# Patient Record
Sex: FEMALE | Race: WHITE | Employment: OTHER | URBAN - METROPOLITAN AREA
[De-identification: names, ages, dates, MRNs, and addresses within clinical notes are randomized per-mention and may not be internally consistent; named-entity substitution may affect disease eponyms.]

---

## 2018-03-28 ENCOUNTER — OFFICE VISIT (OUTPATIENT)
Dept: FAMILY MEDICINE CLINIC | Age: 68
End: 2018-03-28
Payer: MEDICARE

## 2018-03-28 VITALS
BODY MASS INDEX: 44.56 KG/M2 | HEIGHT: 64 IN | TEMPERATURE: 98.2 F | HEART RATE: 73 BPM | WEIGHT: 261 LBS | DIASTOLIC BLOOD PRESSURE: 88 MMHG | OXYGEN SATURATION: 95 % | SYSTOLIC BLOOD PRESSURE: 132 MMHG

## 2018-03-28 DIAGNOSIS — G47.33 OSA ON CPAP: ICD-10-CM

## 2018-03-28 DIAGNOSIS — Z99.89 OSA ON CPAP: ICD-10-CM

## 2018-03-28 DIAGNOSIS — E55.9 VITAMIN D DEFICIENCY: ICD-10-CM

## 2018-03-28 DIAGNOSIS — M25.562 CHRONIC PAIN OF BOTH KNEES: ICD-10-CM

## 2018-03-28 DIAGNOSIS — M25.561 CHRONIC PAIN OF BOTH KNEES: ICD-10-CM

## 2018-03-28 DIAGNOSIS — G89.29 CHRONIC PAIN OF BOTH KNEES: ICD-10-CM

## 2018-03-28 DIAGNOSIS — E03.9 HYPOTHYROIDISM, ADULT: Primary | ICD-10-CM

## 2018-03-28 DIAGNOSIS — Z12.31 SCREENING MAMMOGRAM, ENCOUNTER FOR: ICD-10-CM

## 2018-03-28 PROCEDURE — 99203 OFFICE O/P NEW LOW 30 MIN: CPT | Performed by: FAMILY MEDICINE

## 2018-03-28 RX ORDER — MAG HYDROX/ALUMINUM HYD/SIMETH 400-400-40
SUSPENSION, ORAL (FINAL DOSE FORM) ORAL
COMMUNITY
End: 2021-09-16

## 2018-03-28 RX ORDER — LEVOTHYROXINE SODIUM 0.15 MG/1
150 TABLET ORAL DAILY
COMMUNITY
End: 2018-11-29 | Stop reason: DRUGHIGH

## 2018-03-28 NOTE — PROGRESS NOTES
JESSICA  Uses 6-7 hours and does receive benefit from use of the equipment. Present pressure is 10. Recommended Vit B12 replacement - her vit B12 was 252-low normal on labs drawn by previous PCP    Discussed possibly getting a new sleep study in the future as she is not sure the current pressure for CPAP is as effective. She will call if she wishes referral .     Pt brings most recent labs from her PCP-will have the lab results scanned   Return 11/2018 for fasting labs -will obtain CMP, lipids, TSH and Vit D with next labs   Please note that >30 minutes was spent face-to-face with patient gathering history, performing physical exam, discussing findings, counseling patient, and determining plan forward. All questions addressed and answered. Collins FISHER.

## 2018-03-28 NOTE — LETTER
[address] [place] [address] Drive  [address] [place] [address]  Phone: [phone]  Fax: Arminda Pryor [address] [place] [address], DO        March 28, 2018      To whom it may concern,     Alberto Evangelista has a history of JESSICA with CPAP use. She is using the equipment every night for her JESSICA. She uses the equipment 6-7 hours a night and receives benefit from use.        Sincerely,        Collins Medrano DO

## 2018-04-10 ENCOUNTER — HOSPITAL ENCOUNTER (OUTPATIENT)
Dept: MAMMOGRAPHY | Age: 68
Discharge: HOME | End: 2018-04-12
Payer: MEDICARE

## 2018-04-10 DIAGNOSIS — Z12.31 SCREENING MAMMOGRAM, ENCOUNTER FOR: ICD-10-CM

## 2018-04-10 PROCEDURE — 77067 SCR MAMMO BI INCL CAD: CPT

## 2018-11-19 ENCOUNTER — TELEPHONE (OUTPATIENT)
Dept: FAMILY MEDICINE CLINIC | Age: 68
End: 2018-11-19

## 2018-11-19 DIAGNOSIS — E55.9 VITAMIN D DEFICIENCY: Primary | ICD-10-CM

## 2018-11-19 DIAGNOSIS — Z13.220 SCREENING FOR LIPID DISORDERS: ICD-10-CM

## 2018-11-19 DIAGNOSIS — E03.9 HYPOTHYROIDISM, ADULT: ICD-10-CM

## 2018-11-19 DIAGNOSIS — Z13.1 DIABETES MELLITUS SCREENING: ICD-10-CM

## 2018-11-20 ENCOUNTER — HOSPITAL ENCOUNTER (OUTPATIENT)
Age: 68
Discharge: HOME | End: 2018-11-22
Payer: MEDICARE

## 2018-11-20 ENCOUNTER — NURSE ONLY (OUTPATIENT)
Dept: FAMILY MEDICINE CLINIC | Age: 68
End: 2018-11-20
Payer: MEDICARE

## 2018-11-20 DIAGNOSIS — Z13.220 SCREENING FOR LIPID DISORDERS: ICD-10-CM

## 2018-11-20 DIAGNOSIS — E03.9 HYPOTHYROIDISM, ADULT: ICD-10-CM

## 2018-11-20 DIAGNOSIS — E55.9 VITAMIN D DEFICIENCY: ICD-10-CM

## 2018-11-20 DIAGNOSIS — E55.9 VITAMIN D DEFICIENCY: Primary | ICD-10-CM

## 2018-11-20 LAB
ALBUMIN SERPL-MCNC: 4.5 G/DL (ref 3.5–5.2)
ALP BLD-CCNC: 73 U/L (ref 35–104)
ALT SERPL-CCNC: 22 U/L (ref 0–32)
ANION GAP SERPL CALCULATED.3IONS-SCNC: 14 MMOL/L (ref 7–16)
AST SERPL-CCNC: 21 U/L (ref 0–31)
BILIRUB SERPL-MCNC: 0.5 MG/DL (ref 0–1.2)
BUN BLDV-MCNC: 18 MG/DL (ref 8–23)
CALCIUM SERPL-MCNC: 9.1 MG/DL (ref 8.6–10.2)
CHLORIDE BLD-SCNC: 98 MMOL/L (ref 98–107)
CHOLESTEROL, TOTAL: 224 MG/DL (ref 0–199)
CO2: 24 MMOL/L (ref 22–29)
CREAT SERPL-MCNC: 0.9 MG/DL (ref 0.5–1)
GFR AFRICAN AMERICAN: >60
GFR NON-AFRICAN AMERICAN: >60 ML/MIN/1.73
GLUCOSE BLD-MCNC: 87 MG/DL (ref 74–99)
HDLC SERPL-MCNC: 48 MG/DL
LDL CHOLESTEROL CALCULATED: 138 MG/DL (ref 0–99)
POTASSIUM SERPL-SCNC: 4.6 MMOL/L (ref 3.5–5)
SODIUM BLD-SCNC: 136 MMOL/L (ref 132–146)
TOTAL PROTEIN: 7.8 G/DL (ref 6.4–8.3)
TRIGL SERPL-MCNC: 189 MG/DL (ref 0–149)
TSH SERPL DL<=0.05 MIU/L-ACNC: 9.67 UIU/ML (ref 0.27–4.2)
VITAMIN D 25-HYDROXY: 27 NG/ML (ref 30–100)
VLDLC SERPL CALC-MCNC: 38 MG/DL

## 2018-11-20 PROCEDURE — 36415 COLL VENOUS BLD VENIPUNCTURE: CPT | Performed by: FAMILY MEDICINE

## 2018-11-20 PROCEDURE — 82306 VITAMIN D 25 HYDROXY: CPT

## 2018-11-20 PROCEDURE — 80053 COMPREHEN METABOLIC PANEL: CPT

## 2018-11-20 PROCEDURE — 80061 LIPID PANEL: CPT

## 2018-11-20 PROCEDURE — 84443 ASSAY THYROID STIM HORMONE: CPT

## 2018-11-29 ENCOUNTER — OFFICE VISIT (OUTPATIENT)
Dept: FAMILY MEDICINE CLINIC | Age: 68
End: 2018-11-29
Payer: MEDICARE

## 2018-11-29 VITALS
HEART RATE: 67 BPM | HEIGHT: 64 IN | WEIGHT: 268 LBS | BODY MASS INDEX: 45.75 KG/M2 | DIASTOLIC BLOOD PRESSURE: 78 MMHG | SYSTOLIC BLOOD PRESSURE: 120 MMHG | TEMPERATURE: 98.5 F | OXYGEN SATURATION: 93 %

## 2018-11-29 DIAGNOSIS — Z99.89 OSA ON CPAP: ICD-10-CM

## 2018-11-29 DIAGNOSIS — G47.33 OSA ON CPAP: ICD-10-CM

## 2018-11-29 DIAGNOSIS — E55.9 VITAMIN D DEFICIENCY: ICD-10-CM

## 2018-11-29 DIAGNOSIS — R42 DIZZINESS: ICD-10-CM

## 2018-11-29 DIAGNOSIS — E78.2 MIXED HYPERLIPIDEMIA: ICD-10-CM

## 2018-11-29 DIAGNOSIS — E03.9 HYPOTHYROIDISM, ADULT: Primary | ICD-10-CM

## 2018-11-29 DIAGNOSIS — R06.02 SHORTNESS OF BREATH ON EXERTION: ICD-10-CM

## 2018-11-29 DIAGNOSIS — R03.0 ELEVATED BLOOD-PRESSURE READING WITHOUT DIAGNOSIS OF HYPERTENSION: ICD-10-CM

## 2018-11-29 DIAGNOSIS — Z23 NEED FOR INFLUENZA VACCINATION: ICD-10-CM

## 2018-11-29 PROCEDURE — G0008 ADMIN INFLUENZA VIRUS VAC: HCPCS | Performed by: FAMILY MEDICINE

## 2018-11-29 PROCEDURE — 93000 ELECTROCARDIOGRAM COMPLETE: CPT | Performed by: FAMILY MEDICINE

## 2018-11-29 PROCEDURE — 90662 IIV NO PRSV INCREASED AG IM: CPT | Performed by: FAMILY MEDICINE

## 2018-11-29 PROCEDURE — 99214 OFFICE O/P EST MOD 30 MIN: CPT | Performed by: FAMILY MEDICINE

## 2018-11-29 RX ORDER — LEVOTHYROXINE SODIUM 175 UG/1
175 TABLET ORAL DAILY
Qty: 90 TABLET | Refills: 2 | Status: SHIPPED | OUTPATIENT
Start: 2018-11-29 | End: 2019-01-31 | Stop reason: DRUGHIGH

## 2018-11-29 ASSESSMENT — PATIENT HEALTH QUESTIONNAIRE - PHQ9
SUM OF ALL RESPONSES TO PHQ QUESTIONS 1-9: 0
SUM OF ALL RESPONSES TO PHQ9 QUESTIONS 1 & 2: 0
SUM OF ALL RESPONSES TO PHQ QUESTIONS 1-9: 0
1. LITTLE INTEREST OR PLEASURE IN DOING THINGS: 0
2. FEELING DOWN, DEPRESSED OR HOPELESS: 0

## 2018-12-07 ENCOUNTER — HOSPITAL ENCOUNTER (OUTPATIENT)
Dept: ULTRASOUND IMAGING | Age: 68
Discharge: HOME | End: 2018-12-09
Payer: MEDICARE

## 2018-12-07 DIAGNOSIS — R42 DIZZINESS: ICD-10-CM

## 2018-12-07 PROCEDURE — 93880 EXTRACRANIAL BILAT STUDY: CPT

## 2018-12-09 DIAGNOSIS — E03.9 HYPOTHYROIDISM, ADULT: Primary | ICD-10-CM

## 2018-12-13 ENCOUNTER — HOSPITAL ENCOUNTER (OUTPATIENT)
Dept: CARDIOLOGY | Age: 68
Discharge: HOME | End: 2018-12-13
Payer: MEDICARE

## 2018-12-13 ENCOUNTER — HOSPITAL ENCOUNTER (OUTPATIENT)
Dept: GENERAL RADIOLOGY | Age: 68
Discharge: HOME | End: 2018-12-15
Payer: MEDICARE

## 2018-12-13 ENCOUNTER — HOSPITAL ENCOUNTER (OUTPATIENT)
Age: 68
Discharge: HOME | End: 2018-12-15
Payer: MEDICARE

## 2018-12-13 DIAGNOSIS — R06.02 SHORTNESS OF BREATH ON EXERTION: ICD-10-CM

## 2018-12-13 LAB
LV EF: 65 %
LVEF MODALITY: NORMAL

## 2018-12-13 PROCEDURE — 93306 TTE W/DOPPLER COMPLETE: CPT | Performed by: PSYCHIATRY & NEUROLOGY

## 2018-12-13 PROCEDURE — 71046 X-RAY EXAM CHEST 2 VIEWS: CPT

## 2018-12-14 DIAGNOSIS — R06.09 DYSPNEA ON EXERTION: Primary | ICD-10-CM

## 2018-12-15 ENCOUNTER — HOSPITAL ENCOUNTER (OUTPATIENT)
Age: 68
Discharge: HOME | End: 2018-12-15
Payer: MEDICARE

## 2018-12-15 DIAGNOSIS — R06.02 SHORTNESS OF BREATH ON EXERTION: Primary | ICD-10-CM

## 2018-12-15 DIAGNOSIS — R79.89 ELEVATED BRAIN NATRIURETIC PEPTIDE (BNP) LEVEL: ICD-10-CM

## 2018-12-15 DIAGNOSIS — E78.2 MIXED HYPERLIPIDEMIA: ICD-10-CM

## 2018-12-15 DIAGNOSIS — R06.09 DYSPNEA ON EXERTION: ICD-10-CM

## 2018-12-15 LAB — PRO-BNP: 743 PG/ML (ref 0–125)

## 2018-12-15 PROCEDURE — 83880 ASSAY OF NATRIURETIC PEPTIDE: CPT

## 2018-12-15 PROCEDURE — 36415 COLL VENOUS BLD VENIPUNCTURE: CPT

## 2019-01-02 ENCOUNTER — HOSPITAL ENCOUNTER (OUTPATIENT)
Age: 69
Discharge: HOME | End: 2019-01-04
Payer: MEDICARE

## 2019-01-02 ENCOUNTER — OFFICE VISIT (OUTPATIENT)
Dept: FAMILY MEDICINE CLINIC | Age: 69
End: 2019-01-02
Payer: MEDICARE

## 2019-01-02 VITALS
DIASTOLIC BLOOD PRESSURE: 72 MMHG | OXYGEN SATURATION: 98 % | WEIGHT: 262 LBS | HEART RATE: 81 BPM | BODY MASS INDEX: 44.73 KG/M2 | TEMPERATURE: 98.2 F | HEIGHT: 64 IN | SYSTOLIC BLOOD PRESSURE: 120 MMHG

## 2019-01-02 DIAGNOSIS — E03.9 HYPOTHYROIDISM, ADULT: ICD-10-CM

## 2019-01-02 DIAGNOSIS — Z23 NEED FOR VACCINATION WITH 13-POLYVALENT PNEUMOCOCCAL CONJUGATE VACCINE: ICD-10-CM

## 2019-01-02 DIAGNOSIS — R06.02 SHORTNESS OF BREATH ON EXERTION: Primary | ICD-10-CM

## 2019-01-02 DIAGNOSIS — R06.02 SHORTNESS OF BREATH ON EXERTION: ICD-10-CM

## 2019-01-02 LAB
BASOPHILS ABSOLUTE: 0.04 E9/L (ref 0–0.2)
BASOPHILS RELATIVE PERCENT: 0.6 % (ref 0–2)
EOSINOPHILS ABSOLUTE: 0.25 E9/L (ref 0.05–0.5)
EOSINOPHILS RELATIVE PERCENT: 3.5 % (ref 0–6)
HCT VFR BLD CALC: 45.1 % (ref 34–48)
HEMOGLOBIN: 13.7 G/DL (ref 11.5–15.5)
IMMATURE GRANULOCYTES #: 0.02 E9/L
IMMATURE GRANULOCYTES %: 0.3 % (ref 0–5)
LYMPHOCYTES ABSOLUTE: 1.65 E9/L (ref 1.5–4)
LYMPHOCYTES RELATIVE PERCENT: 23.4 % (ref 20–42)
MCH RBC QN AUTO: 28 PG (ref 26–35)
MCHC RBC AUTO-ENTMCNC: 30.4 % (ref 32–34.5)
MCV RBC AUTO: 92 FL (ref 80–99.9)
MONOCYTES ABSOLUTE: 0.7 E9/L (ref 0.1–0.95)
MONOCYTES RELATIVE PERCENT: 9.9 % (ref 2–12)
NEUTROPHILS ABSOLUTE: 4.39 E9/L (ref 1.8–7.3)
NEUTROPHILS RELATIVE PERCENT: 62.3 % (ref 43–80)
PDW BLD-RTO: 13.5 FL (ref 11.5–15)
PLATELET # BLD: 241 E9/L (ref 130–450)
PMV BLD AUTO: 11.9 FL (ref 7–12)
RBC # BLD: 4.9 E12/L (ref 3.5–5.5)
WBC # BLD: 7.1 E9/L (ref 4.5–11.5)

## 2019-01-02 PROCEDURE — 36415 COLL VENOUS BLD VENIPUNCTURE: CPT | Performed by: FAMILY MEDICINE

## 2019-01-02 PROCEDURE — G0009 ADMIN PNEUMOCOCCAL VACCINE: HCPCS | Performed by: FAMILY MEDICINE

## 2019-01-02 PROCEDURE — 99213 OFFICE O/P EST LOW 20 MIN: CPT | Performed by: FAMILY MEDICINE

## 2019-01-02 PROCEDURE — 90670 PCV13 VACCINE IM: CPT | Performed by: FAMILY MEDICINE

## 2019-01-02 PROCEDURE — 85025 COMPLETE CBC W/AUTO DIFF WBC: CPT

## 2019-01-03 ENCOUNTER — HOSPITAL ENCOUNTER (OUTPATIENT)
Dept: CARDIOLOGY | Age: 69
Discharge: HOME | End: 2019-01-03
Payer: MEDICARE

## 2019-01-03 VITALS
HEART RATE: 98 BPM | OXYGEN SATURATION: 97 % | HEIGHT: 64 IN | BODY MASS INDEX: 44.73 KG/M2 | WEIGHT: 262 LBS | DIASTOLIC BLOOD PRESSURE: 72 MMHG | SYSTOLIC BLOOD PRESSURE: 128 MMHG

## 2019-01-03 DIAGNOSIS — E78.2 MIXED HYPERLIPIDEMIA: ICD-10-CM

## 2019-01-03 DIAGNOSIS — R79.89 ELEVATED BRAIN NATRIURETIC PEPTIDE (BNP) LEVEL: ICD-10-CM

## 2019-01-03 DIAGNOSIS — R06.02 SHORTNESS OF BREATH ON EXERTION: Primary | ICD-10-CM

## 2019-01-03 LAB
LV EF: 75 %
LVEF MODALITY: NORMAL

## 2019-01-03 PROCEDURE — 2580000003 HC RX 258: Performed by: INTERNAL MEDICINE

## 2019-01-03 PROCEDURE — 3430000000 HC RX DIAGNOSTIC RADIOPHARMACEUTICAL: Performed by: INTERNAL MEDICINE

## 2019-01-03 PROCEDURE — 78452 HT MUSCLE IMAGE SPECT MULT: CPT

## 2019-01-03 PROCEDURE — A9500 TC99M SESTAMIBI: HCPCS | Performed by: INTERNAL MEDICINE

## 2019-01-03 PROCEDURE — 93017 CV STRESS TEST TRACING ONLY: CPT

## 2019-01-03 RX ORDER — SODIUM CHLORIDE 0.9 % (FLUSH) 0.9 %
10 SYRINGE (ML) INJECTION PRN
Status: DISCONTINUED | OUTPATIENT
Start: 2019-01-03 | End: 2019-01-04 | Stop reason: HOSPADM

## 2019-01-03 RX ADMIN — Medication 10 ML: at 10:52

## 2019-01-03 RX ADMIN — Medication 10 ML: at 08:56

## 2019-01-03 RX ADMIN — Medication 31.9 MILLICURIE: at 10:52

## 2019-01-03 RX ADMIN — Medication 10.5 MILLICURIE: at 08:56

## 2019-01-04 DIAGNOSIS — R06.09 DYSPNEA ON EXERTION: Primary | ICD-10-CM

## 2019-01-07 ENCOUNTER — HOSPITAL ENCOUNTER (OUTPATIENT)
Dept: PULMONOLOGY | Age: 69
Discharge: HOME | End: 2019-01-07
Payer: MEDICARE

## 2019-01-07 DIAGNOSIS — R06.09 DYSPNEA ON EXERTION: ICD-10-CM

## 2019-01-07 PROCEDURE — 94726 PLETHYSMOGRAPHY LUNG VOLUMES: CPT

## 2019-01-07 PROCEDURE — 94060 EVALUATION OF WHEEZING: CPT

## 2019-01-07 PROCEDURE — 94729 DIFFUSING CAPACITY: CPT

## 2019-01-08 ENCOUNTER — TELEPHONE (OUTPATIENT)
Dept: ADMINISTRATIVE | Age: 69
End: 2019-01-08

## 2019-01-09 ENCOUNTER — TELEPHONE (OUTPATIENT)
Dept: ADMINISTRATIVE | Age: 69
End: 2019-01-09

## 2019-01-26 PROBLEM — R06.02 SOB (SHORTNESS OF BREATH): Status: ACTIVE | Noted: 2019-01-26

## 2019-01-29 ENCOUNTER — OFFICE VISIT (OUTPATIENT)
Dept: CARDIOLOGY CLINIC | Age: 69
End: 2019-01-29
Payer: MEDICARE

## 2019-01-29 VITALS
HEART RATE: 128 BPM | RESPIRATION RATE: 16 BRPM | WEIGHT: 257 LBS | DIASTOLIC BLOOD PRESSURE: 90 MMHG | HEIGHT: 64 IN | SYSTOLIC BLOOD PRESSURE: 138 MMHG | BODY MASS INDEX: 43.87 KG/M2

## 2019-01-29 DIAGNOSIS — R06.02 SOB (SHORTNESS OF BREATH): ICD-10-CM

## 2019-01-29 DIAGNOSIS — I48.19 PERSISTENT ATRIAL FIBRILLATION (HCC): ICD-10-CM

## 2019-01-29 PROCEDURE — 93000 ELECTROCARDIOGRAM COMPLETE: CPT | Performed by: INTERNAL MEDICINE

## 2019-01-29 PROCEDURE — 99205 OFFICE O/P NEW HI 60 MIN: CPT | Performed by: INTERNAL MEDICINE

## 2019-01-29 RX ORDER — METOPROLOL SUCCINATE 50 MG/1
50 TABLET, EXTENDED RELEASE ORAL DAILY
Qty: 90 TABLET | Refills: 3 | Status: SHIPPED | OUTPATIENT
Start: 2019-01-29 | End: 2019-01-30 | Stop reason: SDUPTHER

## 2019-01-30 ENCOUNTER — HOSPITAL ENCOUNTER (OUTPATIENT)
Age: 69
Discharge: HOME | End: 2019-02-01
Payer: MEDICARE

## 2019-01-30 ENCOUNTER — NURSE ONLY (OUTPATIENT)
Dept: FAMILY MEDICINE CLINIC | Age: 69
End: 2019-01-30
Payer: MEDICARE

## 2019-01-30 DIAGNOSIS — E03.9 HYPOTHYROIDISM, ADULT: Primary | ICD-10-CM

## 2019-01-30 DIAGNOSIS — E03.9 HYPOTHYROIDISM, ADULT: ICD-10-CM

## 2019-01-30 LAB
T4 FREE: 2.21 NG/DL (ref 0.93–1.7)
TSH SERPL DL<=0.05 MIU/L-ACNC: <0.005 UIU/ML (ref 0.27–4.2)

## 2019-01-30 PROCEDURE — 84443 ASSAY THYROID STIM HORMONE: CPT

## 2019-01-30 PROCEDURE — 36415 COLL VENOUS BLD VENIPUNCTURE: CPT | Performed by: FAMILY MEDICINE

## 2019-01-30 PROCEDURE — 84439 ASSAY OF FREE THYROXINE: CPT

## 2019-01-30 RX ORDER — METOPROLOL SUCCINATE 50 MG/1
50 TABLET, EXTENDED RELEASE ORAL DAILY
Qty: 30 TABLET | Refills: 0 | Status: SHIPPED | OUTPATIENT
Start: 2019-01-30 | End: 2019-12-18 | Stop reason: SDUPTHER

## 2019-01-31 ENCOUNTER — TELEPHONE (OUTPATIENT)
Dept: FAMILY MEDICINE CLINIC | Age: 69
End: 2019-01-31

## 2019-01-31 DIAGNOSIS — E03.9 HYPOTHYROIDISM, ADULT: Primary | ICD-10-CM

## 2019-01-31 RX ORDER — LEVOTHYROXINE SODIUM 0.15 MG/1
150 TABLET ORAL DAILY
Qty: 90 TABLET | Refills: 0 | Status: SHIPPED | OUTPATIENT
Start: 2019-01-31 | End: 2019-03-26

## 2019-02-13 ENCOUNTER — OFFICE VISIT (OUTPATIENT)
Dept: FAMILY MEDICINE CLINIC | Age: 69
End: 2019-02-13
Payer: MEDICARE

## 2019-02-13 ENCOUNTER — OFFICE VISIT (OUTPATIENT)
Dept: CARDIOLOGY CLINIC | Age: 69
End: 2019-02-13
Payer: MEDICARE

## 2019-02-13 VITALS
RESPIRATION RATE: 16 BRPM | DIASTOLIC BLOOD PRESSURE: 70 MMHG | HEART RATE: 63 BPM | BODY MASS INDEX: 44.71 KG/M2 | WEIGHT: 261.9 LBS | SYSTOLIC BLOOD PRESSURE: 128 MMHG | HEIGHT: 64 IN

## 2019-02-13 VITALS
OXYGEN SATURATION: 98 % | DIASTOLIC BLOOD PRESSURE: 72 MMHG | WEIGHT: 261 LBS | HEART RATE: 65 BPM | RESPIRATION RATE: 18 BRPM | HEIGHT: 64 IN | SYSTOLIC BLOOD PRESSURE: 116 MMHG | BODY MASS INDEX: 44.56 KG/M2

## 2019-02-13 DIAGNOSIS — E78.2 MIXED HYPERLIPIDEMIA: ICD-10-CM

## 2019-02-13 DIAGNOSIS — Z76.89 ENCOUNTER TO ESTABLISH CARE WITH NEW DOCTOR: ICD-10-CM

## 2019-02-13 DIAGNOSIS — I48.19 PERSISTENT ATRIAL FIBRILLATION (HCC): Primary | ICD-10-CM

## 2019-02-13 DIAGNOSIS — I48.0 PAROXYSMAL ATRIAL FIBRILLATION (HCC): ICD-10-CM

## 2019-02-13 DIAGNOSIS — E03.9 HYPOTHYROIDISM, ADULT: Primary | ICD-10-CM

## 2019-02-13 PROCEDURE — 3288F FALL RISK ASSESSMENT DOCD: CPT | Performed by: FAMILY MEDICINE

## 2019-02-13 PROCEDURE — G8510 SCR DEP NEG, NO PLAN REQD: HCPCS | Performed by: FAMILY MEDICINE

## 2019-02-13 PROCEDURE — 93000 ELECTROCARDIOGRAM COMPLETE: CPT | Performed by: INTERNAL MEDICINE

## 2019-02-13 PROCEDURE — 99213 OFFICE O/P EST LOW 20 MIN: CPT | Performed by: INTERNAL MEDICINE

## 2019-02-13 PROCEDURE — 99213 OFFICE O/P EST LOW 20 MIN: CPT | Performed by: FAMILY MEDICINE

## 2019-02-13 ASSESSMENT — ENCOUNTER SYMPTOMS
VOMITING: 0
NAUSEA: 0
BLOOD IN STOOL: 0
COUGH: 0
BACK PAIN: 0
ABDOMINAL PAIN: 0
TROUBLE SWALLOWING: 0
SORE THROAT: 0
RHINORRHEA: 0
PHOTOPHOBIA: 0
COLOR CHANGE: 0
SINUS PRESSURE: 0
WHEEZING: 0
EYE PAIN: 0
CONSTIPATION: 0
DIARRHEA: 0
SHORTNESS OF BREATH: 0
SINUS PAIN: 0

## 2019-02-13 ASSESSMENT — PATIENT HEALTH QUESTIONNAIRE - PHQ9
1. LITTLE INTEREST OR PLEASURE IN DOING THINGS: 0
SUM OF ALL RESPONSES TO PHQ9 QUESTIONS 1 & 2: 0
2. FEELING DOWN, DEPRESSED OR HOPELESS: 0
SUM OF ALL RESPONSES TO PHQ QUESTIONS 1-9: 0
SUM OF ALL RESPONSES TO PHQ QUESTIONS 1-9: 0

## 2019-03-22 ENCOUNTER — HOSPITAL ENCOUNTER (OUTPATIENT)
Age: 69
Discharge: HOME | End: 2019-03-22
Payer: MEDICARE

## 2019-03-22 DIAGNOSIS — E03.9 HYPOTHYROIDISM, ADULT: ICD-10-CM

## 2019-03-22 LAB — TSH SERPL DL<=0.05 MIU/L-ACNC: 0.01 UIU/ML (ref 0.27–4.2)

## 2019-03-22 PROCEDURE — 84443 ASSAY THYROID STIM HORMONE: CPT

## 2019-03-22 PROCEDURE — 36415 COLL VENOUS BLD VENIPUNCTURE: CPT

## 2019-03-26 ENCOUNTER — OFFICE VISIT (OUTPATIENT)
Dept: FAMILY MEDICINE CLINIC | Age: 69
End: 2019-03-26
Payer: MEDICARE

## 2019-03-26 VITALS
HEART RATE: 67 BPM | SYSTOLIC BLOOD PRESSURE: 133 MMHG | RESPIRATION RATE: 18 BRPM | WEIGHT: 264 LBS | OXYGEN SATURATION: 96 % | BODY MASS INDEX: 45.07 KG/M2 | HEIGHT: 64 IN | DIASTOLIC BLOOD PRESSURE: 70 MMHG | TEMPERATURE: 98.9 F

## 2019-03-26 DIAGNOSIS — G47.33 OSA (OBSTRUCTIVE SLEEP APNEA): ICD-10-CM

## 2019-03-26 DIAGNOSIS — E03.9 ACQUIRED HYPOTHYROIDISM: Primary | ICD-10-CM

## 2019-03-26 DIAGNOSIS — Z23 NEED FOR TETANUS BOOSTER: ICD-10-CM

## 2019-03-26 PROCEDURE — 90471 IMMUNIZATION ADMIN: CPT | Performed by: FAMILY MEDICINE

## 2019-03-26 PROCEDURE — 99213 OFFICE O/P EST LOW 20 MIN: CPT | Performed by: FAMILY MEDICINE

## 2019-03-26 PROCEDURE — 90715 TDAP VACCINE 7 YRS/> IM: CPT | Performed by: FAMILY MEDICINE

## 2019-03-26 RX ORDER — LEVOTHYROXINE SODIUM 0.1 MG/1
100 TABLET ORAL DAILY
Qty: 30 TABLET | Refills: 3 | Status: SHIPPED | OUTPATIENT
Start: 2019-03-26 | End: 2019-07-31 | Stop reason: SDUPTHER

## 2019-03-26 ASSESSMENT — ENCOUNTER SYMPTOMS
VOMITING: 0
WHEEZING: 0
NAUSEA: 0
CONSTIPATION: 0
SHORTNESS OF BREATH: 0
DIARRHEA: 0
BLOOD IN STOOL: 0
COUGH: 0
ABDOMINAL PAIN: 0

## 2019-04-30 ENCOUNTER — HOSPITAL ENCOUNTER (OUTPATIENT)
Dept: SLEEP CENTER | Age: 69
Discharge: HOME | End: 2019-04-30
Payer: MEDICARE

## 2019-04-30 DIAGNOSIS — G47.33 OSA (OBSTRUCTIVE SLEEP APNEA): ICD-10-CM

## 2019-04-30 PROCEDURE — 95811 POLYSOM 6/>YRS CPAP 4/> PARM: CPT

## 2019-05-01 VITALS
OXYGEN SATURATION: 94 % | DIASTOLIC BLOOD PRESSURE: 86 MMHG | WEIGHT: 255 LBS | HEART RATE: 74 BPM | BODY MASS INDEX: 43.54 KG/M2 | HEIGHT: 64 IN | SYSTOLIC BLOOD PRESSURE: 132 MMHG

## 2019-05-03 ENCOUNTER — TELEPHONE (OUTPATIENT)
Dept: FAMILY MEDICINE CLINIC | Age: 69
End: 2019-05-03

## 2019-05-03 NOTE — TELEPHONE ENCOUNTER
Please call Ignacio Díaz regarding the results of her sleep study. The study showed that patient does not have sleep apnea, as her apnea/hypopnea index was less than 5 (normal). However, the sleep study did reveal nocturnal hypoxia, which may require further evaluation. We can discuss this more in depth at patient's next appointment with me on 5/21/19.

## 2019-05-03 NOTE — PROGRESS NOTES
[address] [address]                               SLEEP STUDY REPORT    PATIENT NAME: Trina Munoz                          :        1950  MED REC NO:   [id number]                            ROOM:  ACCOUNT NO:   [de-identified]                           ADMIT DATE: 2019  PROVIDER:     [place] Jeff MD    DATE OF STUDY:  2019    REFERRING PROVIDER:  Phyllis Kumar DO    STUDY PERFORMED:  Polysomnography. INDICATION FOR POLYSOMNOGRAPHY:  Excessive napping, driving drowsy, and  waking with heart pounding. CURRENT MEDICATIONS:  Eliquis, metoprolol, and Synthroid. INTERPRETATION:  SLEEP ARCHITECTURE:  This patient had a total time in bed of 473  minutes. Total sleep time was 375 minutes. Sleep efficiency was 79%. Sleep latency was 13 minutes. REM latency was 149 minutes. SLEEP STAGING:  The patient was awake 21% of the time in bed. Stage N1  was 8% and N2 was 80% of the total sleep time. Slow wave sleep was 1%  of the sleep time and stage REM sleep was 11% of the sleep time. RESPIRATION SUMMARY:  APNEA:  There were four apneic events including two central and two  obstructive. All occurred during non-REM stage sleep. Maximum duration  was 33 seconds. HYPOPNEA:  There were 24 hypopneic events, three occurred during REM  stage sleep. Maximum duration was 56 seconds. APNEA/HYPOPNEA INDEX:  The apnea/hypopnea index is 4.5. Five of the 28  events occurred in the supine position. AROUSAL ANALYSIS:  There were 74 arousals/awakenings, the sleep  disruption index was 12 (normal less than 10). LIMB MOVEMENT SUMMARY:  There were 108 limb movements, the limb movement  index was 17 (normal less than 15). OXYGEN SATURATION:  Average oxygen saturation while awake was 93%. Lowest saturation was 77%. The patient spent 79% of the time with  saturation at or greater than 90%.   Twenty one percent of the time,  saturation ranged from 80% to 89% and less than 1% of the time,  saturation was less than 80%. HEART RATE SUMMARY:  Average heart rate while awake was 65 beats per  minute. Maximum heart rate during the sleep was 75 beats per minute and  minimum heart rate during the sleep was 49 beats per minute. There were  rare PACs and PVCs, not associated with respiratory events. MISCELLANEOUS:  Artesian Sleepiness Scale score is 13/24. There was no  snoring or bruxism noted. IMPRESSION:  1. No evidence of sleep apnea. 2.  Nocturnal hypoxia. 3.  No snoring. 4.  Rare PVCs/PACs. DISCUSSION:  This patient has an apnea/hypopnea index of 4.5. Normal is  less than five, leaving this patient in the normal category. Along with  this, there was no snoring and only rare PACs/PVCs. Therefore, based on  the results of this study, treatment for sleep apnea is not indicated. However,   One does note nocturnal hypoxia. The etiology of this is not clear from the  study and this may require further investigation. SUGGESTIONS:  1.  Dr. Tucker Cap to discuss the results of study with the  patient. 2.  No reason to treat for sleep apnea. 3.  Consider further evaluation of nocturnal hypoxia.         Jessie Ortiz MD  Diplomat of Sleep Medicine    D: 05/02/2019 15:28:39       T: 05/02/2019 15:36:36     [id number]  Job#: [id number]     Doc#: [id number]    CC:

## 2019-05-17 ENCOUNTER — HOSPITAL ENCOUNTER (OUTPATIENT)
Age: 69
Discharge: HOME | End: 2019-05-17
Payer: MEDICARE

## 2019-05-17 DIAGNOSIS — E03.9 ACQUIRED HYPOTHYROIDISM: ICD-10-CM

## 2019-05-17 LAB
T4 FREE: 1.22 NG/DL (ref 0.93–1.7)
TSH SERPL DL<=0.05 MIU/L-ACNC: 1.29 UIU/ML (ref 0.27–4.2)

## 2019-05-17 PROCEDURE — 84439 ASSAY OF FREE THYROXINE: CPT

## 2019-05-17 PROCEDURE — 84443 ASSAY THYROID STIM HORMONE: CPT

## 2019-05-17 PROCEDURE — 36415 COLL VENOUS BLD VENIPUNCTURE: CPT

## 2019-05-21 ENCOUNTER — OFFICE VISIT (OUTPATIENT)
Dept: FAMILY MEDICINE CLINIC | Age: 69
End: 2019-05-21
Payer: MEDICARE

## 2019-05-21 VITALS
BODY MASS INDEX: 45.89 KG/M2 | TEMPERATURE: 98.1 F | DIASTOLIC BLOOD PRESSURE: 65 MMHG | SYSTOLIC BLOOD PRESSURE: 96 MMHG | WEIGHT: 268.8 LBS | HEIGHT: 64 IN | OXYGEN SATURATION: 98 % | RESPIRATION RATE: 16 BRPM | HEART RATE: 64 BPM

## 2019-05-21 DIAGNOSIS — Z12.31 BREAST CANCER SCREENING BY MAMMOGRAM: ICD-10-CM

## 2019-05-21 DIAGNOSIS — G47.33 OSA (OBSTRUCTIVE SLEEP APNEA): ICD-10-CM

## 2019-05-21 DIAGNOSIS — E55.9 VITAMIN D DEFICIENCY: ICD-10-CM

## 2019-05-21 DIAGNOSIS — E78.5 HYPERLIPIDEMIA, UNSPECIFIED HYPERLIPIDEMIA TYPE: ICD-10-CM

## 2019-05-21 DIAGNOSIS — Z78.0 ASYMPTOMATIC POSTMENOPAUSAL STATE: ICD-10-CM

## 2019-05-21 DIAGNOSIS — Z23 NEED FOR SHINGLES VACCINE: ICD-10-CM

## 2019-05-21 DIAGNOSIS — E03.9 HYPOTHYROIDISM, UNSPECIFIED TYPE: Primary | ICD-10-CM

## 2019-05-21 PROCEDURE — 99213 OFFICE O/P EST LOW 20 MIN: CPT | Performed by: FAMILY MEDICINE

## 2019-05-21 ASSESSMENT — ENCOUNTER SYMPTOMS
CONSTIPATION: 0
NAUSEA: 0
WHEEZING: 0
VOMITING: 0
SHORTNESS OF BREATH: 1
SINUS PAIN: 0
RHINORRHEA: 0
COUGH: 0
ABDOMINAL PAIN: 0
EYE PAIN: 0
BLOOD IN STOOL: 0
DIARRHEA: 0
SINUS PRESSURE: 0

## 2019-05-21 NOTE — PROGRESS NOTES
[facility] [address]  Precepting Note    Subjective:  Hypothyroidism  Labs WNL  tolerating meds  Feels OK- denies SSx  Still sees cardio for Afib     ROS otherwise negative     Past medical, surgical, family and social history were reviewed, non-contributory, and unchanged unless otherwise stated. Objective:    BP 96/65 (Site: Left Upper Arm, Position: Sitting, Cuff Size: Large Adult)   Pulse 64   Temp 98.1 °F (36.7 °C) (Oral)   Resp 16   Ht 5' 4\" (1.626 m)   Wt 268 lb 12.8 oz (121.9 kg)   LMP  (LMP Unknown)   SpO2 98%   Breastfeeding? No   BMI 46.14 kg/m²     Exam is as noted by resident with the following changes, additions or corrections:    General:  NAD; alert & oriented x 3   Neck No thyromegaly   Heart:  RRR  Lungs:  CTA bilaterally, no wheeze, rales or rhonchi    Assessment/Plan:  Hypothyroid   Stable  afib   Per cardio      Attending Physician Statement  I have reviewed the chart, including any radiology or labs. I have discussed the case, including pertinent history and exam findings with the resident. I agree with the assessment, plan and orders as documented by the resident. Please refer to the resident note for additional information.       Electronically signed by Monster Ram MD on 5/21/2019 at 3:34 PM

## 2019-05-21 NOTE — PROGRESS NOTES
[facility]  Family Medicine Residency Program  Phone: [phone]  Fax: [phone]    Patient:  Gertrudis Montes De Oca 79 y.o. female                                 Date of Service: 19                            Chief Complaint:   Chief Complaint   Patient presents with    Results    Hypothyroidism     2 month f/u       History of Present Illness: The patient is a 79 y.o. female who presents to the clinic today for follow-up for hypothyroidism. 1. Hypothyroidism   - Recent labs from 19 showed TSH and free T4 WNL  - On levothyroxine 100 mcg daily. Not missing doses. No side effects.  - Denies weight gain, dry skin, dry hair, and constipation. Review of Systems:   Review of Systems   Constitutional: Negative for activity change, chills, fever and unexpected weight change. HENT: Positive for sneezing. Negative for ear pain, hearing loss, nosebleeds, rhinorrhea, sinus pressure, sinus pain and tinnitus. Eyes: Negative for pain. Denies double vision. Denies blurry vision. Respiratory: Positive for shortness of breath (hx of atrial fibrillation). Negative for cough and wheezing. Cardiovascular: Positive for palpitations (hx of atrial fibrillation). Negative for chest pain and leg swelling. Gastrointestinal: Negative for abdominal pain, blood in stool, constipation, diarrhea, nausea and vomiting. Genitourinary: Positive for urgency. Negative for dysuria, frequency, hematuria and vaginal bleeding. Neurological: Negative for dizziness, tremors, syncope, light-headedness and headaches. Psychiatric/Behavioral: Negative for dysphoric mood. The patient is not nervous/anxious.         Past Medical History:      Diagnosis Date    Arthritis     Atrial fibrillation ([address] 2019    Dr. [facility]    Hypothyroidism     Sleep apnea     On CPAP, wears nightly        Past Surgical History:        Procedure Laterality Date     SECTION      x2    COLONOSCOPY      HYSTERECTOMY, TOTAL ABDOMINAL      WISDOM TOOTH EXTRACTION         Allergies:    Patient has no known allergies.     Social History:   Social History     Socioeconomic History    Marital status:      Spouse name: Not on file    Number of children: Not on file    Years of education: Not on file    Highest education level: Not on file   Occupational History    Not on file   Social Needs    Financial resource strain: Not on file    Food insecurity:     Worry: Not on file     Inability: Not on file    Transportation needs:     Medical: Not on file     Non-medical: Not on file   Tobacco Use    Smoking status: Never Smoker    Smokeless tobacco: Never Used   Substance and Sexual Activity    Alcohol use: No    Drug use: No    Sexual activity: Never   Lifestyle    Physical activity:     Days per week: Not on file     Minutes per session: Not on file    Stress: Not on file   Relationships    Social connections:     Talks on phone: Not on file     Gets together: Not on file     Attends [employer] service: Not on file     Active member of club or organization: Not on file     Attends meetings of clubs or organizations: Not on file     Relationship status: Not on file    Intimate partner violence:     Fear of current or ex partner: Not on file     Emotionally abused: Not on file     Physically abused: Not on file     Forced sexual activity: Not on file   Other Topics Concern    Not on file   Social History Narrative    Not on file        Family History:       Problem Relation Age of Onset    Stroke Mother 35   Clint Ishihara Rheum Arthritis Mother     COPD Father     Colon Polyps Father     Mult Sclerosis Father 48    Hypothyroidism Brother     Rheum Arthritis Brother     Psoriasis Brother     Arthritis Brother     COPD Brother     Other Brother         Venous stasis    High Blood Pressure Brother     Arthritis Brother     Glaucoma Brother     Hypothyroidism Maternal Grandmother     Deep Vein Thrombosis Maternal Grandmother     Heart Disease Maternal Grandfather     Atrial Fibrillation Paternal Grandmother     Hypothyroidism Paternal Grandmother     Cancer Paternal Grandfather        BP Readings from Last 3 Encounters:   05/21/19 96/65   05/01/19 132/86   03/26/19 133/70       Physical Exam:    Vitals: BP 96/65 (Site: Left Upper Arm, Position: Sitting, Cuff Size: Large Adult)   Pulse 64   Temp 98.1 °F (36.7 °C) (Oral)   Resp 16   Ht 5' 4\" (1.626 m)   Wt 268 lb 12.8 oz (121.9 kg)   LMP  (LMP Unknown)   SpO2 98%   Breastfeeding? No   BMI 46.14 kg/m²     General Appearance: Well-developed. Awake and alert. In no acute distress. Head: Normocephalic and atraumatic. Ears: EACs clear. TMs without erythema or bulging. Nose: Nasal mucosa moist and pink. No polyps visualized. Throat: Oral mucosa moist and pink. Uvula midline. Pharynx without erythema or exudate. Neck: Supple and symmetrical. Thyroid without enlargement or palpable nodules. Lungs: Clear to auscultation bilaterally. Respirations unlabored. No rhonchi/wheezing/rales. Heart: RRR. Normal S1 and S2. + murmur. Abdomen: Bowel sounds normoactive. Soft. Nontender. No masses. Psychiatric: Normal mood and affect. Behavior is normal. Thought content appropriate. Speech is normal.       Assessment and Plan:         1. Hypothyroidism, unspecified type  Stable. Continue levothyroxine 100 mcg daily.   - TSH; Future    2. JESSICA (obstructive sleep apnea)  Recent sleep study, read by Dr. Ildefonso Vega, showed no evidence of JESSICA and showed nocturnal hypoxia. Patient has hx of JESSICA and wears CPAP. Will refer to Pulmonology for further evaluation.   - [facility] ([facility]) - Cropp, Real Moulds, DO, Pulmonary, [place]    3. Breast cancer screening by mammogram  - OPAL DIGITAL SCREEN W CAD BILATERAL; Future    4. Asymptomatic postmenopausal state  - DEXA Bone Density Axial Skeleton; Future    5.  Need for shingles vaccine  Prescription for shingles vaccine given at this visit. Return to Office: Return in about 6 months (around 11/21/2019) for follow-up for hypothyroidism . Medication List:    Current Outpatient Medications   Medication Sig Dispense Refill    levothyroxine (SYNTHROID) 100 MCG tablet Take 1 tablet by mouth Daily 30 tablet 3    metoprolol succinate (TOPROL XL) 50 MG extended release tablet Take 1 tablet by mouth daily 30 tablet 0    polyethyl glycol-propyl glycol 0.4-0.3 % (SYSTANE) 0.4-0.3 % ophthalmic solution 2 drops as needed for Dry Eyes      apixaban (ELIQUIS) 5 MG TABS tablet Take 1 tablet by mouth 2 times daily 180 tablet 3    Multiple Vitamins-Minerals (CENTRUM SILVER ULTRA WOMENS PO) Take by mouth      Calcium-Magnesium-Vitamin D (CALCIUM 1200+D3 PO) Take by mouth      Cholecalciferol (VITAMIN D3) 5000 units TABS Take by mouth daily       Omega-3 Fatty Acids (FISH OIL TRIPLE STRENGTH) 1400 MG CAPS Take by mouth       No current facility-administered medications for this visit.          aNnette Cedeno DO   5/21/19  3:40 PM

## 2019-06-26 ENCOUNTER — HOSPITAL ENCOUNTER (OUTPATIENT)
Dept: MAMMOGRAPHY | Age: 69
Discharge: HOME | End: 2019-06-28
Payer: MEDICARE

## 2019-06-26 DIAGNOSIS — Z78.0 ASYMPTOMATIC POSTMENOPAUSAL STATE: ICD-10-CM

## 2019-06-26 DIAGNOSIS — Z12.31 BREAST CANCER SCREENING BY MAMMOGRAM: ICD-10-CM

## 2019-06-26 PROCEDURE — 77067 SCR MAMMO BI INCL CAD: CPT

## 2019-06-26 PROCEDURE — 77080 DXA BONE DENSITY AXIAL: CPT

## 2019-07-31 ENCOUNTER — HOSPITAL ENCOUNTER (OUTPATIENT)
Age: 69
Discharge: HOME | End: 2019-07-31
Payer: MEDICARE

## 2019-07-31 DIAGNOSIS — E03.9 HYPOTHYROIDISM, UNSPECIFIED TYPE: ICD-10-CM

## 2019-07-31 DIAGNOSIS — E78.2 MIXED HYPERLIPIDEMIA: Primary | ICD-10-CM

## 2019-07-31 DIAGNOSIS — E55.9 VITAMIN D DEFICIENCY: ICD-10-CM

## 2019-07-31 DIAGNOSIS — E78.5 HYPERLIPIDEMIA, UNSPECIFIED HYPERLIPIDEMIA TYPE: ICD-10-CM

## 2019-07-31 DIAGNOSIS — E03.9 ACQUIRED HYPOTHYROIDISM: ICD-10-CM

## 2019-07-31 LAB
ALBUMIN SERPL-MCNC: 4.3 G/DL (ref 3.5–5.2)
ALP BLD-CCNC: 73 U/L (ref 35–104)
ALT SERPL-CCNC: 21 U/L (ref 0–32)
ANION GAP SERPL CALCULATED.3IONS-SCNC: 13 MMOL/L (ref 7–16)
AST SERPL-CCNC: 23 U/L (ref 0–31)
BASOPHILS ABSOLUTE: 0.03 E9/L (ref 0–0.2)
BASOPHILS RELATIVE PERCENT: 0.4 % (ref 0–2)
BILIRUB SERPL-MCNC: 0.4 MG/DL (ref 0–1.2)
BUN BLDV-MCNC: 18 MG/DL (ref 8–23)
CALCIUM SERPL-MCNC: 9.2 MG/DL (ref 8.6–10.2)
CHLORIDE BLD-SCNC: 103 MMOL/L (ref 98–107)
CHOLESTEROL, TOTAL: 214 MG/DL (ref 0–199)
CO2: 25 MMOL/L (ref 22–29)
CREAT SERPL-MCNC: 0.9 MG/DL (ref 0.5–1)
EOSINOPHILS ABSOLUTE: 0.3 E9/L (ref 0.05–0.5)
EOSINOPHILS RELATIVE PERCENT: 3.7 % (ref 0–6)
GFR AFRICAN AMERICAN: >60
GFR NON-AFRICAN AMERICAN: >60 ML/MIN/1.73
GLUCOSE BLD-MCNC: 86 MG/DL (ref 74–99)
HCT VFR BLD CALC: 44.8 % (ref 34–48)
HDLC SERPL-MCNC: 49 MG/DL
HEMOGLOBIN: 14 G/DL (ref 11.5–15.5)
IMMATURE GRANULOCYTES #: 0.03 E9/L
IMMATURE GRANULOCYTES %: 0.4 % (ref 0–5)
LDL CHOLESTEROL CALCULATED: 121 MG/DL (ref 0–99)
LYMPHOCYTES ABSOLUTE: 1.87 E9/L (ref 1.5–4)
LYMPHOCYTES RELATIVE PERCENT: 23.1 % (ref 20–42)
MCH RBC QN AUTO: 27.9 PG (ref 26–35)
MCHC RBC AUTO-ENTMCNC: 31.3 % (ref 32–34.5)
MCV RBC AUTO: 89.4 FL (ref 80–99.9)
MONOCYTES ABSOLUTE: 0.64 E9/L (ref 0.1–0.95)
MONOCYTES RELATIVE PERCENT: 7.9 % (ref 2–12)
NEUTROPHILS ABSOLUTE: 5.21 E9/L (ref 1.8–7.3)
NEUTROPHILS RELATIVE PERCENT: 64.5 % (ref 43–80)
PDW BLD-RTO: 14 FL (ref 11.5–15)
PLATELET # BLD: 227 E9/L (ref 130–450)
PMV BLD AUTO: 11.1 FL (ref 7–12)
POTASSIUM SERPL-SCNC: 4.8 MMOL/L (ref 3.5–5)
RBC # BLD: 5.01 E12/L (ref 3.5–5.5)
SODIUM BLD-SCNC: 141 MMOL/L (ref 132–146)
TOTAL PROTEIN: 7.9 G/DL (ref 6.4–8.3)
TRIGL SERPL-MCNC: 220 MG/DL (ref 0–149)
TSH SERPL DL<=0.05 MIU/L-ACNC: 3.02 UIU/ML (ref 0.27–4.2)
VITAMIN D 25-HYDROXY: 41 NG/ML (ref 30–100)
VLDLC SERPL CALC-MCNC: 44 MG/DL
WBC # BLD: 8.1 E9/L (ref 4.5–11.5)

## 2019-07-31 PROCEDURE — 80053 COMPREHEN METABOLIC PANEL: CPT

## 2019-07-31 PROCEDURE — 36415 COLL VENOUS BLD VENIPUNCTURE: CPT

## 2019-07-31 PROCEDURE — 85025 COMPLETE CBC W/AUTO DIFF WBC: CPT

## 2019-07-31 PROCEDURE — 84443 ASSAY THYROID STIM HORMONE: CPT

## 2019-07-31 PROCEDURE — 82306 VITAMIN D 25 HYDROXY: CPT

## 2019-07-31 PROCEDURE — 80061 LIPID PANEL: CPT

## 2019-07-31 RX ORDER — ATORVASTATIN CALCIUM 40 MG/1
40 TABLET, FILM COATED ORAL NIGHTLY
Qty: 90 TABLET | Refills: 1 | Status: SHIPPED | OUTPATIENT
Start: 2019-07-31 | End: 2020-08-26 | Stop reason: SDUPTHER

## 2019-07-31 RX ORDER — LEVOTHYROXINE SODIUM 0.1 MG/1
100 TABLET ORAL DAILY
Qty: 90 TABLET | Refills: 1 | Status: SHIPPED | OUTPATIENT
Start: 2019-07-31 | End: 2019-12-23 | Stop reason: SDUPTHER

## 2019-08-02 ENCOUNTER — OFFICE VISIT (OUTPATIENT)
Dept: CARDIOLOGY CLINIC | Age: 69
End: 2019-08-02
Payer: MEDICARE

## 2019-08-02 VITALS
BODY MASS INDEX: 45.24 KG/M2 | DIASTOLIC BLOOD PRESSURE: 82 MMHG | HEART RATE: 57 BPM | WEIGHT: 265 LBS | RESPIRATION RATE: 16 BRPM | SYSTOLIC BLOOD PRESSURE: 136 MMHG | HEIGHT: 64 IN

## 2019-08-02 DIAGNOSIS — I48.19 PERSISTENT ATRIAL FIBRILLATION (HCC): Primary | ICD-10-CM

## 2019-08-02 PROCEDURE — 99213 OFFICE O/P EST LOW 20 MIN: CPT | Performed by: INTERNAL MEDICINE

## 2019-08-02 PROCEDURE — 93000 ELECTROCARDIOGRAM COMPLETE: CPT | Performed by: INTERNAL MEDICINE

## 2019-09-17 ENCOUNTER — HOSPITAL ENCOUNTER (OUTPATIENT)
Dept: GENERAL RADIOLOGY | Age: 69
Discharge: HOME | End: 2019-09-19
Payer: MEDICARE

## 2019-09-17 ENCOUNTER — HOSPITAL ENCOUNTER (OUTPATIENT)
Age: 69
Discharge: HOME | End: 2019-09-19
Payer: MEDICARE

## 2019-09-17 ENCOUNTER — OFFICE VISIT (OUTPATIENT)
Dept: FAMILY MEDICINE CLINIC | Age: 69
End: 2019-09-17
Payer: MEDICARE

## 2019-09-17 VITALS
RESPIRATION RATE: 18 BRPM | SYSTOLIC BLOOD PRESSURE: 159 MMHG | HEART RATE: 78 BPM | DIASTOLIC BLOOD PRESSURE: 74 MMHG | TEMPERATURE: 98.2 F | HEIGHT: 64 IN | OXYGEN SATURATION: 98 % | BODY MASS INDEX: 45.75 KG/M2 | WEIGHT: 268 LBS

## 2019-09-17 DIAGNOSIS — M25.562 ACUTE PAIN OF LEFT KNEE: Primary | ICD-10-CM

## 2019-09-17 DIAGNOSIS — M25.562 ACUTE PAIN OF LEFT KNEE: ICD-10-CM

## 2019-09-17 PROCEDURE — 99213 OFFICE O/P EST LOW 20 MIN: CPT | Performed by: FAMILY MEDICINE

## 2019-09-17 PROCEDURE — 73564 X-RAY EXAM KNEE 4 OR MORE: CPT

## 2019-09-17 NOTE — PROGRESS NOTES
the resident note for additional information.       Electronically signed by Karen Peralta MD on 9/17/2019 at 8:48 AM

## 2019-09-26 ENCOUNTER — HOSPITAL ENCOUNTER (OUTPATIENT)
Dept: PHYSICAL THERAPY | Age: 69
Setting detail: THERAPIES SERIES
Discharge: HOME | End: 2019-09-26
Payer: MEDICARE

## 2019-09-26 PROCEDURE — 97161 PT EVAL LOW COMPLEX 20 MIN: CPT

## 2019-09-26 PROCEDURE — G0283 ELEC STIM OTHER THAN WOUND: HCPCS

## 2019-09-30 ENCOUNTER — HOSPITAL ENCOUNTER (OUTPATIENT)
Dept: PHYSICAL THERAPY | Age: 69
Setting detail: THERAPIES SERIES
Discharge: HOME | End: 2019-09-30
Payer: MEDICARE

## 2019-09-30 PROCEDURE — G0283 ELEC STIM OTHER THAN WOUND: HCPCS

## 2019-10-03 ENCOUNTER — HOSPITAL ENCOUNTER (OUTPATIENT)
Dept: PHYSICAL THERAPY | Age: 69
Setting detail: THERAPIES SERIES
Discharge: HOME | End: 2019-10-03
Payer: MEDICARE

## 2019-10-03 PROCEDURE — G0283 ELEC STIM OTHER THAN WOUND: HCPCS

## 2019-10-07 ENCOUNTER — HOSPITAL ENCOUNTER (OUTPATIENT)
Dept: PHYSICAL THERAPY | Age: 69
Setting detail: THERAPIES SERIES
Discharge: HOME | End: 2019-10-07
Payer: MEDICARE

## 2019-10-07 PROCEDURE — 97035 APP MDLTY 1+ULTRASOUND EA 15: CPT

## 2019-10-07 PROCEDURE — G0283 ELEC STIM OTHER THAN WOUND: HCPCS

## 2019-10-08 ENCOUNTER — APPOINTMENT (OUTPATIENT)
Dept: PHYSICAL THERAPY | Age: 69
End: 2019-10-08
Payer: MEDICARE

## 2019-10-11 ENCOUNTER — HOSPITAL ENCOUNTER (OUTPATIENT)
Dept: PHYSICAL THERAPY | Age: 69
Setting detail: THERAPIES SERIES
Discharge: HOME | End: 2019-10-11
Payer: MEDICARE

## 2019-10-11 PROCEDURE — G0283 ELEC STIM OTHER THAN WOUND: HCPCS

## 2019-10-11 PROCEDURE — 97035 APP MDLTY 1+ULTRASOUND EA 15: CPT

## 2019-10-14 ENCOUNTER — HOSPITAL ENCOUNTER (OUTPATIENT)
Dept: PHYSICAL THERAPY | Age: 69
Setting detail: THERAPIES SERIES
Discharge: HOME | End: 2019-10-14
Payer: MEDICARE

## 2019-10-14 PROCEDURE — 97035 APP MDLTY 1+ULTRASOUND EA 15: CPT

## 2019-10-14 PROCEDURE — G0283 ELEC STIM OTHER THAN WOUND: HCPCS

## 2019-10-18 ENCOUNTER — HOSPITAL ENCOUNTER (OUTPATIENT)
Dept: PHYSICAL THERAPY | Age: 69
Setting detail: THERAPIES SERIES
Discharge: HOME | End: 2019-10-18
Payer: MEDICARE

## 2019-10-18 PROCEDURE — G0283 ELEC STIM OTHER THAN WOUND: HCPCS

## 2019-10-18 PROCEDURE — 97035 APP MDLTY 1+ULTRASOUND EA 15: CPT

## 2019-10-21 ENCOUNTER — HOSPITAL ENCOUNTER (OUTPATIENT)
Dept: PHYSICAL THERAPY | Age: 69
Setting detail: THERAPIES SERIES
Discharge: HOME | End: 2019-10-21
Payer: MEDICARE

## 2019-10-21 PROCEDURE — G0283 ELEC STIM OTHER THAN WOUND: HCPCS

## 2019-10-21 PROCEDURE — 97035 APP MDLTY 1+ULTRASOUND EA 15: CPT

## 2019-10-23 ENCOUNTER — HOSPITAL ENCOUNTER (OUTPATIENT)
Dept: PHYSICAL THERAPY | Age: 69
Setting detail: THERAPIES SERIES
Discharge: HOME | End: 2019-10-23
Payer: MEDICARE

## 2019-10-23 PROCEDURE — 97035 APP MDLTY 1+ULTRASOUND EA 15: CPT

## 2019-10-23 PROCEDURE — G0283 ELEC STIM OTHER THAN WOUND: HCPCS

## 2019-12-18 RX ORDER — METOPROLOL SUCCINATE 50 MG/1
50 TABLET, EXTENDED RELEASE ORAL DAILY
Qty: 90 TABLET | Refills: 3 | Status: ON HOLD | OUTPATIENT
Start: 2019-12-18 | End: 2020-12-10 | Stop reason: SDUPTHER

## 2019-12-23 DIAGNOSIS — E03.9 ACQUIRED HYPOTHYROIDISM: ICD-10-CM

## 2019-12-23 RX ORDER — LEVOTHYROXINE SODIUM 0.1 MG/1
100 TABLET ORAL DAILY
Qty: 90 TABLET | Refills: 1 | Status: SHIPPED | OUTPATIENT
Start: 2019-12-23 | End: 2020-08-03

## 2020-07-02 NOTE — PROCEDURES
Patient was given dexa brochure after exam was completed. Patients left forearm was scanned due to plus signs in spine. Detail Level: Detailed Quality 154 Part B: Falls: Risk Screening (Should Be Reported With Measure 155.): Patient screened for future fall risk; documentation of no falls in the past year or only one fall without injury in the past year Quality 47: Advance Care Plan: Advance care planning not documented, reason not otherwise specified. Quality 402: Tobacco Use And Help With Quitting Among Adolescents: Patient screened for tobacco and is a smoker AND received Cessation Counseling Quality 111:Pneumonia Vaccination Status For Older Adults: Pneumococcal Vaccination not Administered or Previously Received, Reason not Otherwise Specified Quality 431: Preventive Care And Screening: Unhealthy Alcohol Use - Screening: Patient screened for unhealthy alcohol use using a single question and scores less than 2 times per year Quality 154 Part A: Falls: Risk Assessment (Should Be Reported With Measure 155.): Falls risk assessment completed and documented in the past 12 months. Quality 155 (Denominator): Falls Plan Of Care: Plan of Care not Documented, Reason not Otherwise Specified Quality 110: Preventive Care And Screening: Influenza Immunization: Influenza Immunization previously received during influenza season

## 2020-07-30 NOTE — TELEPHONE ENCOUNTER
Last Appointment   9/17/2019  Next Appointment  Visit date not found    Left message for patient to return call to schedule an appointment with .

## 2020-08-03 RX ORDER — LEVOTHYROXINE SODIUM 0.1 MG/1
TABLET ORAL
Qty: 90 TABLET | Refills: 3 | Status: SHIPPED
Start: 2020-08-03 | End: 2020-08-26

## 2020-08-03 NOTE — TELEPHONE ENCOUNTER
Medication approved. Patient needs an appointment with me for follow-up of her hypothyroidism. Thanks!

## 2020-08-04 NOTE — TELEPHONE ENCOUNTER
Will order labs today--TSH, lipid panel, CBC, CMP, and vitamin D. Patient can have these drawn in the office or she can go to [facility] to have them drawn. I will see her in the office on 08/26/2020. Thanks!

## 2020-08-19 ENCOUNTER — HOSPITAL ENCOUNTER (OUTPATIENT)
Age: 70
Discharge: HOME | End: 2020-08-21
Payer: MEDICARE

## 2020-08-19 LAB
ALBUMIN SERPL-MCNC: 4.4 G/DL (ref 3.5–5.2)
ALP BLD-CCNC: 94 U/L (ref 35–104)
ALT SERPL-CCNC: 34 U/L (ref 0–32)
ANION GAP SERPL CALCULATED.3IONS-SCNC: 16 MMOL/L (ref 7–16)
AST SERPL-CCNC: 32 U/L (ref 0–31)
BASOPHILS ABSOLUTE: 0.02 E9/L (ref 0–0.2)
BASOPHILS RELATIVE PERCENT: 0.3 % (ref 0–2)
BILIRUB SERPL-MCNC: 0.6 MG/DL (ref 0–1.2)
BUN BLDV-MCNC: 17 MG/DL (ref 8–23)
CALCIUM SERPL-MCNC: 9.3 MG/DL (ref 8.6–10.2)
CHLORIDE BLD-SCNC: 101 MMOL/L (ref 98–107)
CHOLESTEROL, TOTAL: 154 MG/DL (ref 0–199)
CO2: 22 MMOL/L (ref 22–29)
CREAT SERPL-MCNC: 0.9 MG/DL (ref 0.5–1)
EOSINOPHILS ABSOLUTE: 0.43 E9/L (ref 0.05–0.5)
EOSINOPHILS RELATIVE PERCENT: 6.1 % (ref 0–6)
GFR AFRICAN AMERICAN: >60
GFR NON-AFRICAN AMERICAN: >60 ML/MIN/1.73
GLUCOSE BLD-MCNC: 98 MG/DL (ref 74–99)
HCT VFR BLD CALC: 43 % (ref 34–48)
HDLC SERPL-MCNC: 46 MG/DL
HEMOGLOBIN: 13.6 G/DL (ref 11.5–15.5)
IMMATURE GRANULOCYTES #: 0.02 E9/L
IMMATURE GRANULOCYTES %: 0.3 % (ref 0–5)
LDL CHOLESTEROL CALCULATED: 63 MG/DL (ref 0–99)
LYMPHOCYTES ABSOLUTE: 1.57 E9/L (ref 1.5–4)
LYMPHOCYTES RELATIVE PERCENT: 22.4 % (ref 20–42)
MCH RBC QN AUTO: 28.6 PG (ref 26–35)
MCHC RBC AUTO-ENTMCNC: 31.6 % (ref 32–34.5)
MCV RBC AUTO: 90.5 FL (ref 80–99.9)
MONOCYTES ABSOLUTE: 0.61 E9/L (ref 0.1–0.95)
MONOCYTES RELATIVE PERCENT: 8.7 % (ref 2–12)
NEUTROPHILS ABSOLUTE: 4.36 E9/L (ref 1.8–7.3)
NEUTROPHILS RELATIVE PERCENT: 62.2 % (ref 43–80)
PDW BLD-RTO: 14.2 FL (ref 11.5–15)
PLATELET # BLD: 217 E9/L (ref 130–450)
PMV BLD AUTO: 11.7 FL (ref 7–12)
POTASSIUM SERPL-SCNC: 4.3 MMOL/L (ref 3.5–5)
RBC # BLD: 4.75 E12/L (ref 3.5–5.5)
SODIUM BLD-SCNC: 139 MMOL/L (ref 132–146)
TOTAL PROTEIN: 7.6 G/DL (ref 6.4–8.3)
TRIGL SERPL-MCNC: 227 MG/DL (ref 0–149)
TSH SERPL DL<=0.05 MIU/L-ACNC: 10.3 UIU/ML (ref 0.27–4.2)
VITAMIN D 25-HYDROXY: 35 NG/ML (ref 30–100)
VLDLC SERPL CALC-MCNC: 45 MG/DL
WBC # BLD: 7 E9/L (ref 4.5–11.5)

## 2020-08-19 PROCEDURE — 80061 LIPID PANEL: CPT

## 2020-08-19 PROCEDURE — 84443 ASSAY THYROID STIM HORMONE: CPT

## 2020-08-19 PROCEDURE — 82306 VITAMIN D 25 HYDROXY: CPT

## 2020-08-19 PROCEDURE — 80053 COMPREHEN METABOLIC PANEL: CPT

## 2020-08-19 PROCEDURE — 36415 COLL VENOUS BLD VENIPUNCTURE: CPT

## 2020-08-19 PROCEDURE — 85025 COMPLETE CBC W/AUTO DIFF WBC: CPT

## 2020-08-26 ENCOUNTER — OFFICE VISIT (OUTPATIENT)
Dept: FAMILY MEDICINE CLINIC | Age: 70
End: 2020-08-26
Payer: MEDICARE

## 2020-08-26 VITALS
TEMPERATURE: 97 F | HEART RATE: 78 BPM | HEIGHT: 64 IN | DIASTOLIC BLOOD PRESSURE: 87 MMHG | RESPIRATION RATE: 16 BRPM | BODY MASS INDEX: 45.75 KG/M2 | OXYGEN SATURATION: 98 % | SYSTOLIC BLOOD PRESSURE: 138 MMHG | WEIGHT: 268 LBS

## 2020-08-26 PROCEDURE — 99213 OFFICE O/P EST LOW 20 MIN: CPT | Performed by: FAMILY MEDICINE

## 2020-08-26 PROCEDURE — 90732 PPSV23 VACC 2 YRS+ SUBQ/IM: CPT | Performed by: FAMILY MEDICINE

## 2020-08-26 PROCEDURE — G0009 ADMIN PNEUMOCOCCAL VACCINE: HCPCS | Performed by: FAMILY MEDICINE

## 2020-08-26 RX ORDER — LEVOTHYROXINE SODIUM 112 UG/1
112 TABLET ORAL DAILY
Qty: 30 TABLET | Refills: 2 | Status: SHIPPED
Start: 2020-08-26 | End: 2020-11-25 | Stop reason: SDUPTHER

## 2020-08-26 RX ORDER — ATORVASTATIN CALCIUM 40 MG/1
40 TABLET, FILM COATED ORAL NIGHTLY
Qty: 90 TABLET | Refills: 1 | Status: SHIPPED
Start: 2020-08-26 | End: 2021-01-29 | Stop reason: SDUPTHER

## 2020-08-26 ASSESSMENT — ENCOUNTER SYMPTOMS
WHEEZING: 0
RHINORRHEA: 0
SHORTNESS OF BREATH: 0
COUGH: 0
SORE THROAT: 0
ABDOMINAL PAIN: 0
COLOR CHANGE: 0
DIARRHEA: 0
NAUSEA: 0
EYE PAIN: 0
BLOOD IN STOOL: 0
VOMITING: 0
CONSTIPATION: 0

## 2020-08-26 NOTE — PROGRESS NOTES
Follow up of hypothyroidism  Recent TSH 10.3  Complains of fatigue  Examination  Blood pressure 138/87, pulse 78, temperature 97 °F (36.1 °C), temperature source Temporal, resp. rate 16, height 5' 4\" (1.626 m), weight 268 lb (121.6 kg), SpO2 98 %, not currently breastfeeding. stable exam  A/P  Increase synthroid and continue to monitor  Attending Physician Statement  I have discussed the case, including pertinent history and exam findings with the resident. I agree with the documented assessment and plan.

## 2020-08-26 NOTE — PROGRESS NOTES
2020    Brooklynn Esquivel is a 81 y.o. female here for the following:    Chief Complaint   Patient presents with    Hypothyroidism     pt states that she has been feeling more tired         HPI:  Hypothyroidism   - Last TSH from 2020 was 10.300.  - On Synthroid 100 mcg QD. No side effects.   - Reports fatigue and dry skin. - Denies constipation and hair loss. Current Outpatient Medications   Medication Sig Dispense Refill    atorvastatin (LIPITOR) 40 MG tablet Take 1 tablet by mouth nightly 90 tablet 1    levothyroxine (SYNTHROID) 112 MCG tablet Take 1 tablet by mouth daily 30 tablet 2    apixaban (ELIQUIS) 5 MG TABS tablet Take 1 tablet by mouth 2 times daily 180 tablet 3    metoprolol succinate (TOPROL XL) 50 MG extended release tablet Take 1 tablet by mouth daily 90 tablet 3    polyethyl glycol-propyl glycol 0.4-0.3 % (SYSTANE) 0.4-0.3 % ophthalmic solution 2 drops as needed for Dry Eyes      Multiple Vitamins-Minerals (CENTRUM SILVER ULTRA WOMENS PO) Take by mouth      Calcium-Magnesium-Vitamin D (CALCIUM 1200+D3 PO) Take by mouth      Cholecalciferol (VITAMIN D3) 5000 units TABS Take by mouth daily       Omega-3 Fatty Acids (FISH OIL TRIPLE STRENGTH) 1400 MG CAPS Take by mouth       No current facility-administered medications for this visit.        No Known Allergies    Past Medical & Surgical History:      Diagnosis Date    Arthritis     Atrial fibrillation ([address] 2019    Dr. [facility]el[facility]    Hypothyroidism     Sleep apnea     On CPAP, wears nightly      Past Surgical History:   Procedure Laterality Date     SECTION      x2    COLONOSCOPY      HYSTERECTOMY, TOTAL ABDOMINAL      WISDOM TOOTH EXTRACTION         Family History:      Problem Relation Age of Onset    Stroke Mother 35   [employer] Rheum Arthritis Mother     COPD Father     Colon Polyps Father     Mult Sclerosis Father 48    Hypothyroidism Brother     Rheum Arthritis Brother     Psoriasis Brother     Arthritis Brother     COPD Brother     Other Brother         Venous stasis    High Blood Pressure Brother     Arthritis Brother     Glaucoma Brother     Hypothyroidism Maternal Grandmother     Deep Vein Thrombosis Maternal Grandmother     Heart Disease Maternal Grandfather     Atrial Fibrillation Paternal Grandmother     Hypothyroidism Paternal Grandmother     Cancer Paternal Grandfather        Social History:  Social History     Tobacco Use    Smoking status: Never Smoker    Smokeless tobacco: Never Used   Substance Use Topics    Alcohol use: No       Immunization History   Administered Date(s) Administered    Influenza, High Dose (Fluzone 65 yrs and older) 11/29/2018    Pneumococcal Conjugate 13-valent ([id number]) 01/02/2019    Pneumococcal Polysaccharide ([id number]) 08/26/2020    Tdap (Boostrix, Adacel) 03/26/2019       Review of Systems   Constitutional: Negative for chills, fever and unexpected weight change. HENT: Negative for congestion, ear pain, postnasal drip, rhinorrhea and sore throat. Eyes: Negative for pain and visual disturbance. Admits to dry eyes. Respiratory: Negative for cough, shortness of breath and wheezing. Cardiovascular: Negative for chest pain, palpitations and leg swelling. Gastrointestinal: Negative for abdominal pain, blood in stool, constipation, diarrhea, nausea and vomiting. Skin: Negative for color change and rash. Neurological: Negative for dizziness, syncope and light-headedness. Psychiatric/Behavioral: Negative for dysphoric mood. The patient is not nervous/anxious. BP Readings from Last 3 Encounters:   08/26/20 138/87   01/22/20 138/74   09/17/19 (!) 159/74       VS:  /87   Pulse 78   Temp 97 °F (36.1 °C) (Temporal)   Resp 16   Ht 5' 4\" (1.626 m)   Wt 268 lb (121.6 kg)   LMP  (LMP Unknown)   SpO2 98%   BMI 46.00 kg/m²     Physical Exam  Vitals signs reviewed. Constitutional:       General: She is awake.  She is Future    6. Encounter for screening mammogram for malignant neoplasm of breast   - OPAL DIGITAL SCREEN BILATERAL PER PROTOCOL; Future      Return in about 3 months (around 11/26/2020) for Medicare AWV.     Triny Diane DO, PGY-3

## 2020-08-31 ENCOUNTER — OFFICE VISIT (OUTPATIENT)
Dept: CARDIOLOGY CLINIC | Age: 70
End: 2020-08-31
Payer: MEDICARE

## 2020-08-31 VITALS
SYSTOLIC BLOOD PRESSURE: 132 MMHG | BODY MASS INDEX: 46.26 KG/M2 | WEIGHT: 271 LBS | DIASTOLIC BLOOD PRESSURE: 86 MMHG | HEART RATE: 62 BPM | RESPIRATION RATE: 16 BRPM | HEIGHT: 64 IN

## 2020-08-31 PROCEDURE — 93000 ELECTROCARDIOGRAM COMPLETE: CPT | Performed by: INTERNAL MEDICINE

## 2020-08-31 PROCEDURE — 99213 OFFICE O/P EST LOW 20 MIN: CPT | Performed by: INTERNAL MEDICINE

## 2020-08-31 NOTE — PROGRESS NOTES
Chief Complaint   Patient presents with    Atrial Fibrillation       Patient Active Problem List    Diagnosis Date Noted    Sleep apnea      Overview Note:     On CPAP, wears nightly       Paroxysmal atrial fibrillation ([address] 02/13/2019    SOB (shortness of breath) 01/26/2019     Overview Note:     A. Echo 12/14/2018 ([facility]): normal  B. ETT-N :  1/3/2019 [facility]): normal nuke, deconditioned response to exercise      Mixed hyperlipidemia 11/29/2018    Vitamin D deficiency 03/28/2018       Current Outpatient Medications   Medication Sig Dispense Refill    atorvastatin (LIPITOR) 40 MG tablet Take 1 tablet by mouth nightly 90 tablet 1    levothyroxine (SYNTHROID) 112 MCG tablet Take 1 tablet by mouth daily 30 tablet 2    apixaban (ELIQUIS) 5 MG TABS tablet Take 1 tablet by mouth 2 times daily 180 tablet 3    metoprolol succinate (TOPROL XL) 50 MG extended release tablet Take 1 tablet by mouth daily 90 tablet 3    polyethyl glycol-propyl glycol 0.4-0.3 % (SYSTANE) 0.4-0.3 % ophthalmic solution 2 drops as needed for Dry Eyes      Multiple Vitamins-Minerals (CENTRUM SILVER ULTRA WOMENS PO) Take by mouth      Calcium-Magnesium-Vitamin D (CALCIUM 1200+D3 PO) Take by mouth      Cholecalciferol (VITAMIN D3) 5000 units TABS Take by mouth daily       Omega-3 Fatty Acids (FISH OIL TRIPLE STRENGTH) 1400 MG CAPS Take by mouth       No current facility-administered medications for this visit.          No Known Allergies    Vitals:    08/31/20 0706   BP: 132/86   Pulse: 62   Resp: 16   Weight: 271 lb (122.9 kg)   Height: 5' 4\" (1.626 m)       Social History     Socioeconomic History    Marital status:      Spouse name: Not on file    Number of children: Not on file    Years of education: Not on file    Highest education level: Not on file   Occupational History    Not on file   Social Needs    Financial resource strain: Not on file    Food insecurity     Worry: Not on file     Inability: Not on file   Alex Jim Transportation needs     Medical: Not on file     Non-medical: Not on file   Tobacco Use    Smoking status: Never Smoker    Smokeless tobacco: Never Used   Substance and Sexual Activity    Alcohol use: No    Drug use: No    Sexual activity: Not Currently   Lifestyle    Physical activity     Days per week: Not on file     Minutes per session: Not on file    Stress: Not on file   Relationships    Social connections     Talks on phone: Not on file     Gets together: Not on file     Attends Bahai service: Not on file     Active member of club or organization: Not on file     Attends meetings of clubs or organizations: Not on file     Relationship status: Not on file    Intimate partner violence     Fear of current or ex partner: Not on file     Emotionally abused: Not on file     Physically abused: Not on file     Forced sexual activity: Not on file   Other Topics Concern    Not on file   Social History Narrative    Not on file       Family History   Problem Relation Age of Onset    Stroke Mother 35   [facility] Rheum Arthritis Mother     COPD Father     Colon Polyps Father     Mult Sclerosis Father 48    Hypothyroidism Brother     Rheum Arthritis Brother     Psoriasis Brother     Arthritis Brother     COPD Brother     Other Brother         Venous stasis    High Blood Pressure Brother     Arthritis Brother     Glaucoma Brother     Hypothyroidism Maternal Grandmother     Deep Vein Thrombosis Maternal Grandmother     Heart Disease Maternal Grandfather     Atrial Fibrillation Paternal Grandmother     Hypothyroidism Paternal Grandmother     Cancer Paternal Grandfather          SUBJECTIVE: Kurt Booth presents to the office today for followup of afib. She complains of NO  dyspnea and fatigue and denies   chest pain and paroxysmal nocturnal dyspnea. No hx cva, dm,htn,bleeds, afib. Not exercising. Is using her CPAP. No alcohol.  No neuro symptoms        Review of Systems:   Heart: as above   Lungs: as above   Eyes: denies changes in vision or discharge. Ears: denies changes in hearing or pain. Nose: denies epistaxis or masses   Throat: denies sore throat or trouble swallowing. Neuro: denies numbness, tingling, tremors. Skin: denies rashes or itching. : denies hematuria, dysuria   GI: denies vomiting, diarrhea   Psych: denies mood changed, anxiety, depression. all others negative. Physical Exam   /86   Pulse 62   Resp 16   Ht 5' 4\" (1.626 m)   Wt 271 lb (122.9 kg)   LMP  (LMP Unknown)   BMI 46.52 kg/m²   Constitutional: Oriented to person, place, and time. Obese No distress. Head: Normocephalic and atraumatic. Eyes: EOM are normal. Pupils are equal, round, and reactive to light. Neck: Normal range of motion. Neck supple. No hepatojugular reflux and no JVD present. Carotid bruit is not present. No tracheal deviation present. No thyromegaly present. Cardiovascular: normal rate, regular rhythm, normal heart sounds and intact distal pulses. Exam reveals no gallop and no friction rub. No murmur heard. Pulmonary/Chest: Effort normal and breath sounds normal. No respiratory distress. No wheezes. No rales. No tenderness. Abdominal: Soft. Bowel sounds are normal. No distension and no mass. No tenderness. No rebound and no guarding. Musculoskeletal: Normal range of motion. No edema and no tenderness. Neurological: Alert and oriented to person, place, and time. Skin: Skin is warm and dry. No rash noted. Not diaphoretic. No erythema. Psychiatric: Normal mood and affect.  Behavior is normal.     EKG:  Nsr with PACs , normal    ASSESSMENT AND PLAN:  Patient Active Problem List   Diagnosis    Vitamin D deficiency    Mixed hyperlipidemia    SOB (shortness of breath)    Paroxysmal atrial fibrillation (HCC)    Sleep apnea     Paroxysmal afib, non valvular, chads - vasc = 2.  in sinus today on BB and NOAC  On CPAP, no substance abuse or alcohol  Discussed exercise regimen at length once again  Discussed need for significant weight reduction   Ok for colonoscopy - hold eliquis 48 hours pre-procedure and restart ASAP     The patient was educated as to the symptoms that would require a prompt return to our office.   Return visit in 1 year    Bing Mensah M.D  [id number] [facility]

## 2020-11-16 DIAGNOSIS — E78.2 MIXED HYPERLIPIDEMIA: ICD-10-CM

## 2020-11-16 DIAGNOSIS — E03.9 ACQUIRED HYPOTHYROIDISM: ICD-10-CM

## 2020-11-16 DIAGNOSIS — Z11.59 NEED FOR HEPATITIS C SCREENING TEST: ICD-10-CM

## 2020-11-16 LAB
ALBUMIN SERPL-MCNC: 4.3 G/DL (ref 3.5–5.2)
ALP BLD-CCNC: 93 U/L (ref 35–104)
ALT SERPL-CCNC: 32 U/L (ref 0–32)
ANION GAP SERPL CALCULATED.3IONS-SCNC: 9 MMOL/L (ref 7–16)
AST SERPL-CCNC: 29 U/L (ref 0–31)
BILIRUB SERPL-MCNC: 0.6 MG/DL (ref 0–1.2)
BUN BLDV-MCNC: 20 MG/DL (ref 8–23)
CALCIUM SERPL-MCNC: 9.6 MG/DL (ref 8.6–10.2)
CHLORIDE BLD-SCNC: 102 MMOL/L (ref 98–107)
CO2: 29 MMOL/L (ref 22–29)
CREAT SERPL-MCNC: 1 MG/DL (ref 0.5–1)
GFR AFRICAN AMERICAN: >60
GFR NON-AFRICAN AMERICAN: 55 ML/MIN/1.73
GLUCOSE BLD-MCNC: 90 MG/DL (ref 74–99)
POTASSIUM SERPL-SCNC: 4.8 MMOL/L (ref 3.5–5)
SODIUM BLD-SCNC: 140 MMOL/L (ref 132–146)
TOTAL PROTEIN: 7.7 G/DL (ref 6.4–8.3)
TSH SERPL DL<=0.05 MIU/L-ACNC: 1.16 UIU/ML (ref 0.27–4.2)

## 2020-11-17 LAB — HEPATITIS C ANTIBODY INTERPRETATION: NORMAL

## 2020-11-25 RX ORDER — LEVOTHYROXINE SODIUM 112 UG/1
112 TABLET ORAL DAILY
Qty: 90 TABLET | Refills: 2 | Status: SHIPPED
Start: 2020-11-25 | End: 2021-08-16 | Stop reason: SDUPTHER

## 2020-11-25 RX ORDER — LEVOTHYROXINE SODIUM 112 UG/1
112 TABLET ORAL DAILY
Qty: 30 TABLET | Refills: 2 | Status: SHIPPED
Start: 2020-11-25 | End: 2020-11-25 | Stop reason: SDUPTHER

## 2020-12-07 ENCOUNTER — TELEPHONE (OUTPATIENT)
Dept: FAMILY MEDICINE CLINIC | Age: 70
End: 2020-12-07

## 2020-12-07 ENCOUNTER — APPOINTMENT (OUTPATIENT)
Dept: GENERAL RADIOLOGY | Age: 70
DRG: 872 | End: 2020-12-07
Payer: MEDICARE

## 2020-12-07 ENCOUNTER — VIRTUAL VISIT (OUTPATIENT)
Dept: FAMILY MEDICINE CLINIC | Age: 70
End: 2020-12-07
Payer: MEDICARE

## 2020-12-07 ENCOUNTER — HOSPITAL ENCOUNTER (INPATIENT)
Age: 70
LOS: 1 days | Discharge: HOME | DRG: 872 | End: 2020-12-10
Attending: EMERGENCY MEDICINE | Admitting: FAMILY MEDICINE
Payer: MEDICARE

## 2020-12-07 PROBLEM — L03.90 CELLULITIS: Status: ACTIVE | Noted: 2020-12-07

## 2020-12-07 LAB
ALBUMIN SERPL-MCNC: 4.1 G/DL (ref 3.5–5.2)
ALP BLD-CCNC: 106 U/L (ref 35–104)
ALT SERPL-CCNC: 23 U/L (ref 0–32)
ANION GAP SERPL CALCULATED.3IONS-SCNC: 11 MMOL/L (ref 7–16)
AST SERPL-CCNC: 19 U/L (ref 0–31)
BACTERIA: ABNORMAL /HPF
BASOPHILS ABSOLUTE: 0.05 E9/L (ref 0–0.2)
BASOPHILS RELATIVE PERCENT: 0.3 % (ref 0–2)
BILIRUB SERPL-MCNC: 1.1 MG/DL (ref 0–1.2)
BILIRUBIN URINE: NEGATIVE
BLOOD, URINE: ABNORMAL
BUN BLDV-MCNC: 13 MG/DL (ref 8–23)
CALCIUM SERPL-MCNC: 9.2 MG/DL (ref 8.6–10.2)
CHLORIDE BLD-SCNC: 99 MMOL/L (ref 98–107)
CLARITY: CLEAR
CO2: 23 MMOL/L (ref 22–29)
COLOR: YELLOW
CREAT SERPL-MCNC: 0.8 MG/DL (ref 0.5–1)
EOSINOPHILS ABSOLUTE: 0 E9/L (ref 0.05–0.5)
EOSINOPHILS RELATIVE PERCENT: 0 % (ref 0–6)
EPITHELIAL CELLS, UA: ABNORMAL /HPF
GFR AFRICAN AMERICAN: >60
GFR NON-AFRICAN AMERICAN: >60 ML/MIN/1.73
GLUCOSE BLD-MCNC: 132 MG/DL (ref 74–99)
GLUCOSE URINE: NEGATIVE MG/DL
HCT VFR BLD CALC: 45.8 % (ref 34–48)
HEMOGLOBIN: 14.6 G/DL (ref 11.5–15.5)
IMMATURE GRANULOCYTES #: 0.08 E9/L
IMMATURE GRANULOCYTES %: 0.4 % (ref 0–5)
KETONES, URINE: 40 MG/DL
LACTIC ACID: 1.4 MMOL/L (ref 0.5–2.2)
LEUKOCYTE ESTERASE, URINE: NEGATIVE
LYMPHOCYTES ABSOLUTE: 0.97 E9/L (ref 1.5–4)
LYMPHOCYTES RELATIVE PERCENT: 4.9 % (ref 20–42)
MCH RBC QN AUTO: 28.5 PG (ref 26–35)
MCHC RBC AUTO-ENTMCNC: 31.9 % (ref 32–34.5)
MCV RBC AUTO: 89.5 FL (ref 80–99.9)
MONOCYTES ABSOLUTE: 1.34 E9/L (ref 0.1–0.95)
MONOCYTES RELATIVE PERCENT: 6.8 % (ref 2–12)
NEUTROPHILS ABSOLUTE: 17.2 E9/L (ref 1.8–7.3)
NEUTROPHILS RELATIVE PERCENT: 87.6 % (ref 43–80)
NITRITE, URINE: NEGATIVE
PDW BLD-RTO: 13.3 FL (ref 11.5–15)
PH UA: 7 (ref 5–9)
PLATELET # BLD: 234 E9/L (ref 130–450)
PMV BLD AUTO: 11.5 FL (ref 7–12)
POTASSIUM SERPL-SCNC: 4.1 MMOL/L (ref 3.5–5)
PROTEIN UA: 30 MG/DL
RBC # BLD: 5.12 E12/L (ref 3.5–5.5)
RBC UA: ABNORMAL /HPF (ref 0–2)
SODIUM BLD-SCNC: 133 MMOL/L (ref 132–146)
SPECIFIC GRAVITY UA: 1.02 (ref 1–1.03)
TOTAL PROTEIN: 8.1 G/DL (ref 6.4–8.3)
UROBILINOGEN, URINE: 1 E.U./DL
WBC # BLD: 19.6 E9/L (ref 4.5–11.5)
WBC UA: ABNORMAL /HPF (ref 0–5)

## 2020-12-07 PROCEDURE — 87040 BLOOD CULTURE FOR BACTERIA: CPT

## 2020-12-07 PROCEDURE — 2580000003 HC RX 258: Performed by: FAMILY MEDICINE

## 2020-12-07 PROCEDURE — G0378 HOSPITAL OBSERVATION PER HR: HCPCS

## 2020-12-07 PROCEDURE — 99213 OFFICE O/P EST LOW 20 MIN: CPT | Performed by: FAMILY MEDICINE

## 2020-12-07 PROCEDURE — 93005 ELECTROCARDIOGRAM TRACING: CPT | Performed by: FAMILY MEDICINE

## 2020-12-07 PROCEDURE — 6370000000 HC RX 637 (ALT 250 FOR IP): Performed by: FAMILY MEDICINE

## 2020-12-07 PROCEDURE — 96361 HYDRATE IV INFUSION ADD-ON: CPT

## 2020-12-07 PROCEDURE — 2580000003 HC RX 258: Performed by: PHYSICIAN ASSISTANT

## 2020-12-07 PROCEDURE — 87088 URINE BACTERIA CULTURE: CPT

## 2020-12-07 PROCEDURE — 6360000002 HC RX W HCPCS: Performed by: STUDENT IN AN ORGANIZED HEALTH CARE EDUCATION/TRAINING PROGRAM

## 2020-12-07 PROCEDURE — 6370000000 HC RX 637 (ALT 250 FOR IP): Performed by: STUDENT IN AN ORGANIZED HEALTH CARE EDUCATION/TRAINING PROGRAM

## 2020-12-07 PROCEDURE — 87186 SC STD MICRODIL/AGAR DIL: CPT

## 2020-12-07 PROCEDURE — 85025 COMPLETE CBC W/AUTO DIFF WBC: CPT

## 2020-12-07 PROCEDURE — 96374 THER/PROPH/DIAG INJ IV PUSH: CPT

## 2020-12-07 PROCEDURE — 99284 EMERGENCY DEPT VISIT MOD MDM: CPT

## 2020-12-07 PROCEDURE — 71045 X-RAY EXAM CHEST 1 VIEW: CPT

## 2020-12-07 PROCEDURE — 83605 ASSAY OF LACTIC ACID: CPT

## 2020-12-07 PROCEDURE — 81001 URINALYSIS AUTO W/SCOPE: CPT

## 2020-12-07 PROCEDURE — 80053 COMPREHEN METABOLIC PANEL: CPT

## 2020-12-07 RX ORDER — POLYETHYLENE GLYCOL 3350 17 G/17G
17 POWDER, FOR SOLUTION ORAL DAILY PRN
Status: DISCONTINUED | OUTPATIENT
Start: 2020-12-07 | End: 2020-12-10 | Stop reason: HOSPADM

## 2020-12-07 RX ORDER — METOPROLOL SUCCINATE 50 MG/1
50 TABLET, EXTENDED RELEASE ORAL DAILY
Status: DISCONTINUED | OUTPATIENT
Start: 2020-12-08 | End: 2020-12-10 | Stop reason: HOSPADM

## 2020-12-07 RX ORDER — SODIUM CHLORIDE 0.9 % (FLUSH) 0.9 %
10 SYRINGE (ML) INJECTION EVERY 12 HOURS SCHEDULED
Status: DISCONTINUED | OUTPATIENT
Start: 2020-12-07 | End: 2020-12-10 | Stop reason: HOSPADM

## 2020-12-07 RX ORDER — SODIUM CHLORIDE 0.9 % (FLUSH) 0.9 %
10 SYRINGE (ML) INJECTION PRN
Status: DISCONTINUED | OUTPATIENT
Start: 2020-12-07 | End: 2020-12-10 | Stop reason: HOSPADM

## 2020-12-07 RX ORDER — ACETAMINOPHEN 650 MG/1
650 SUPPOSITORY RECTAL EVERY 6 HOURS PRN
Status: DISCONTINUED | OUTPATIENT
Start: 2020-12-07 | End: 2020-12-10 | Stop reason: HOSPADM

## 2020-12-07 RX ORDER — ACETAMINOPHEN 500 MG
1000 TABLET ORAL ONCE
Status: COMPLETED | OUTPATIENT
Start: 2020-12-07 | End: 2020-12-07

## 2020-12-07 RX ORDER — DOXYCYCLINE HYCLATE 100 MG/1
100 CAPSULE ORAL EVERY 12 HOURS SCHEDULED
Status: DISCONTINUED | OUTPATIENT
Start: 2020-12-07 | End: 2020-12-10 | Stop reason: HOSPADM

## 2020-12-07 RX ORDER — ACETAMINOPHEN 325 MG/1
650 TABLET ORAL EVERY 6 HOURS PRN
Status: DISCONTINUED | OUTPATIENT
Start: 2020-12-07 | End: 2020-12-10 | Stop reason: HOSPADM

## 2020-12-07 RX ORDER — SODIUM CHLORIDE 9 MG/ML
INJECTION, SOLUTION INTRAVENOUS CONTINUOUS
Status: DISCONTINUED | OUTPATIENT
Start: 2020-12-07 | End: 2020-12-08

## 2020-12-07 RX ORDER — PROMETHAZINE HYDROCHLORIDE 25 MG/1
12.5 TABLET ORAL EVERY 6 HOURS PRN
Status: DISCONTINUED | OUTPATIENT
Start: 2020-12-07 | End: 2020-12-10 | Stop reason: HOSPADM

## 2020-12-07 RX ORDER — LEVOTHYROXINE SODIUM 112 UG/1
112 TABLET ORAL DAILY
Status: DISCONTINUED | OUTPATIENT
Start: 2020-12-08 | End: 2020-12-10 | Stop reason: HOSPADM

## 2020-12-07 RX ORDER — ONDANSETRON 2 MG/ML
4 INJECTION INTRAMUSCULAR; INTRAVENOUS EVERY 6 HOURS PRN
Status: DISCONTINUED | OUTPATIENT
Start: 2020-12-07 | End: 2020-12-10 | Stop reason: HOSPADM

## 2020-12-07 RX ORDER — 0.9 % SODIUM CHLORIDE 0.9 %
1000 INTRAVENOUS SOLUTION INTRAVENOUS ONCE
Status: COMPLETED | OUTPATIENT
Start: 2020-12-07 | End: 2020-12-07

## 2020-12-07 RX ORDER — ATORVASTATIN CALCIUM 40 MG/1
40 TABLET, FILM COATED ORAL NIGHTLY
Status: DISCONTINUED | OUTPATIENT
Start: 2020-12-07 | End: 2020-12-10 | Stop reason: HOSPADM

## 2020-12-07 RX ADMIN — SODIUM CHLORIDE 1000 ML: 9 INJECTION, SOLUTION INTRAVENOUS at 17:06

## 2020-12-07 RX ADMIN — ACETAMINOPHEN 1000 MG: 500 TABLET ORAL at 17:31

## 2020-12-07 RX ADMIN — DOXYCYCLINE HYCLATE 100 MG: 100 CAPSULE ORAL at 21:44

## 2020-12-07 RX ADMIN — SODIUM CHLORIDE, PRESERVATIVE FREE 10 ML: 5 INJECTION INTRAVENOUS at 21:50

## 2020-12-07 RX ADMIN — Medication 2 G: at 17:33

## 2020-12-07 RX ADMIN — ATORVASTATIN CALCIUM 40 MG: 40 TABLET, FILM COATED ORAL at 21:44

## 2020-12-07 RX ADMIN — ACETAMINOPHEN 650 MG: 325 TABLET ORAL at 22:49

## 2020-12-07 RX ADMIN — SODIUM CHLORIDE: 9 INJECTION, SOLUTION INTRAVENOUS at 21:50

## 2020-12-07 RX ADMIN — APIXABAN 5 MG: 5 TABLET, FILM COATED ORAL at 21:44

## 2020-12-07 ASSESSMENT — ENCOUNTER SYMPTOMS
SHORTNESS OF BREATH: 0
CHEST TIGHTNESS: 0
NAUSEA: 1
BACK PAIN: 0
TROUBLE SWALLOWING: 0
ABDOMINAL PAIN: 0
SHORTNESS OF BREATH: 0
DIARRHEA: 0
VOMITING: 0
VOMITING: 0
CONSTIPATION: 0
NAUSEA: 1
COUGH: 0
BLOOD IN STOOL: 0
SORE THROAT: 0
PHOTOPHOBIA: 0

## 2020-12-07 ASSESSMENT — PATIENT HEALTH QUESTIONNAIRE - PHQ9
2. FEELING DOWN, DEPRESSED OR HOPELESS: 0
SUM OF ALL RESPONSES TO PHQ QUESTIONS 1-9: 0
SUM OF ALL RESPONSES TO PHQ9 QUESTIONS 1 & 2: 0
1. LITTLE INTEREST OR PLEASURE IN DOING THINGS: 0

## 2020-12-07 ASSESSMENT — PAIN SCALES - GENERAL
PAINLEVEL_OUTOF10: 4
PAINLEVEL_OUTOF10: 5
PAINLEVEL_OUTOF10: 5
PAINLEVEL_OUTOF10: 0
PAINLEVEL_OUTOF10: 5

## 2020-12-07 ASSESSMENT — PAIN DESCRIPTION - PROGRESSION
CLINICAL_PROGRESSION: GRADUALLY WORSENING
CLINICAL_PROGRESSION: GRADUALLY WORSENING

## 2020-12-07 ASSESSMENT — PAIN DESCRIPTION - ONSET
ONSET: ON-GOING
ONSET: ON-GOING

## 2020-12-07 ASSESSMENT — PAIN DESCRIPTION - PAIN TYPE
TYPE: ACUTE PAIN

## 2020-12-07 ASSESSMENT — PAIN DESCRIPTION - ORIENTATION
ORIENTATION: LEFT
ORIENTATION: LEFT

## 2020-12-07 ASSESSMENT — PAIN DESCRIPTION - LOCATION
LOCATION: ARM
LOCATION: ARM

## 2020-12-07 ASSESSMENT — PAIN - FUNCTIONAL ASSESSMENT
PAIN_FUNCTIONAL_ASSESSMENT: ACTIVITIES ARE NOT PREVENTED
PAIN_FUNCTIONAL_ASSESSMENT: ACTIVITIES ARE NOT PREVENTED

## 2020-12-07 ASSESSMENT — PAIN DESCRIPTION - DESCRIPTORS
DESCRIPTORS: ACHING;DISCOMFORT;DULL
DESCRIPTORS: ACHING;DISCOMFORT;DULL

## 2020-12-07 ASSESSMENT — PAIN DESCRIPTION - FREQUENCY
FREQUENCY: CONTINUOUS
FREQUENCY: CONTINUOUS

## 2020-12-07 NOTE — PROGRESS NOTES
2020    TELEHEALTH EVALUATION -- Audio/Visual (During QZMBO-42 public health emergency)    HPI:    Britany Tamayo (:  1950) has requested an audio/video evaluation for the following concern(s):    Cyst/Lesion on underarm: Noticed 3 days ago, not draining. Red around the lesion. Having fevers and chills that started after noticing the lesion. High of 103. 101 currently with Tylenol this AM around 630. Does not shave her underarms regularly, last time was this summer. Feels sore to move arm. She states the cyst feels approximately 1 cm in size. Review of Systems   Constitutional: Positive for chills and fever. Respiratory: Negative for shortness of breath. Cardiovascular: Negative for chest pain. Gastrointestinal: Positive for nausea. Negative for vomiting. Skin: Positive for rash. Negative for wound. Neurological: Negative for light-headedness. Psychiatric/Behavioral: Negative for agitation and confusion. Prior to Visit Medications    Medication Sig Taking?  Authorizing Provider   levothyroxine (SYNTHROID) 112 MCG tablet Take 1 tablet by mouth daily Yes Nya Tomlin DO   atorvastatin (LIPITOR) 40 MG tablet Take 1 tablet by mouth nightly Yes Nya Tomlin DO   apixaban (ELIQUIS) 5 MG TABS tablet Take 1 tablet by mouth 2 times daily Yes Adriana Downey MD   metoprolol succinate (TOPROL XL) 50 MG extended release tablet Take 1 tablet by mouth daily Yes Adriana Downey MD   polyethyl glycol-propyl glycol 0.4-0.3 % (SYSTANE) 0.4-0.3 % ophthalmic solution 2 drops as needed for Dry Eyes Yes Historical Provider, MD   Multiple Vitamins-Minerals (CENTRUM SILVER ULTRA WOMENS PO) Take by mouth Yes Historical Provider, MD   Calcium-Magnesium-Vitamin D (CALCIUM 1200+D3 PO) Take by mouth Yes Historical Provider, MD   Cholecalciferol (VITAMIN D3) 5000 units TABS Take by mouth daily  Yes Historical Provider, MD   Omega-3 Fatty Acids (FISH OIL TRIPLE STRENGTH) 1400 MG CAPS Take by mouth Yes Historical Provider, MD       Social History     Tobacco Use    Smoking status: Never Smoker    Smokeless tobacco: Never Used   Substance Use Topics    Alcohol use: No    Drug use: No          PHYSICAL EXAMINATION:  [ INSTRUCTIONS:  \"[x]\" Indicates a positive item  \"[]\" Indicates a negative item  -- DELETE ALL ITEMS NOT EXAMINED]  Vital Signs: (As obtained by patient/caregiver or practitioner observation)      Constitutional: [x] Appears well-developed and well-nourished [] No apparent distress      [] Abnormal-   Mental status  [x] Alert and awake  [x] Oriented to person/place/time [x]Able to follow commands      Eyes:  EOM    [x]  Normal  [] Abnormal-  Sclera  [x]  Normal  [] Abnormal -         Discharge [x]  None visible  [] Abnormal -    HENT:   [x] Normocephalic, atraumatic. [] Abnormal     External Ears [x] Normal  [] Abnormal-     Neck: [x] No visualized mass     Pulmonary/Chest: [x] Respiratory effort normal.  [] No visualized signs of difficulty breathing or respiratory distress        [] Abnormal-      Musculoskeletal:   [x] Normal gait with no signs of ataxia         [x] Normal range of motion of neck        [] Abnormal-       Neurological:        [x] No Facial Asymmetry (Cranial nerve 7 motor function) (limited exam to video visit)          [x] No gaze palsy        [] Abnormal-         Skin:        [x] No significant exanthematous lesions or discoloration noted on facial skin         [] Abnormal-  Lesion: Under left arm. Dark spot in the center with some mild redness surrounding it spread to approximately the entire axilla. Color and true spread limited by video eval. Unable to identify fluctuance. Does not appear to be actively draining. Psychiatric:       [x] Normal Affect [x] No Hallucinations        [] Abnormal-     Other pertinent observable physical exam findings-     ASSESSMENT/PLAN:  1.  Abscess  Possible abscess with systemic spread of infection   Recommended to go to the ER for potential drainage  Pt is on Eliquis so office drainage would be inappropriate  May need IV Abx if patient shows signs of sepsis      Return for Go to ER. Cricket Smith is a 78 y.o. female being evaluated by a Virtual Visit (video visit) encounter to address concerns as mentioned above. A caregiver was present when appropriate. Due to this being a TeleHealth encounter (During CRB-13 public health emergency), evaluation of the following organ systems was limited: Vitals/Constitutional/EENT/Resp/CV/GI//MS/Neuro/Skin/Heme-Lymph-Imm. Pursuant to the emergency declaration under the [address] [place] [address], [address] [place] [address] authority and the [email or web address] and [employer] Act, this Virtual Visit was conducted with patient's (and/or legal guardian's) consent, to reduce the patient's risk of exposure to COVID-19 and provide necessary medical care. The patient (and/or legal guardian) has also been advised to contact this office for worsening conditions or problems, and seek emergency medical treatment and/or call 911 if deemed necessary. Patient identification was verified at the start of the visit: Yes    Total time spent on this encounter: Not billed by time    Services were provided through a video synchronous discussion virtually to substitute for in-person clinic visit. Patient and provider were located at their individual homes. --Mario Garcia MD on 12/7/2020 at 4:14 PM    An electronic signature was used to authenticate this note.

## 2020-12-07 NOTE — TELEPHONE ENCOUNTER
I'm thinking the fever is due to an infection from the boil/ingrown hair. But there is no way to be sure. I'm not in the office today. Go ahead and schedule her with a resident physician for a virtual visit, and we will go from there. Thanks!
If she is not able to come to the office as she has a fever, I recommend Urgent Care or [address], as this needs evaluated in person. Thanks!
Noted. I would advise her to tell the flu clinic about the armpit as this could be a source of her fever. Thanks!
Noted. Thank you!
Patient called has painful boil or ingrown hair under her armpit. However, she has been running a fever. At Highest, 103. It does come down with Tylenol. Can I schedule a virtual visit with someone for this problem? Please advise as soon as possible. Thank you.
Scheduled for VV with Dr Floyd Click today at 1:00 pm.
She needs an in-office visit as it may need to be drained. Thanks!
normal...

## 2020-12-07 NOTE — H&P
[place] [id number]  Resident History and Physical      CHIEF COMPLAINT:    Chief Complaint   Patient presents with    Abscess     left arm pit, sent over by Dr Nader Henson Fever        History of Present Illness:   Bhanu Vegas  is a 78 y.o. female patient of Psychiatric, DO  with a pertinent PMHx of A. fib, hyperlipidemia, hypothyroidism, JESSICA who presented to the ER from home with chief complaint of left armpit swelling and fever for 3 days. She was seen at her PCPs office via video visit with myself and was recommended to come to the emergency room due to the systemic symptoms. She noticed the armpit lesion when she had discomfort putting on her bra approximately 3 days ago. The redness and pain has increased over the last 3 days and now is firm and warm with worsening discomfort. It is in the axilla but does radiate around to the back. Over the past 24 hours she has had chills as well as fever up to 103. She has been taking Tylenol and this has reduced the fever only a small amount still had a fever of 101 this afternoon. She also has some associated nausea but with no vomiting. She also noticed her heartbeat has been irregular over the past day or so. She is in A. fib occasionally but is not in permanent A. fib. She did not take her Eliquis this morning nor did she take any of her other medications. She has had decreased appetite as well as drink less water over the past day or so. She denies chest pain or shortness of breath, no leg swelling. In the ER she was found to have a white count of 19. She did also have a fever in the emergency room and was in A. fib with some tachycardia. Other labs unremarkable with normal kidney function, chest x-ray negative    ROS:   Review of Systems   Constitutional: Positive for chills, fatigue and fever. HENT: Negative for sore throat and trouble swallowing. Eyes: Negative for photophobia and visual disturbance. Respiratory: Negative for cough, chest tightness and shortness of breath. Cardiovascular: Negative for chest pain and leg swelling. Gastrointestinal: Positive for nausea. Negative for abdominal pain, blood in stool, constipation, diarrhea and vomiting. Genitourinary: Negative for dysuria and hematuria. Musculoskeletal: Negative for back pain and gait problem. Skin: Positive for rash. Negative for wound. Neurological: Negative for dizziness, syncope, light-headedness, numbness and headaches. Psychiatric/Behavioral: Negative for agitation and confusion. Past Medical History:   Diagnosis Date    Hypothyroidism     Sleep apnea     On CPAP, wears nightly          Past Surgical History:   Procedure Laterality Date     SECTION      x2    COLONOSCOPY      HYSTERECTOMY, TOTAL ABDOMINAL      WISDOM TOOTH EXTRACTION         Medications Prior to Admission:    Prior to Admission medications    Medication Sig Start Date End Date Taking?  Authorizing Provider   apixaban (ELIQUIS) 5 MG TABS tablet Take 1 tablet by mouth 2 times daily 19  Yes Johnnie Molina MD   levothyroxine (SYNTHROID) 112 MCG tablet Take 1 tablet by mouth daily 20   [facility] DO N[place]   atorvastatin (LIPITOR) 40 MG tablet Take 1 tablet by mouth nightly 20   [facility] DO Nabor   metoprolol succinate (TOPROL XL) 50 MG extended release tablet Take 1 tablet by mouth daily 19   Johnnie Molina MD   polyethyl glycol-propyl glycol 0.4-0.3 % (SYSTANE) 0.4-0.3 % ophthalmic solution 2 drops as needed for Dry Eyes    Historical Provider, MD   Multiple Vitamins-Minerals (CENTRUM SILVER ULTRA WOMENS PO) Take by mouth    Historical Provider, MD   Calcium-Magnesium-Vitamin D (CALCIUM 1200+D3 PO) Take by mouth    Historical Provider, MD   Cholecalciferol (VITAMIN D3) 5000 units TABS Take by mouth daily     Historical Provider, MD   Omega-3 Fatty Acids (FISH OIL TRIPLE STRENGTH) 1400 MG CAPS Take by mouth Historical Provider, MD        Allergies:   Patient has no known allergies. Social History:    reports that she has never smoked. She has never used smokeless tobacco. She reports that she does not drink alcohol or use drugs. Family History:   family history includes Arthritis in her brother and brother; Atrial Fibrillation in her paternal grandmother; COPD in her brother and father; Cancer in her paternal grandfather; Colon Polyps in her father; Deep Vein Thrombosis in her maternal grandmother; Glaucoma in her brother; Heart Disease in her maternal grandfather; High Blood Pressure in her brother; Hypothyroidism in her brother, maternal grandmother, and paternal grandmother; Mult Sclerosis (age of onset: 48) in her father; Other in her brother; Psoriasis in her brother; Rheum Arthritis in her brother and mother; Stroke (age of onset: 35) in her mother. PHYSICAL EXAM:    Vitals:  /79   Pulse 113   Temp 101.7 °F (38.7 °C) (Oral)   Resp 16   Ht 5' 4\" (1.626 m)   Wt 250 lb (113.4 kg)   LMP  (LMP Unknown)   SpO2 96%   BMI 42.91 kg/m²     Physical Exam  Vitals signs reviewed. Constitutional:       General: She is not in acute distress. Appearance: Normal appearance. She is obese. She is not ill-appearing or toxic-appearing. HENT:      Head: Normocephalic and atraumatic. Mouth/Throat:      Mouth: Mucous membranes are dry. Pharynx: Oropharynx is clear. No oropharyngeal exudate or posterior oropharyngeal erythema. Eyes:      Conjunctiva/sclera: Conjunctivae normal.      Pupils: Pupils are equal, round, and reactive to light. Neck:      Musculoskeletal: Normal range of motion. No neck rigidity. Cardiovascular:      Rate and Rhythm: Tachycardia present. Rhythm irregular. Heart sounds: Normal heart sounds. No murmur. Pulmonary:      Effort: Pulmonary effort is normal. No respiratory distress. Breath sounds: Normal breath sounds. No wheezing.    Abdominal:      General: Bowel sounds are normal. There is no distension. Palpations: Abdomen is soft. Tenderness: There is no abdominal tenderness. Musculoskeletal: Normal range of motion. General: No swelling or tenderness. Right lower leg: No edema. Left lower leg: No edema. Skin:     General: Skin is warm and dry. Findings: Erythema, lesion and rash present. Comments: Large approximately 8 x 4 cm firm and warm erythematous cellulitic rash in left axilla, tender. No identified purulence or active drainage. No area of fluctuance   Neurological:      General: No focal deficit present. Mental Status: She is alert and oriented to person, place, and time. Motor: No weakness.          LABS:  Recent Results (from the past 24 hour(s))   CBC Auto Differential    Collection Time: 12/07/20  4:48 PM   Result Value Ref Range    WBC 19.6 (H) 4.5 - 11.5 E9/L    RBC 5.12 3.50 - 5.50 E12/L    Hemoglobin 14.6 11.5 - 15.5 g/dL    Hematocrit 45.8 34.0 - 48.0 %    MCV 89.5 80.0 - 99.9 fL    MCH 28.5 26.0 - 35.0 pg    MCHC 31.9 (L) 32.0 - 34.5 %    RDW 13.3 11.5 - 15.0 fL    Platelets 882 907 - 456 E9/L    MPV 11.5 7.0 - 12.0 fL    Neutrophils % 87.6 (H) 43.0 - 80.0 %    Immature Granulocytes % 0.4 0.0 - 5.0 %    Lymphocytes % 4.9 (L) 20.0 - 42.0 %    Monocytes % 6.8 2.0 - 12.0 %    Eosinophils % 0.0 0.0 - 6.0 %    Basophils % 0.3 0.0 - 2.0 %    Neutrophils Absolute 17.20 (H) 1.80 - 7.30 E9/L    Immature Granulocytes # 0.08 E9/L    Lymphocytes Absolute 0.97 (L) 1.50 - 4.00 E9/L    Monocytes Absolute 1.34 (H) 0.10 - 0.95 E9/L    Eosinophils Absolute 0.00 (L) 0.05 - 0.50 E9/L    Basophils Absolute 0.05 0.00 - 0.20 E9/L   Comprehensive Metabolic Panel    Collection Time: 12/07/20  4:48 PM   Result Value Ref Range    Sodium 133 132 - 146 mmol/L    Potassium 4.1 3.5 - 5.0 mmol/L    Chloride 99 98 - 107 mmol/L    CO2 23 22 - 29 mmol/L    Anion Gap 11 7 - 16 mmol/L    Glucose 132 (H) 74 - 99 mg/dL    BUN 13 8 - 23 mg/dL    CREATININE 0.8 0.5 - 1.0 mg/dL    GFR Non-African American >60 >=60 mL/min/1.73    GFR African American >60     Calcium 9.2 8.6 - 10.2 mg/dL    Total Protein 8.1 6.4 - 8.3 g/dL    Alb 4.1 3.5 - 5.2 g/dL    Total Bilirubin 1.1 0.0 - 1.2 mg/dL    Alkaline Phosphatase 106 (H) 35 - 104 U/L    ALT 23 0 - 32 U/L    AST 19 0 - 31 U/L   Lactic Acid, Plasma    Collection Time: 12/07/20  4:48 PM   Result Value Ref Range    Lactic Acid 1.4 0.5 - 2.2 mmol/L   Urinalysis    Collection Time: 12/07/20  4:48 PM   Result Value Ref Range    Color, UA Yellow Straw/Yellow    Clarity, UA Clear Clear    Glucose, Ur Negative Negative mg/dL    Bilirubin Urine Negative Negative    Ketones, Urine 40 (A) Negative mg/dL    Specific Gravity, UA 1.020 1.005 - 1.030    Blood, Urine TRACE-LYSED Negative    pH, UA 7.0 5.0 - 9.0    Protein, UA 30 (A) Negative mg/dL    Urobilinogen, Urine 1.0 <2.0 E.U./dL    Nitrite, Urine Negative Negative    Leukocyte Esterase, Urine Negative Negative   Microscopic Urinalysis    Collection Time: 12/07/20  4:48 PM   Result Value Ref Range    WBC, UA 1-3 0 - 5 /HPF    RBC, UA 1-3 0 - 2 /HPF    Epithelial Cells, UA FEW /HPF    Bacteria, UA MODERATE (A) None Seen /HPF       XR CHEST PORTABLE   Final Result   No acute cardiopulmonary abnormality. ASSESSMENT/PLAN:      Active Hospital Problems    Diagnosis Date Noted    Cellulitis [L03.90] 12/07/2020    Paroxysmal atrial fibrillation (HCC) [I48.0] 02/13/2019     Left axilla cellulitis  Likely would benefit from observation and IV antibiotics due to the large size and systemic symptoms. Unknown source of colonization as patient does not shave regularly. No drainable area of fluctuance identified.   Leukocytosis likely secondary to this cellulitic infection  -Blood cultures drawn in ER  -Urine culture drawn and sent from ER urinalysis relatively unremarkable  -Received 2 g Ancef in the ER, will continue 2 g Ancef every 8 hours  -Add doxycycline 100 p.o. every 12h  -Monitor closely for signs of sepsis  -We will continue IV normal saline until the morning due to decreased oral intake  -Telemetry monitoring    Paroxysmal A. Fib  Patient is currently in A. fib likely secondary to the infection  -Admit to observation with telemetry  -Obtain EKG  -Continue Eliquis 5 mg twice daily  -Continue Toprol 50 mg daily, may need to increase for short time if poor rate control secondary to infection    Home Meds:  Continue Lipitor  Continue Synthroid      DVT ppx: Eliquis  Code Status: Full        Case discussed with attending on call Dr. Leonor Hernandez.     Alston Schilder MD  Family Medicine Resident Physician   12/7/2020

## 2020-12-07 NOTE — PROGRESS NOTES
TeleMedicine Patient Consent    This visit was performed as a virtual video visit using a synchronous, two-way, audio-video telehealth technology platform. Patient identification was verified at the start of the visit, including the patient's telephone number and physical location. I discussed with the patient the nature of our telehealth visits, that:     1. Due to the nature of an audio- video modality, the only components of a physical exam that could be done are the elements supported by direct observation. 2. The provider will evaluate the patient and recommend diagnostics and treatments based on their assessment. 3. If it was felt that the patient should be evaluated in clinic or an emergency room setting, then they would be directed there. 4. Our sessions are not being recorded and that personal health information is protected. 5. Our team would provide follow up care in person if/when the patient needs it. Patient does agree to proceed with telemedicine consultation. [facility]    Physician location: regular office location Other people involved in call: Preceptor  This visit was completed virtually using Doxy. me    This visit was performed during the 0736 public health crisis and COVID-19 pandemic.   *Add GT modifier to all Video Visits*

## 2020-12-07 NOTE — PROGRESS NOTES
[facility] Precepting Note    Subjective: This Telehealth visit was performed as two-way, audio-video technology platform. Verbal consent was taken from patient as noted in resident's chart. Pt has a boil under her left arm pit that she noticed about 3 days ago. She started to have fevers yesterday with chills and nausea. The area is about 1 cm. She does not shave under the arm. Objective:  As noted in resident's chart. Assessment/Plan:  1. abscess with fever - to the ER. Attending Physician Statement  I have reviewed the chart, including any radiology or labs. I have discussed the case, including pertinent history with the resident. I agree with the assessment, plan and orders as documented by the resident. Please refer to the resident note for additional information.     Electronically signed by Danay Velez MD on 12/7/20 at 1:22 PM EST

## 2020-12-08 ENCOUNTER — APPOINTMENT (OUTPATIENT)
Dept: ULTRASOUND IMAGING | Age: 70
DRG: 872 | End: 2020-12-08
Payer: MEDICARE

## 2020-12-08 LAB
ALBUMIN SERPL-MCNC: 3.4 G/DL (ref 3.5–5.2)
ALP BLD-CCNC: 96 U/L (ref 35–104)
ALT SERPL-CCNC: 17 U/L (ref 0–32)
ANION GAP SERPL CALCULATED.3IONS-SCNC: 12 MMOL/L (ref 7–16)
AST SERPL-CCNC: 17 U/L (ref 0–31)
BASOPHILS ABSOLUTE: 0.05 E9/L (ref 0–0.2)
BASOPHILS RELATIVE PERCENT: 0.3 % (ref 0–2)
BILIRUB SERPL-MCNC: 0.9 MG/DL (ref 0–1.2)
BUN BLDV-MCNC: 13 MG/DL (ref 8–23)
CALCIUM SERPL-MCNC: 8.6 MG/DL (ref 8.6–10.2)
CHLORIDE BLD-SCNC: 101 MMOL/L (ref 98–107)
CO2: 23 MMOL/L (ref 22–29)
CREAT SERPL-MCNC: 0.9 MG/DL (ref 0.5–1)
EKG ATRIAL RATE: 90 BPM
EKG P AXIS: 56 DEGREES
EKG P-R INTERVAL: 164 MS
EKG Q-T INTERVAL: 346 MS
EKG QRS DURATION: 86 MS
EKG QTC CALCULATION (BAZETT): 423 MS
EKG R AXIS: 21 DEGREES
EKG T AXIS: 11 DEGREES
EKG VENTRICULAR RATE: 90 BPM
EOSINOPHILS ABSOLUTE: 0.01 E9/L (ref 0.05–0.5)
EOSINOPHILS RELATIVE PERCENT: 0.1 % (ref 0–6)
GFR AFRICAN AMERICAN: >60
GFR NON-AFRICAN AMERICAN: >60 ML/MIN/1.73
GLUCOSE BLD-MCNC: 130 MG/DL (ref 74–99)
HCT VFR BLD CALC: 40.8 % (ref 34–48)
HEMOGLOBIN: 12.8 G/DL (ref 11.5–15.5)
IMMATURE GRANULOCYTES #: 0.15 E9/L
IMMATURE GRANULOCYTES %: 0.8 % (ref 0–5)
LYMPHOCYTES ABSOLUTE: 0.95 E9/L (ref 1.5–4)
LYMPHOCYTES RELATIVE PERCENT: 4.9 % (ref 20–42)
MCH RBC QN AUTO: 28.8 PG (ref 26–35)
MCHC RBC AUTO-ENTMCNC: 31.4 % (ref 32–34.5)
MCV RBC AUTO: 91.7 FL (ref 80–99.9)
MONOCYTES ABSOLUTE: 1.79 E9/L (ref 0.1–0.95)
MONOCYTES RELATIVE PERCENT: 9.3 % (ref 2–12)
NEUTROPHILS ABSOLUTE: 16.36 E9/L (ref 1.8–7.3)
NEUTROPHILS RELATIVE PERCENT: 84.6 % (ref 43–80)
PDW BLD-RTO: 13.6 FL (ref 11.5–15)
PLATELET # BLD: 203 E9/L (ref 130–450)
PMV BLD AUTO: 11.2 FL (ref 7–12)
POTASSIUM REFLEX MAGNESIUM: 4.4 MMOL/L (ref 3.5–5)
RBC # BLD: 4.45 E12/L (ref 3.5–5.5)
RBC # BLD: NORMAL 10*6/UL
SODIUM BLD-SCNC: 136 MMOL/L (ref 132–146)
TOTAL PROTEIN: 7.1 G/DL (ref 6.4–8.3)
WBC # BLD: 19.3 E9/L (ref 4.5–11.5)

## 2020-12-08 PROCEDURE — 85025 COMPLETE CBC W/AUTO DIFF WBC: CPT

## 2020-12-08 PROCEDURE — 6360000002 HC RX W HCPCS: Performed by: FAMILY MEDICINE

## 2020-12-08 PROCEDURE — 80053 COMPREHEN METABOLIC PANEL: CPT

## 2020-12-08 PROCEDURE — 6370000000 HC RX 637 (ALT 250 FOR IP): Performed by: FAMILY MEDICINE

## 2020-12-08 PROCEDURE — 96361 HYDRATE IV INFUSION ADD-ON: CPT

## 2020-12-08 PROCEDURE — 93010 ELECTROCARDIOGRAM REPORT: CPT | Performed by: INTERNAL MEDICINE

## 2020-12-08 PROCEDURE — 96375 TX/PRO/DX INJ NEW DRUG ADDON: CPT

## 2020-12-08 PROCEDURE — 36415 COLL VENOUS BLD VENIPUNCTURE: CPT

## 2020-12-08 PROCEDURE — 2580000003 HC RX 258: Performed by: FAMILY MEDICINE

## 2020-12-08 PROCEDURE — 76999 ECHO EXAMINATION PROCEDURE: CPT

## 2020-12-08 PROCEDURE — G0378 HOSPITAL OBSERVATION PER HR: HCPCS

## 2020-12-08 PROCEDURE — 96376 TX/PRO/DX INJ SAME DRUG ADON: CPT

## 2020-12-08 PROCEDURE — 99219 PR INITIAL OBSERVATION CARE/DAY 50 MINUTES: CPT | Performed by: FAMILY MEDICINE

## 2020-12-08 PROCEDURE — 2580000003 HC RX 258: Performed by: STUDENT IN AN ORGANIZED HEALTH CARE EDUCATION/TRAINING PROGRAM

## 2020-12-08 RX ORDER — 0.9 % SODIUM CHLORIDE 0.9 %
1000 INTRAVENOUS SOLUTION INTRAVENOUS ONCE
Status: COMPLETED | OUTPATIENT
Start: 2020-12-08 | End: 2020-12-08

## 2020-12-08 RX ADMIN — DOXYCYCLINE HYCLATE 100 MG: 100 CAPSULE ORAL at 09:00

## 2020-12-08 RX ADMIN — METOPROLOL SUCCINATE 50 MG: 50 TABLET, EXTENDED RELEASE ORAL at 08:29

## 2020-12-08 RX ADMIN — Medication 2 G: at 02:18

## 2020-12-08 RX ADMIN — DOXYCYCLINE HYCLATE 100 MG: 100 CAPSULE ORAL at 21:48

## 2020-12-08 RX ADMIN — SODIUM CHLORIDE, PRESERVATIVE FREE 10 ML: 5 INJECTION INTRAVENOUS at 21:48

## 2020-12-08 RX ADMIN — SODIUM CHLORIDE 1000 ML: 9 INJECTION, SOLUTION INTRAVENOUS at 10:24

## 2020-12-08 RX ADMIN — Medication 2 G: at 09:00

## 2020-12-08 RX ADMIN — APIXABAN 5 MG: 5 TABLET, FILM COATED ORAL at 08:29

## 2020-12-08 RX ADMIN — ONDANSETRON 4 MG: 2 INJECTION INTRAMUSCULAR; INTRAVENOUS at 03:02

## 2020-12-08 RX ADMIN — Medication 2 G: at 17:31

## 2020-12-08 RX ADMIN — ACETAMINOPHEN 650 MG: 325 TABLET ORAL at 08:58

## 2020-12-08 RX ADMIN — SODIUM CHLORIDE, PRESERVATIVE FREE 10 ML: 5 INJECTION INTRAVENOUS at 09:00

## 2020-12-08 RX ADMIN — APIXABAN 5 MG: 5 TABLET, FILM COATED ORAL at 21:48

## 2020-12-08 RX ADMIN — ATORVASTATIN CALCIUM 40 MG: 40 TABLET, FILM COATED ORAL at 21:48

## 2020-12-08 RX ADMIN — LEVOTHYROXINE SODIUM 112 MCG: 112 TABLET ORAL at 08:58

## 2020-12-08 ASSESSMENT — ENCOUNTER SYMPTOMS
NAUSEA: 0
VOMITING: 0
COUGH: 0
ABDOMINAL PAIN: 0
SHORTNESS OF BREATH: 0
COLOR CHANGE: 1

## 2020-12-08 ASSESSMENT — PAIN SCALES - GENERAL
PAINLEVEL_OUTOF10: 4
PAINLEVEL_OUTOF10: 6

## 2020-12-08 NOTE — CONSULTS
GENERAL SURGERY  CONSULT NOTE  2020    Physician Consulted: Dr. Suhas Smallwood  Reason for Consult: r/o abscess vs nec soft tissue infection  Referring Physician: Dr. Ramos Ella Booth is a 78 y.o. female with PMH of hypothyroidism and sleep apnea presents to the hospital with complaints of left underarm erythema and swelling as well as high fevers. She states she first started to notice some discomfort in her arm on Friday evening when she was removing her bra. The pain then got worse over the weekend and the area became erythematous and warm to the touch. She also began to have very high fevers with the T-max being 103 °F.  When the fevers are not resolving on Monday morning, she decided to go to her family doctor who advised her to come to the emergency room. In the ED, lab work revealed WBC count of 19.6 and she was febrile to 101 °F.  She was then admitted and has been receiving Ancef and doxycycline for treatment of cellulitis. She denies ever having cellulitis in the past.  General surgery is consulted to rule out abscess or necrotizing skin infection. An ultrasound was done today that did not show any defined fluid collection suggestive of an abscess. She states that she already feels as if the pain and swelling is decreasing since being on antibiotics for 24 hours. Past Medical History:   Diagnosis Date    Hypothyroidism     Sleep apnea     On CPAP, wears nightly        Past Surgical History:   Procedure Laterality Date     SECTION      x2    COLONOSCOPY      HYSTERECTOMY, TOTAL ABDOMINAL      WISDOM TOOTH EXTRACTION         Medications Prior to Admission:    Prior to Admission medications    Medication Sig Start Date End Date Taking?  Authorizing Provider   levothyroxine (SYNTHROID) 112 MCG tablet Take 1 tablet by mouth daily 20  Yes Nya Tomlin,    atorvastatin (LIPITOR) 40 MG tablet Take 1 tablet by mouth nightly 20  Yes Kaden, DO   apixaban (ELIQUIS) 5 MG TABS tablet Take 1 tablet by mouth 2 times daily 12/18/19  Yes Johnnie Molina MD   metoprolol succinate (TOPROL XL) 50 MG extended release tablet Take 1 tablet by mouth daily 12/18/19  Yes Johnnie Molina MD   polyethyl glycol-propyl glycol 0.4-0.3 % (SYSTANE) 0.4-0.3 % ophthalmic solution 2 drops as needed for Dry Eyes    Historical Provider, MD   Multiple Vitamins-Minerals (CENTRUM SILVER ULTRA WOMENS PO) Take by mouth    Historical Provider, MD   Calcium-Magnesium-Vitamin D (CALCIUM 1200+D3 PO) Take by mouth    Historical Provider, MD   Cholecalciferol (VITAMIN D3) 5000 units TABS Take by mouth daily     Historical Provider, MD   Omega-3 Fatty Acids ([address]) 1400 MG CAPS Take by mouth    Historical Provider, MD       No Known Allergies    Family History   Problem Relation Age of Onset    Stroke Mother 35   [facility] Rheum Arthritis Mother     COPD Father     Colon Polyps Father     Mult Sclerosis Father 48    Hypothyroidism Brother     Rheum Arthritis Brother     Psoriasis Brother     Arthritis Brother     COPD Brother     Other Brother         Venous stasis    High Blood Pressure Brother     Arthritis Brother     Glaucoma Brother     Hypothyroidism Maternal Grandmother     Deep Vein Thrombosis Maternal Grandmother     Heart Disease Maternal Grandfather     Atrial Fibrillation Paternal Grandmother     Hypothyroidism Paternal Grandmother     Cancer Paternal Grandfather        Social History     Tobacco Use    Smoking status: Never Smoker    Smokeless tobacco: Never Used   Substance Use Topics    Alcohol use: No    Drug use: No         Review of Systems   General ROS: negative  Hematological and Lymphatic ROS: negative  Respiratory ROS: negative  Cardiovascular ROS: negative  Gastrointestinal ROS: negative  Genito-Urinary ROS: negative  Musculoskeletal ROS: negative      PHYSICAL EXAM:    Vitals:    12/08/20 1350   BP: 106/65   Pulse: 77   Resp: 16   Temp: 97.7 °F interest which is in the left axilla region where nodules and induration of the soft tissues are being clinical referred there are no demonstration of a solid, complex or cystic lesion or a cluster of lesions present. There is evidence for edema tubes appearance of the subcutaneous soft tissues. Edematous appearance of the subcutaneous soft tissues in the left axilla region without focal abnormality identified. Findings require correlation with clinical data. The differential diagnosis can include areas of localized cellulitis or lymphedema of undetermined etiology. ASSESSMENT:  78 y.o. female with cellulitis of her left upper extremity    PLAN:  -Likely cellulitis with no true area of abscess, nothing that will be amenable to incision and drainage at this time  -No signs of necrotic tissue at this time  -Continue antibiotics  -We will continue to monitor for signs of necrotizing skin infection    Discussed with Dr. Augusta Llamas.     Electronically signed by Clarissa Sofia MD on 12/8/20 at 3:32 PM EST

## 2020-12-08 NOTE — PATIENT CARE CONFERENCE
[facility] Quality Flow/Interdisciplinary Rounds Progress Note        Quality Flow Rounds held on December 8, 2020    Disciplines Attending:  Bedside Nurse, ,  and Nursing Unit Leadership    Viet Meyer was admitted on 12/7/2020  4:03 PM    Anticipated Discharge Date:  Expected Discharge Date: (N/A)    Disposition:    Richard Score:  Richard Scale Score: 20    Readmission Risk              Risk of Unplanned Readmission:        0           Discussed patient goal for the day, patient clinical progression, and barriers to discharge. The following Goal(s) of the Day/Commitment(s) have been identified:  Patient to continue with cardiac rhythm within defined limits.       Deborah Barksdale  December 8, 2020

## 2020-12-08 NOTE — PROGRESS NOTES
Sent perfect serv to Dr. Jass Wheatley for consult for cellulitis of left axilla and left chest wall.   Gearl Buerger  12/8/2020  1:54 PM

## 2020-12-08 NOTE — PROGRESS NOTES
[facility]  Progress [facility]    Chief complaint :  Chief Complaint   Patient presents with    Abscess     left arm pit, sent over by Dr Lidia Martins Fever       Subjective:    Overnight patient had difficulty sleeping and states that she had cycles of being hot and cold. Patient describes feeling slightly better today. She states that her arm has been to improve a little bit. She says that it is mildly tender. She is unsure of any wound or abrasion she had in her underarm that could have caused this. Tolerating diet. Past medical, surgical, family and social history were reviewed, non-contributory, and unchanged unless otherwise stated. Review of Systems   Constitutional: Positive for chills and fever. Respiratory: Negative for cough and shortness of breath. Cardiovascular: Negative for chest pain. Gastrointestinal: Negative for abdominal pain, nausea and vomiting. Skin: Positive for color change (redness in the left axilla). Objective:  /64   Pulse 89   Temp 98.5 °F (36.9 °C) (Oral)   Resp 18   Ht 5' 4\" (1.626 m)   Wt 250 lb (113.4 kg)   LMP  (LMP Unknown)   SpO2 96%   BMI 42.91 kg/m²     Physical Exam  Constitutional:       General: She is not in acute distress. Appearance: Normal appearance. She is not toxic-appearing. HENT:      Head: Normocephalic and atraumatic. Eyes:      Extraocular Movements: Extraocular movements intact. Conjunctiva/sclera: Conjunctivae normal.   Cardiovascular:      Rate and Rhythm: Normal rate and regular rhythm. Heart sounds: Normal heart sounds. No murmur. Pulmonary:      Effort: Pulmonary effort is normal.      Breath sounds: Normal breath sounds. Abdominal:      General: Bowel sounds are normal.      Palpations: Abdomen is soft. Tenderness: There is no abdominal tenderness. Musculoskeletal:         General: No swelling. Skin:     General: Skin is warm. Findings: Erythema present. Comments: Large 8 x 4 cm indurated, warm, and erythematous rash in left axilla and back. Tender to palpation. No purulence, drainage, or fluctuance appreciated. Neurological:      Mental Status: She is alert.          Labs:  Recent Results (from the past 24 hour(s))   CBC Auto Differential    Collection Time: 12/07/20  4:48 PM   Result Value Ref Range    WBC 19.6 (H) 4.5 - 11.5 E9/L    RBC 5.12 3.50 - 5.50 E12/L    Hemoglobin 14.6 11.5 - 15.5 g/dL    Hematocrit 45.8 34.0 - 48.0 %    MCV 89.5 80.0 - 99.9 fL    MCH 28.5 26.0 - 35.0 pg    MCHC 31.9 (L) 32.0 - 34.5 %    RDW 13.3 11.5 - 15.0 fL    Platelets 319 030 - 874 E9/L    MPV 11.5 7.0 - 12.0 fL    Neutrophils % 87.6 (H) 43.0 - 80.0 %    Immature Granulocytes % 0.4 0.0 - 5.0 %    Lymphocytes % 4.9 (L) 20.0 - 42.0 %    Monocytes % 6.8 2.0 - 12.0 %    Eosinophils % 0.0 0.0 - 6.0 %    Basophils % 0.3 0.0 - 2.0 %    Neutrophils Absolute 17.20 (H) 1.80 - 7.30 E9/L    Immature Granulocytes # 0.08 E9/L    Lymphocytes Absolute 0.97 (L) 1.50 - 4.00 E9/L    Monocytes Absolute 1.34 (H) 0.10 - 0.95 E9/L    Eosinophils Absolute 0.00 (L) 0.05 - 0.50 E9/L    Basophils Absolute 0.05 0.00 - 0.20 E9/L   Comprehensive Metabolic Panel    Collection Time: 12/07/20  4:48 PM   Result Value Ref Range    Sodium 133 132 - 146 mmol/L    Potassium 4.1 3.5 - 5.0 mmol/L    Chloride 99 98 - 107 mmol/L    CO2 23 22 - 29 mmol/L    Anion Gap 11 7 - 16 mmol/L    Glucose 132 (H) 74 - 99 mg/dL    BUN 13 8 - 23 mg/dL    CREATININE 0.8 0.5 - 1.0 mg/dL    GFR Non-African American >60 >=60 mL/min/1.73    GFR African American >60     Calcium 9.2 8.6 - 10.2 mg/dL    Total Protein 8.1 6.4 - 8.3 g/dL    Alb 4.1 3.5 - 5.2 g/dL    Total Bilirubin 1.1 0.0 - 1.2 mg/dL    Alkaline Phosphatase 106 (H) 35 - 104 U/L    ALT 23 0 - 32 U/L    AST 19 0 - 31 U/L   Lactic Acid, Plasma    Collection Time: 12/07/20  4:48 PM   Result Value Ref Range    Lactic Acid 1.4 0.5 - 2.2 mmol/L   Urinalysis    Collection Time: 12/07/20  4:48 PM   Result Value Ref Range    Color, UA Yellow Straw/Yellow    Clarity, UA Clear Clear    Glucose, Ur Negative Negative mg/dL    Bilirubin Urine Negative Negative    Ketones, Urine 40 (A) Negative mg/dL    Specific Gravity, UA 1.020 1.005 - 1.030    Blood, Urine TRACE-LYSED Negative    pH, UA 7.0 5.0 - 9.0    Protein, UA 30 (A) Negative mg/dL    Urobilinogen, Urine 1.0 <2.0 E.U./dL    Nitrite, Urine Negative Negative    Leukocyte Esterase, Urine Negative Negative   Microscopic Urinalysis    Collection Time: 12/07/20  4:48 PM   Result Value Ref Range    WBC, UA 1-3 0 - 5 /HPF    RBC, UA 1-3 0 - 2 /HPF    Epithelial Cells, UA FEW /HPF    Bacteria, UA MODERATE (A) None Seen /HPF   EKG 12 Lead    Collection Time: 12/07/20 11:19 PM   Result Value Ref Range    Ventricular Rate 90 BPM    Atrial Rate 90 BPM    P-R Interval 164 ms    QRS Duration 86 ms    Q-T Interval 346 ms    QTc Calculation (Bazett) 423 ms    P Axis 56 degrees    R Axis 21 degrees    T Axis 11 degrees   Comprehensive Metabolic Panel w/ Reflex to MG    Collection Time: 12/08/20  4:05 AM   Result Value Ref Range    Sodium 136 132 - 146 mmol/L    Potassium reflex Magnesium 4.4 3.5 - 5.0 mmol/L    Chloride 101 98 - 107 mmol/L    CO2 23 22 - 29 mmol/L    Anion Gap 12 7 - 16 mmol/L    Glucose 130 (H) 74 - 99 mg/dL    BUN 13 8 - 23 mg/dL    CREATININE 0.9 0.5 - 1.0 mg/dL    GFR Non-African American >60 >=60 mL/min/1.73    GFR African American >60     Calcium 8.6 8.6 - 10.2 mg/dL    Total Protein 7.1 6.4 - 8.3 g/dL    Alb 3.4 (L) 3.5 - 5.2 g/dL    Total Bilirubin 0.9 0.0 - 1.2 mg/dL    Alkaline Phosphatase 96 35 - 104 U/L    ALT 17 0 - 32 U/L    AST 17 0 - 31 U/L   CBC auto differential    Collection Time: 12/08/20  4:05 AM   Result Value Ref Range    WBC 19.3 (H) 4.5 - 11.5 E9/L    RBC 4.45 3.50 - 5.50 E12/L    Hemoglobin 12.8 11.5 - 15.5 g/dL    Hematocrit 40.8 34.0 - 48.0 %    MCV 91.7 80.0 - 99.9 fL    MCH 28.8 26.0 - 35.0 pg    MCHC 31.4 (L) 32.0 - 34.5 %    RDW 13.6 11.5 - 15.0 fL    Platelets 708 570 - 736 E9/L    MPV 11.2 7.0 - 12.0 fL    Neutrophils % 84.6 (H) 43.0 - 80.0 %    Immature Granulocytes % 0.8 0.0 - 5.0 %    Lymphocytes % 4.9 (L) 20.0 - 42.0 %    Monocytes % 9.3 2.0 - 12.0 %    Eosinophils % 0.1 0.0 - 6.0 %    Basophils % 0.3 0.0 - 2.0 %    Neutrophils Absolute 16.36 (H) 1.80 - 7.30 E9/L    Immature Granulocytes # 0.15 E9/L    Lymphocytes Absolute 0.95 (L) 1.50 - 4.00 E9/L    Monocytes Absolute 1.79 (H) 0.10 - 0.95 E9/L    Eosinophils Absolute 0.01 (L) 0.05 - 0.50 E9/L    Basophils Absolute 0.05 0.00 - 0.20 E9/L    RBC Morphology Normal        Radiology and other tests reviewed:  XR CHEST PORTABLE   Final Result   No acute cardiopulmonary abnormality. Assessment:  Active Hospital Problems    Diagnosis Date Noted    Cellulitis [L03.90] 12/07/2020    [facility] [I48.0] 02/13/2019       Plan:  Left axilla cellulitis   -Leukocytosis  -Unknown source of colonization  -Continue 2 g Ancef every 8 hours, day 2  -Continue doxycycline 100mg every 12h  -Blood cultures pending  -Monitor closely for signs of sepsis  -On IV maintenance fluids     Paroxysmal A.  Fib  -Currently in NSR  -EKG: Normal sinus rhythm  -Continue Eliquis 5 mg BID  -Continue Toprol 50 mg daily  -Telemetry monitoring     Home Meds:  Continue Lipitor  Continue Synthroid        DVT ppx: Eliquis  Code Status: Full    Nicolette Qiu DO  Family Medicine Resident PGY-1  12/08/20   7:31 AM

## 2020-12-08 NOTE — CARE COORDINATION
Spoke with pt by phone re; d/c planning. Pt lives independently at home with spouse. No prior HHC hx.pt currently on IV/po atb's. await final cultures. Plan is to d/c home. Will monitor status of atb's required at d/c . Denies any other needs. Delma Gaming.

## 2020-12-08 NOTE — PLAN OF CARE
Problem: Pain:  Goal: Pain level will decrease  Description: Pain level will decrease  12/8/2020 0208 by Bryan Mason  Outcome: Met This Shift  12/8/2020 0206 by Bryan Mason  Outcome: Met This Shift  Goal: Control of acute pain  Description: Control of acute pain  12/8/2020 0208 by Bryan Mason  Outcome: Met This Shift  12/8/2020 0206 by Bryan Mason  Outcome: Met This Shift     Problem: Falls - Risk of:  Goal: Will remain free from falls  Description: Will remain free from falls  Outcome: Met This Shift  Goal: Absence of physical injury  Description: Absence of physical injury  Outcome: Met This Shift

## 2020-12-08 NOTE — ED PROVIDER NOTES
HPI:     Britany Tamayo is a 78 y.o. female presenting to the ED for concern for abscess, beginning 24 hours ago. The complaint has been persistent, moderate in severity, and worsened by nothing. Patient states that she noticed yesterday that a portion of her left axilla felt \"off\" when her bra rubs against it. States that when she got home she noticed that it was red, tender to palpation, swollen. States that she then broke out into a fever. States that she went to see Dr. Brittani Bravo earlier today and he referred her to the ER for further evaluation. She states that it is very tender to palpation in the left axillary area. Except for the fever, she denies any other symptoms including headache, dizziness, chest pain, cough, nausea, vomiting, abdominal pain, diarrhea, constipation, urinary symptoms. Review of Systems:   Please see HPI above. All bolded are positive. All un-bolded are negative. Constitutional Symptoms: fever, chills, fatigue, generalized weakness, diaphoresis, increase in thirst, loss of appetite  Eyes: vision change   Ears, Nose, Mouth, Throat: hearing loss, nasal congestion, sores in the mouth  Cardiovascular: chest pain, chest heaviness, palpitations  Respiratory: shortness of breath, wheezing, coughing  Gastrointestinal: abdominal pain, nausea, vomiting, diarrhea, constipation, melena, hematochezia, hematemesis  Genitourinary: dysuria, hematuria, increased frequency  Musculoskeletal: lower extremity edema, myalgias, arthralgias, back pain  Integumentary: rashes, abscess, itching   Neurological: headache, lightheadedness, dizziness, confusion, syncope, numbness, tingling, focal weakness  Psychiatric: depression, suicidal ideation, anxiety        --------------------------------------------- PAST HISTORY ---------------------------------------------  Past Medical History:  has a past medical history of Hypothyroidism and Sleep apnea.     Past Surgical History:  has a past surgical history that includes Hysterectomy, total abdominal;  section; [place] tooth extraction; and Colonoscopy. Social History:  reports that she has never smoked. She has never used smokeless tobacco. She reports that she does not drink alcohol or use drugs. Family History: family history includes Arthritis in her brother and brother; Atrial Fibrillation in her paternal grandmother; COPD in her brother and father; Cancer in her paternal grandfather; Colon Polyps in her father; Deep Vein Thrombosis in her maternal grandmother; Glaucoma in her brother; Heart Disease in her maternal grandfather; High Blood Pressure in her brother; Hypothyroidism in her brother, maternal grandmother, and paternal grandmother; Mult Sclerosis (age of onset: 48) in her father; Other in her brother; Psoriasis in her brother; Rheum Arthritis in her brother and mother; Stroke (age of onset: 35) in her mother. The patients home medications have been reviewed. Allergies: Patient has no known allergies.     -------------------------------------------------- RESULTS -------------------------------------------------  All laboratory and radiology results have been personally reviewed by myself   LABS:  Results for orders placed or performed during the hospital encounter of 20   CBC Auto Differential   Result Value Ref Range    WBC 19.6 (H) 4.5 - 11.5 E9/L    RBC 5.12 3.50 - 5.50 E12/L    Hemoglobin 14.6 11.5 - 15.5 g/dL    Hematocrit 45.8 34.0 - 48.0 %    MCV 89.5 80.0 - 99.9 fL    MCH 28.5 26.0 - 35.0 pg    MCHC 31.9 (L) 32.0 - 34.5 %    RDW 13.3 11.5 - 15.0 fL    Platelets 933 094 - 084 E9/L    MPV 11.5 7.0 - 12.0 fL    Neutrophils % 87.6 (H) 43.0 - 80.0 %    Immature Granulocytes % 0.4 0.0 - 5.0 %    Lymphocytes % 4.9 (L) 20.0 - 42.0 %    Monocytes % 6.8 2.0 - 12.0 %    Eosinophils % 0.0 0.0 - 6.0 %    Basophils % 0.3 0.0 - 2.0 %    Neutrophils Absolute 17.20 (H) 1.80 - 7.30 E9/L    Immature Granulocytes # 0.08 E9/L    Lymphocytes Absolute 0.97 (L) 1.50 - 4.00 E9/L    Monocytes Absolute 1.34 (H) 0.10 - 0.95 E9/L    Eosinophils Absolute 0.00 (L) 0.05 - 0.50 E9/L    Basophils Absolute 0.05 0.00 - 0.20 E9/L   Comprehensive Metabolic Panel   Result Value Ref Range    Sodium 133 132 - 146 mmol/L    Potassium 4.1 3.5 - 5.0 mmol/L    Chloride 99 98 - 107 mmol/L    CO2 23 22 - 29 mmol/L    Anion Gap 11 7 - 16 mmol/L    Glucose 132 (H) 74 - 99 mg/dL    BUN 13 8 - 23 mg/dL    CREATININE 0.8 0.5 - 1.0 mg/dL    GFR Non-African American >60 >=60 mL/min/1.73    GFR African American >60     Calcium 9.2 8.6 - 10.2 mg/dL    Total Protein 8.1 6.4 - 8.3 g/dL    Alb 4.1 3.5 - 5.2 g/dL    Total Bilirubin 1.1 0.0 - 1.2 mg/dL    Alkaline Phosphatase 106 (H) 35 - 104 U/L    ALT 23 0 - 32 U/L    AST 19 0 - 31 U/L   Lactic Acid, Plasma   Result Value Ref Range    Lactic Acid 1.4 0.5 - 2.2 mmol/L   Urinalysis   Result Value Ref Range    Color, UA Yellow Straw/Yellow    Clarity, UA Clear Clear    Glucose, Ur Negative Negative mg/dL    Bilirubin Urine Negative Negative    Ketones, Urine 40 (A) Negative mg/dL    Specific Gravity, UA 1.020 1.005 - 1.030    Blood, Urine TRACE-LYSED Negative    pH, UA 7.0 5.0 - 9.0    Protein, UA 30 (A) Negative mg/dL    Urobilinogen, Urine 1.0 <2.0 E.U./dL    Nitrite, Urine Negative Negative    Leukocyte Esterase, Urine Negative Negative   Microscopic Urinalysis   Result Value Ref Range    WBC, UA 1-3 0 - 5 /HPF    RBC, UA 1-3 0 - 2 /HPF    Epithelial Cells, UA FEW /HPF    Bacteria, UA MODERATE (A) None Seen /HPF       RADIOLOGY:  Interpreted by Radiologist.  XR CHEST PORTABLE   Final Result   No acute cardiopulmonary abnormality.          ------------------------- NURSING NOTES AND VITALS REVIEWED ---------------------------   The nursing notes within the ED encounter and vital signs as below have been reviewed.    /68   Pulse 112   Temp 98.5 °F (36.9 °C) (Oral)   Resp 16   Ht 5' 4\" (1.626 m)   Wt 250 lb (113.4 kg)   LMP  (LMP Unknown)   SpO2 96%   BMI 42.91 kg/m²   Oxygen Saturation Interpretation: Normal      ---------------------------------------------------PHYSICAL EXAM--------------------------------------      Constitutional/General: Alert and oriented x3, well appearing, non toxic in NAD  Head: Normocephalic and atraumatic  Eyes: PERRL, EOMI  Mouth: Oropharynx clear, handling secretions, no trismus  Neck: Supple, full ROM,   Pulmonary: Lungs clear to auscultation bilaterally, no wheezes, rales, or rhonchi. Not in respiratory distress  Cardiovascular: Tachycardic with regular rhythm, no murmurs, gallops, or rubs. 2+ distal pulses  Abdomen: Soft, non tender, non distended,   Extremities: Moves all extremities x 4. Warm and well perfused  Skin: warm and dry, there is a large area of erythema, induration, warmth, and tenderness to palpation noted to left axilla. Please see attached photo. No fluctuance noted. Neurologic: GCS 15,  Psych: Normal Affect          Procedures:    MUSCULOSKELETAL BEDSIDE ULTRASOUND     Risks, benefits and alternatives (for applicable procedures below) described. Performed By: Nehal Bush DO. Indication: Erythema, induration, concern for abscess  Informed consent: The patient provided verbal consent for this procedure. .  Procedural Quadrants:     REGION: Ultrasound of left axilla was obtained which demonstrates no loculation of fluid collection that is amenable to drainage. There is however significant cobblestoning noted consistent with cellulitis.   Please see attached photo            ------------------------------ ED COURSE/MEDICAL DECISION MAKING----------------------  Medications   0.9 % sodium chloride bolus (0 mLs Intravenous Stopped 12/7/20 1950)   acetaminophen (TYLENOL) tablet 1,000 mg (1,000 mg Oral Given 12/7/20 1731)   ceFAZolin (ANCEF) 2 g in sterile water 20 mL IV syringe (2 g Intravenous Given 12/7/20 8957)         ED COURSE:       Medical Decision Making:    Patient presented to the ER today with chief complaint of left axillary cellulitis. Patient does indeed appear to be cellulitic in that area. No abscess noted when ultrasound was placed on the left axilla. Patient does have an elevated white count and is tachycardic. It appears that she has sepsis. Patient given 2 g Ancef here in the ER. I did speak to Dr. Twila Azevedo who will admit patient. Patient is agreeable to this plan as well. All questions been answered. Counseling: The emergency provider has spoken with the patient and discussed todays results, in addition to providing specific details for the plan of care and counseling regarding the diagnosis and prognosis. Questions are answered at this time and they are agreeable with the plan.      --------------------------------- IMPRESSION AND DISPOSITION ---------------------------------    IMPRESSION  1. Sepsis due to cellulitis ([facility] [address]        DISPOSITION  Disposition: Admit to telemetry  Patient condition is stable      NOTE: This report was transcribed using voice recognition software.  Every effort was made to ensure accuracy; however, inadvertent computerized transcription errors may be present       DO Christ Corrales  12/07/20 6828

## 2020-12-09 ENCOUNTER — APPOINTMENT (OUTPATIENT)
Dept: CT IMAGING | Age: 70
DRG: 872 | End: 2020-12-09
Payer: MEDICARE

## 2020-12-09 LAB
ALBUMIN SERPL-MCNC: 3.2 G/DL (ref 3.5–5.2)
ALP BLD-CCNC: 102 U/L (ref 35–104)
ALT SERPL-CCNC: 10 U/L (ref 0–32)
ANION GAP SERPL CALCULATED.3IONS-SCNC: 10 MMOL/L (ref 7–16)
AST SERPL-CCNC: 22 U/L (ref 0–31)
BASOPHILS ABSOLUTE: 0.04 E9/L (ref 0–0.2)
BASOPHILS RELATIVE PERCENT: 0.2 % (ref 0–2)
BILIRUB SERPL-MCNC: 0.5 MG/DL (ref 0–1.2)
BUN BLDV-MCNC: 20 MG/DL (ref 8–23)
CALCIUM SERPL-MCNC: 8.6 MG/DL (ref 8.6–10.2)
CHLORIDE BLD-SCNC: 105 MMOL/L (ref 98–107)
CO2: 21 MMOL/L (ref 22–29)
CREAT SERPL-MCNC: 0.9 MG/DL (ref 0.5–1)
EOSINOPHILS ABSOLUTE: 0.49 E9/L (ref 0.05–0.5)
EOSINOPHILS RELATIVE PERCENT: 2.6 % (ref 0–6)
GFR AFRICAN AMERICAN: >60
GFR NON-AFRICAN AMERICAN: >60 ML/MIN/1.73
GLUCOSE BLD-MCNC: 111 MG/DL (ref 74–99)
HCT VFR BLD CALC: 37.2 % (ref 34–48)
HEMOGLOBIN: 12 G/DL (ref 11.5–15.5)
IMMATURE GRANULOCYTES #: 0.1 E9/L
IMMATURE GRANULOCYTES %: 0.5 % (ref 0–5)
LYMPHOCYTES ABSOLUTE: 2.06 E9/L (ref 1.5–4)
LYMPHOCYTES RELATIVE PERCENT: 10.8 % (ref 20–42)
MCH RBC QN AUTO: 29.1 PG (ref 26–35)
MCHC RBC AUTO-ENTMCNC: 32.3 % (ref 32–34.5)
MCV RBC AUTO: 90.3 FL (ref 80–99.9)
MONOCYTES ABSOLUTE: 1.31 E9/L (ref 0.1–0.95)
MONOCYTES RELATIVE PERCENT: 6.9 % (ref 2–12)
NEUTROPHILS ABSOLUTE: 15.04 E9/L (ref 1.8–7.3)
NEUTROPHILS RELATIVE PERCENT: 79 % (ref 43–80)
ORGANISM: ABNORMAL
PDW BLD-RTO: 13.7 FL (ref 11.5–15)
PLATELET # BLD: 210 E9/L (ref 130–450)
PMV BLD AUTO: 11.2 FL (ref 7–12)
POTASSIUM REFLEX MAGNESIUM: 4 MMOL/L (ref 3.5–5)
RBC # BLD: 4.12 E12/L (ref 3.5–5.5)
SODIUM BLD-SCNC: 136 MMOL/L (ref 132–146)
TOTAL PROTEIN: 6.8 G/DL (ref 6.4–8.3)
URINE CULTURE, ROUTINE: ABNORMAL
WBC # BLD: 19 E9/L (ref 4.5–11.5)

## 2020-12-09 PROCEDURE — 2580000003 HC RX 258: Performed by: FAMILY MEDICINE

## 2020-12-09 PROCEDURE — 6360000002 HC RX W HCPCS: Performed by: FAMILY MEDICINE

## 2020-12-09 PROCEDURE — 99222 1ST HOSP IP/OBS MODERATE 55: CPT | Performed by: SURGERY

## 2020-12-09 PROCEDURE — 85025 COMPLETE CBC W/AUTO DIFF WBC: CPT

## 2020-12-09 PROCEDURE — 6370000000 HC RX 637 (ALT 250 FOR IP): Performed by: FAMILY MEDICINE

## 2020-12-09 PROCEDURE — 36415 COLL VENOUS BLD VENIPUNCTURE: CPT

## 2020-12-09 PROCEDURE — 71260 CT THORAX DX C+: CPT

## 2020-12-09 PROCEDURE — 80053 COMPREHEN METABOLIC PANEL: CPT

## 2020-12-09 PROCEDURE — 6360000004 HC RX CONTRAST MEDICATION: Performed by: RADIOLOGY

## 2020-12-09 PROCEDURE — 96376 TX/PRO/DX INJ SAME DRUG ADON: CPT

## 2020-12-09 PROCEDURE — 2060000000 HC ICU INTERMEDIATE R&B

## 2020-12-09 PROCEDURE — 99232 SBSQ HOSP IP/OBS MODERATE 35: CPT | Performed by: FAMILY MEDICINE

## 2020-12-09 RX ORDER — METOPROLOL SUCCINATE 25 MG/1
25 TABLET, EXTENDED RELEASE ORAL NIGHTLY
Status: DISCONTINUED | OUTPATIENT
Start: 2020-12-09 | End: 2020-12-10 | Stop reason: HOSPADM

## 2020-12-09 RX ADMIN — SODIUM CHLORIDE, PRESERVATIVE FREE 10 ML: 5 INJECTION INTRAVENOUS at 21:20

## 2020-12-09 RX ADMIN — Medication 2 G: at 08:27

## 2020-12-09 RX ADMIN — APIXABAN 5 MG: 5 TABLET, FILM COATED ORAL at 21:16

## 2020-12-09 RX ADMIN — LEVOTHYROXINE SODIUM 112 MCG: 112 TABLET ORAL at 05:58

## 2020-12-09 RX ADMIN — APIXABAN 5 MG: 5 TABLET, FILM COATED ORAL at 08:27

## 2020-12-09 RX ADMIN — METOPROLOL SUCCINATE 50 MG: 50 TABLET, EXTENDED RELEASE ORAL at 08:27

## 2020-12-09 RX ADMIN — METOPROLOL SUCCINATE 25 MG: 25 TABLET, EXTENDED RELEASE ORAL at 21:16

## 2020-12-09 RX ADMIN — Medication 2 G: at 01:27

## 2020-12-09 RX ADMIN — SODIUM CHLORIDE, PRESERVATIVE FREE 10 ML: 5 INJECTION INTRAVENOUS at 08:33

## 2020-12-09 RX ADMIN — IOPAMIDOL 75 ML: 755 INJECTION, SOLUTION INTRAVENOUS at 15:00

## 2020-12-09 RX ADMIN — DOXYCYCLINE HYCLATE 100 MG: 100 CAPSULE ORAL at 08:27

## 2020-12-09 RX ADMIN — Medication 2 G: at 18:45

## 2020-12-09 RX ADMIN — ATORVASTATIN CALCIUM 40 MG: 40 TABLET, FILM COATED ORAL at 21:16

## 2020-12-09 RX ADMIN — DOXYCYCLINE HYCLATE 100 MG: 100 CAPSULE ORAL at 21:15

## 2020-12-09 ASSESSMENT — ENCOUNTER SYMPTOMS
COLOR CHANGE: 1
SHORTNESS OF BREATH: 0
COUGH: 0
VOMITING: 0
NAUSEA: 0
ABDOMINAL PAIN: 0

## 2020-12-09 ASSESSMENT — PAIN SCALES - GENERAL
PAINLEVEL_OUTOF10: 0
PAINLEVEL_OUTOF10: 0

## 2020-12-09 NOTE — PROGRESS NOTES
GENERAL SURGERY  DAILY PROGRESS NOTE  12/9/2020    Subjective:  Pt feeling okay this morning. Still with discomfort and a feeling of swelling under her left arm. However she does say the pain has been resolving. She was afebrile overnight. Objective:  /63   Pulse 72   Temp 98 °F (36.7 °C) (Oral)   Resp 16   Ht 5' 4\" (1.626 m)   Wt 250 lb (113.4 kg)   LMP  (LMP Unknown)   SpO2 98%   BMI 42.91 kg/m²     GENERAL:  Laying in bed, awake, alert, cooperative, no apparent distress  HEAD: Normocephalic, atraumatic  EYES: No sclera icterus, pupils equal  LUNGS:  No increased work of breathing  CARDIOVASCULAR:  RR  ABDOMEN:  Soft, non-tender, non-distended  EXTREMITIES: left upper extremity underarm with area of erythema and induration. No fluctuance or area of discrete abscess formation  SKIN: Warm and dry    Assessment/Plan:  78 y.o. female will cellulitis of left underarm    - will obtain CT chest for further evaluation    Electronically signed by María Guerrero MD on 12/9/2020 at 8:03 AM  CT reviewed, no report available yet. No clear drainable fluid collection identified. Recommend continue with IV antibiotics. Continue to follow closely.   Ultimately may require an I&D

## 2020-12-09 NOTE — CARE COORDINATION
Pt currently on iv ancef/po vibramycin. Also has home CPAP([employer]). Surgery consulted; obtaining CT  to eval left axillary cellulitis;pt  states it is less painful. Await final cultures . Afebrile. monitoring for possible home iv atb's.noted to have had afib RVR this am; pt was on Eliquis PTA. John Guzman.

## 2020-12-09 NOTE — PROGRESS NOTES
[address]  Progress Note    Chief complaint :  Chief Complaint   Patient presents with    Abscess     left arm pit, sent over by Dr Nette Jordan Fever       Subjective:    No overnight problems. Patient describes feeling slightly better this morning. She says she has no fever or chills overnight and that she slept much better with her home CPAP machine. She says that the swelling under her arm has improved and it has gone from [place] softball to a baseball\" and it has gone from [employer] to a baseball\". She states she is unable to see the arm so it is difficult to tell if the swelling went down. Tolerating diet. During rounds yesterday she had ice placed on her underarm so we are unable to victor m it. Today we outlined the erythema with a surgical marker. As per nurse, patient had A. fib overnight. She was asymptomatic. Past medical, surgical, family and social history were reviewed, non-contributory, and unchanged unless otherwise stated. Review of Systems   Constitutional: Negative for chills and fever. Respiratory: Negative for cough and shortness of breath. Cardiovascular: Negative for chest pain. Gastrointestinal: Negative for abdominal pain, nausea and vomiting. Skin: Positive for color change (redness in the left axilla). Objective:  /63   Pulse 74   Temp 98.8 °F (37.1 °C) (Oral)   Resp 18   Ht 5' 4\" (1.626 m)   Wt 250 lb (113.4 kg)   LMP  (LMP Unknown)   SpO2 96%   BMI 42.91 kg/m²     Physical Exam  Constitutional:       General: She is not in acute distress. Appearance: Normal appearance. She is not toxic-appearing. HENT:      Head: Normocephalic and atraumatic. Eyes:      Extraocular Movements: Extraocular movements intact. Conjunctiva/sclera: Conjunctivae normal.   Cardiovascular:      Rate and Rhythm: Normal rate and regular rhythm. Heart sounds: Normal heart sounds. No murmur.    Pulmonary:      Effort: 450 E9/L    MPV 11.2 7.0 - 12.0 fL    Neutrophils % 84.6 (H) 43.0 - 80.0 %    Immature Granulocytes % 0.8 0.0 - 5.0 %    Lymphocytes % 4.9 (L) 20.0 - 42.0 %    Monocytes % 9.3 2.0 - 12.0 %    Eosinophils % 0.1 0.0 - 6.0 %    Basophils % 0.3 0.0 - 2.0 %    Neutrophils Absolute 16.36 (H) 1.80 - 7.30 E9/L    Immature Granulocytes # 0.15 E9/L    Lymphocytes Absolute 0.95 (L) 1.50 - 4.00 E9/L    Monocytes Absolute 1.79 (H) 0.10 - 0.95 E9/L    Eosinophils Absolute 0.01 (L) 0.05 - 0.50 E9/L    Basophils Absolute 0.05 0.00 - 0.20 E9/L    RBC Morphology Normal        Radiology and other tests reviewed:  US SOFT TISSUE LIMITED AREA   Final Result   Edematous appearance of the subcutaneous soft tissues in the left axilla   region without focal abnormality identified. Findings require correlation   with clinical data. The differential diagnosis can include areas of   localized cellulitis or lymphedema of undetermined etiology. XR CHEST PORTABLE   Final Result   No acute cardiopulmonary abnormality. Assessment:  Active Hospital Problems    Diagnosis Date Noted    Sepsis due to cellulitis ([facility] [address] [L03.90, A41.9]     Cellulitis [L03.90] 12/07/2020    Paroxysmal atrial fibrillation ([facility] [address] [I48.0] 02/13/2019       Plan:  Left axilla cellulitis   -Leukocytosis  -Unknown source of colonization  -Continue 2 g Ancef every 8 hours, day 2  -Continue doxycycline 100mg every 12h  -Blood cultures pending, no growth 24 hours  -Monitor closely for signs of sepsis  -US: Edematous appearance of subcutaneous soft tissues, no focal abnormality  -Gen Surg consulted: No signs of necrotic tissue      Paroxysmal A.  Fib  -Stable vitals  -Currently in NSR  -EKG: Normal sinus rhythm  -Continue Eliquis 5 mg BID  -Continue Toprol 50 mg daily  -Telemetry monitoring     Home Meds:  Continue Lipitor  Continue Synthroid        DVT ppx: Eliquis  Code Status: Full    [facility] Resident PGY-1  12/08/20   7:34 PM

## 2020-12-10 VITALS
TEMPERATURE: 98 F | OXYGEN SATURATION: 92 % | BODY MASS INDEX: 42.68 KG/M2 | WEIGHT: 250 LBS | SYSTOLIC BLOOD PRESSURE: 118 MMHG | DIASTOLIC BLOOD PRESSURE: 64 MMHG | HEART RATE: 79 BPM | RESPIRATION RATE: 16 BRPM | HEIGHT: 64 IN

## 2020-12-10 LAB
ALBUMIN SERPL-MCNC: 3.1 G/DL (ref 3.5–5.2)
ALP BLD-CCNC: 110 U/L (ref 35–104)
ALT SERPL-CCNC: 20 U/L (ref 0–32)
ANION GAP SERPL CALCULATED.3IONS-SCNC: 11 MMOL/L (ref 7–16)
AST SERPL-CCNC: 39 U/L (ref 0–31)
BASOPHILS ABSOLUTE: 0.04 E9/L (ref 0–0.2)
BASOPHILS RELATIVE PERCENT: 0.3 % (ref 0–2)
BILIRUB SERPL-MCNC: 0.3 MG/DL (ref 0–1.2)
BUN BLDV-MCNC: 20 MG/DL (ref 8–23)
CALCIUM SERPL-MCNC: 8.5 MG/DL (ref 8.6–10.2)
CHLORIDE BLD-SCNC: 103 MMOL/L (ref 98–107)
CO2: 23 MMOL/L (ref 22–29)
CREAT SERPL-MCNC: 0.8 MG/DL (ref 0.5–1)
EOSINOPHILS ABSOLUTE: 0.77 E9/L (ref 0.05–0.5)
EOSINOPHILS RELATIVE PERCENT: 6.2 % (ref 0–6)
GFR AFRICAN AMERICAN: >60
GFR NON-AFRICAN AMERICAN: >60 ML/MIN/1.73
GLUCOSE BLD-MCNC: 104 MG/DL (ref 74–99)
HCT VFR BLD CALC: 36.4 % (ref 34–48)
HEMOGLOBIN: 11.5 G/DL (ref 11.5–15.5)
IMMATURE GRANULOCYTES #: 0.06 E9/L
IMMATURE GRANULOCYTES %: 0.5 % (ref 0–5)
LYMPHOCYTES ABSOLUTE: 1.85 E9/L (ref 1.5–4)
LYMPHOCYTES RELATIVE PERCENT: 14.8 % (ref 20–42)
MCH RBC QN AUTO: 28.3 PG (ref 26–35)
MCHC RBC AUTO-ENTMCNC: 31.6 % (ref 32–34.5)
MCV RBC AUTO: 89.7 FL (ref 80–99.9)
MONOCYTES ABSOLUTE: 1.1 E9/L (ref 0.1–0.95)
MONOCYTES RELATIVE PERCENT: 8.8 % (ref 2–12)
NEUTROPHILS ABSOLUTE: 8.64 E9/L (ref 1.8–7.3)
NEUTROPHILS RELATIVE PERCENT: 69.4 % (ref 43–80)
PDW BLD-RTO: 13.6 FL (ref 11.5–15)
PLATELET # BLD: 228 E9/L (ref 130–450)
PMV BLD AUTO: 11.2 FL (ref 7–12)
POTASSIUM REFLEX MAGNESIUM: 4.1 MMOL/L (ref 3.5–5)
RBC # BLD: 4.06 E12/L (ref 3.5–5.5)
SODIUM BLD-SCNC: 137 MMOL/L (ref 132–146)
TOTAL PROTEIN: 6.7 G/DL (ref 6.4–8.3)
WBC # BLD: 12.5 E9/L (ref 4.5–11.5)

## 2020-12-10 PROCEDURE — 6370000000 HC RX 637 (ALT 250 FOR IP): Performed by: FAMILY MEDICINE

## 2020-12-10 PROCEDURE — 36415 COLL VENOUS BLD VENIPUNCTURE: CPT

## 2020-12-10 PROCEDURE — 99238 HOSP IP/OBS DSCHRG MGMT 30/<: CPT | Performed by: FAMILY MEDICINE

## 2020-12-10 PROCEDURE — 6360000002 HC RX W HCPCS: Performed by: FAMILY MEDICINE

## 2020-12-10 PROCEDURE — 99233 SBSQ HOSP IP/OBS HIGH 50: CPT | Performed by: SURGERY

## 2020-12-10 PROCEDURE — 85025 COMPLETE CBC W/AUTO DIFF WBC: CPT

## 2020-12-10 PROCEDURE — 80053 COMPREHEN METABOLIC PANEL: CPT

## 2020-12-10 PROCEDURE — 2580000003 HC RX 258: Performed by: FAMILY MEDICINE

## 2020-12-10 RX ORDER — DOXYCYCLINE HYCLATE 100 MG
100 TABLET ORAL 2 TIMES DAILY WITH MEALS
Qty: 22 TABLET | Refills: 0 | Status: SHIPPED | OUTPATIENT
Start: 2020-12-10 | End: 2020-12-21

## 2020-12-10 RX ORDER — DOXYCYCLINE HYCLATE 100 MG
100 TABLET ORAL 2 TIMES DAILY WITH MEALS
Qty: 22 TABLET | Refills: 0 | Status: SHIPPED | OUTPATIENT
Start: 2020-12-10 | End: 2020-12-10 | Stop reason: SDUPTHER

## 2020-12-10 RX ORDER — CEFDINIR 300 MG/1
300 CAPSULE ORAL 2 TIMES DAILY
Qty: 22 CAPSULE | Refills: 0 | Status: SHIPPED | OUTPATIENT
Start: 2020-12-10 | End: 2020-12-10 | Stop reason: SDUPTHER

## 2020-12-10 RX ORDER — METOPROLOL SUCCINATE 25 MG/1
25 TABLET, EXTENDED RELEASE ORAL NIGHTLY
Qty: 30 TABLET | Refills: 3 | Status: SHIPPED | OUTPATIENT
Start: 2020-12-10 | End: 2020-12-10

## 2020-12-10 RX ORDER — METOPROLOL SUCCINATE 50 MG/1
50 TABLET, EXTENDED RELEASE ORAL DAILY
Qty: 90 TABLET | Refills: 3 | Status: SHIPPED | OUTPATIENT
Start: 2020-12-10 | End: 2020-12-10 | Stop reason: SDUPTHER

## 2020-12-10 RX ORDER — METOPROLOL SUCCINATE 25 MG/1
25 TABLET, EXTENDED RELEASE ORAL NIGHTLY
Qty: 30 TABLET | Refills: 3 | Status: SHIPPED | OUTPATIENT
Start: 2020-12-10 | End: 2020-12-23 | Stop reason: SDUPTHER

## 2020-12-10 RX ORDER — CEFDINIR 300 MG/1
300 CAPSULE ORAL 2 TIMES DAILY
Qty: 22 CAPSULE | Refills: 0 | Status: SHIPPED | OUTPATIENT
Start: 2020-12-10 | End: 2020-12-21

## 2020-12-10 RX ORDER — METOPROLOL SUCCINATE 50 MG/1
50 TABLET, EXTENDED RELEASE ORAL DAILY
Qty: 90 TABLET | Refills: 3 | Status: SHIPPED | OUTPATIENT
Start: 2020-12-10 | End: 2021-01-25

## 2020-12-10 RX ADMIN — SODIUM CHLORIDE, PRESERVATIVE FREE 10 ML: 5 INJECTION INTRAVENOUS at 11:15

## 2020-12-10 RX ADMIN — Medication 2 G: at 02:49

## 2020-12-10 RX ADMIN — Medication 2 G: at 11:14

## 2020-12-10 RX ADMIN — DOXYCYCLINE HYCLATE 100 MG: 100 CAPSULE ORAL at 09:00

## 2020-12-10 RX ADMIN — LEVOTHYROXINE SODIUM 112 MCG: 112 TABLET ORAL at 06:38

## 2020-12-10 RX ADMIN — METOPROLOL SUCCINATE 50 MG: 50 TABLET, EXTENDED RELEASE ORAL at 09:00

## 2020-12-10 RX ADMIN — Medication 2 G: at 18:07

## 2020-12-10 RX ADMIN — APIXABAN 5 MG: 5 TABLET, FILM COATED ORAL at 11:14

## 2020-12-10 ASSESSMENT — ENCOUNTER SYMPTOMS
VOMITING: 0
CONSTIPATION: 0
DIARRHEA: 0
ABDOMINAL PAIN: 0
COLOR CHANGE: 1
COUGH: 0
NAUSEA: 0
SHORTNESS OF BREATH: 0

## 2020-12-10 NOTE — PROGRESS NOTES
GENERAL SURGERY  DAILY PROGRESS NOTE  12/10/2020  Chief complaint: Armpit swelling  Subjective:  Pt feeling good this morning. Having less pain in the underarm. She feels like it is more warm than yesterday but it does not feel as swollen to her. She is tolerating diet and has been afebrile    Objective:  /63   Pulse 75   Temp 98 °F (36.7 °C) (Oral)   Resp 16   Ht 5' 4\" (1.626 m)   Wt 250 lb (113.4 kg)   LMP  (LMP Unknown)   SpO2 96%   BMI 42.91 kg/m²     GENERAL:  Laying in bed, awake, alert, cooperative, no apparent distress  HEAD: Normocephalic, atraumatic  EYES: No sclera icterus, pupils equal  LUNGS:  No increased work of breathing  CARDIOVASCULAR:  RR  ABDOMEN:  Soft, non-tender, non-distended  EXTREMITIES: left upper extremity underarm with area of erythema. The indurated area has decreased in size compared to yesterday. SKIN: Warm and dry    Assessment/Plan:  78 y.o. female will cellulitis of left underarm    - CT did not show any abscess  - will continue to follow closely in the event a discrete fluid collection develops   - leukocytosis improving - continue antibiotics     Electronically signed by Vinod Gomez MD on 12/10/2020 at 6:36 AM  Attending Physician Statement:  I have examined the patient, reviewed the record, and discussed the case with the surgical team.  I agree with the assessment and plan with the following additions, corrections, and changes. Feeling better today. Reports decreased discomfort. On exam, significant improvement in erythema. Induration approximately 50% loss. No benson fluctuance. Appears to be responding well to antibiotics. No need for incision and drainage at this time. Kirk Alfaro Human, DO, FACS    NOTE: This report was transcribed using voice recognition software. Every effort was made to ensure accuracy; however, inadvertent computerized transcription errors may be present.

## 2020-12-10 NOTE — PROGRESS NOTES
[address]  Progress Note    Chief complaint :  Chief Complaint   Patient presents with    Abscess     left arm pit, sent over by Dr Chuyita Dove Fever       Subjective:    No overnight problems. Patient describes feeling \"a little bit better today\". Patient states that the swelling under her arm has seemed to improve slightly over the last 24 hours. She denies any fever or chills. She not had any symptoms of A. fib. Tolerating diet. As per nurse, patient had 2 episodes of A. fib overnight, 1 with a heart rate of slightly over 100 and one with a heart rate in the 130s    Past medical, surgical, family and social history were reviewed, non-contributory, and unchanged unless otherwise stated. Review of Systems   Constitutional: Negative for chills and fever. Respiratory: Negative for cough and shortness of breath. Cardiovascular: Negative for chest pain. Gastrointestinal: Negative for abdominal pain, constipation, diarrhea, nausea and vomiting. Genitourinary: Negative for difficulty urinating, dysuria, frequency and hematuria. Skin: Positive for color change (redness in the left axilla, improved based on outline). Objective:  BP (!) 155/67   Pulse 80   Temp 98.5 °F (36.9 °C) (Oral)   Resp 16   Ht 5' 4\" (1.626 m)   Wt 250 lb (113.4 kg)   LMP  (LMP Unknown)   SpO2 96%   BMI 42.91 kg/m²     Physical Exam  Constitutional:       General: She is not in acute distress. Appearance: Normal appearance. She is not toxic-appearing. HENT:      Head: Normocephalic and atraumatic. Eyes:      Extraocular Movements: Extraocular movements intact. Conjunctiva/sclera: Conjunctivae normal.   Cardiovascular:      Rate and Rhythm: Normal rate and regular rhythm. Heart sounds: Normal heart sounds. No murmur. Pulmonary:      Effort: Pulmonary effort is normal.      Breath sounds: Normal breath sounds. Musculoskeletal:         General: No swelling. Skin:     General: Skin is warm. Findings: Erythema present. Comments: Large indurated, warm, and erythematous rash in left axilla and back,  retreating from outline on 12/9. Mild improvement in induration. Tender to palpation has improved. No purulence, drainage, or fluctuance appreciated. Neurological:      Mental Status: She is alert.          Labs:  Recent Results (from the past 24 hour(s))   Comprehensive Metabolic Panel w/ Reflex to MG    Collection Time: 12/09/20  4:07 AM   Result Value Ref Range    Sodium 136 132 - 146 mmol/L    Potassium reflex Magnesium 4.0 3.5 - 5.0 mmol/L    Chloride 105 98 - 107 mmol/L    CO2 21 (L) 22 - 29 mmol/L    Anion Gap 10 7 - 16 mmol/L    Glucose 111 (H) 74 - 99 mg/dL    BUN 20 8 - 23 mg/dL    CREATININE 0.9 0.5 - 1.0 mg/dL    GFR Non-African American >60 >=60 mL/min/1.73    GFR African American >60     Calcium 8.6 8.6 - 10.2 mg/dL    Total Protein 6.8 6.4 - 8.3 g/dL    Alb 3.2 (L) 3.5 - 5.2 g/dL    Total Bilirubin 0.5 0.0 - 1.2 mg/dL    Alkaline Phosphatase 102 35 - 104 U/L    ALT 10 0 - 32 U/L    AST 22 0 - 31 U/L   CBC auto differential    Collection Time: 12/09/20  4:07 AM   Result Value Ref Range    WBC 19.0 (H) 4.5 - 11.5 E9/L    RBC 4.12 3.50 - 5.50 E12/L    Hemoglobin 12.0 11.5 - 15.5 g/dL    Hematocrit 37.2 34.0 - 48.0 %    MCV 90.3 80.0 - 99.9 fL    MCH 29.1 26.0 - 35.0 pg    MCHC 32.3 32.0 - 34.5 %    RDW 13.7 11.5 - 15.0 fL    Platelets 878 521 - 952 E9/L    MPV 11.2 7.0 - 12.0 fL    Neutrophils % 79.0 43.0 - 80.0 %    Immature Granulocytes % 0.5 0.0 - 5.0 %    Lymphocytes % 10.8 (L) 20.0 - 42.0 %    Monocytes % 6.9 2.0 - 12.0 %    Eosinophils % 2.6 0.0 - 6.0 %    Basophils % 0.2 0.0 - 2.0 %    Neutrophils Absolute 15.04 (H) 1.80 - 7.30 E9/L    Immature Granulocytes # 0.10 E9/L    Lymphocytes Absolute 2.06 1.50 - 4.00 E9/L    Monocytes Absolute 1.31 (H) 0.10 - 0.95 E9/L    Eosinophils Absolute 0.49 0.05 - 0.50 E9/L    Basophils Absolute 0.04 0.00 - 0.20 E9/L       Radiology and other tests reviewed:  CT CHEST W CONTRAST   Final Result   1. Findings consistent with cellulitis in the subcutaneous fat of the left   axilla and upper lateral chest wall. No evidence of an abscess. 2. Otherwise, unremarkable CT of the chest.      US SOFT TISSUE LIMITED AREA   Final Result   Edematous appearance of the subcutaneous soft tissues in the left axilla   region without focal abnormality identified. Findings require correlation   with clinical data. The differential diagnosis can include areas of   localized cellulitis or lymphedema of undetermined etiology. XR CHEST PORTABLE   Final Result   No acute cardiopulmonary abnormality. Assessment:  Active Hospital Problems    Diagnosis Date Noted    Paroxysmal atrial fibrillation with RVR ([facility]) [I48.0]     Sepsis due to cellulitis ([facility]) [L03.90, A41.9]     Cellulitis [L03.90] 12/07/2020    Paroxysmal atrial fibrillation ([facility] [address] [I48.0] 02/13/2019       Plan:  Left axilla cellulitis   -Improving leukocytosis  -Unknown source of colonization  -Continue 2 g Ancef every 8 hours, day 2  -Continue doxycycline 100mg every 12h  -Blood cultures pending, no growth 24 hours  -Monitor closely for signs of sepsis  -US: Edematous appearance of subcutaneous soft tissues, no focal abnormality  -CT Chest: Findings consistent with cellulitis, no evidence of abscess  -Gen Surg consulted: No signs of necrotic tissue, continue IV antbiotics     Paroxysmal A.  Fib  -Stable vitals  -Currently in NSR  -EKG: Normal sinus rhythm  -Continue Eliquis 5 mg BID  -Continue Toprol 50 mg daily  -Added 25 mg Toprol 25 mg nightly  -Telemetry monitoring     Home Meds:  Continue Lipitor  Continue Synthroid        DVT ppx: Eliquis  Code Status: Full    [address] Resident PGY-1  12/09/20   8:36 PM

## 2020-12-10 NOTE — PROGRESS NOTES
P Quality Flow/Interdisciplinary Rounds Progress Note        Quality Flow Rounds held on December 10, 2020    Disciplines Attending:  Bedside Nurse, ,  and Nursing Unit 3 [facility] Emmy was admitted on 12/7/2020  4:03 PM    Anticipated Discharge Date:  Expected Discharge Date: (N/A)    Disposition:    Richard Score:  Richard Scale Score: 20    Readmission Risk              Risk of Unplanned Readmission:        10           Discussed patient goal for the day, patient clinical progression, and barriers to discharge.   The following Goal(s) of the Day/Commitment(s) have been identified:  Discharge Planning      [facility]  December 10, 2020

## 2020-12-10 NOTE — DISCHARGE SUMMARY
Physician Discharge Summary  [id number] [facility]     Patient ID:  Solo Willett  [id number]  78 y.o.  1950    Admit date: 12/7/2020    Discharge date and time:  12/10/2020  6:47 PM    Admitting Physician: Petros Acosta MD     Admission Diagnoses:   Sepsis due to cellulitis ([address] [L03.90, A41.9]  Sepsis due to cellulitis ([address] [L03.90, A41.9]  Cellulitis [L03.90]    Discharge Diagnoses:  Principal Problem:    Cellulitis  Active Problems:    Paroxysmal atrial fibrillation ([address]  Resolved Problems:    Sepsis due to cellulitis (HCC)    Paroxysmal atrial fibrillation with RVR ([address]      Consults: general surgery    Procedures: None    Hospital Course: Solo Willett is a 78 y.o. female who presented for evaluation of left armpit swelling and fever for 3 days. . She was found to have left axillary cellulitis and she was admitted for IV antibiotic treatment. She was started on Ancef and doxycycline and the area of erythema was outlined. She was found to be febrile twice in the ER but did not have any fever after. Erythema and tenderness improved daily, and she progressed well with antibiotics. She did have multiple episodes of A. fib in the lungs. 25 mg Toprol XL was added nightly to her 50 mg Toprol XL daily. She did have some improvement in the number of episodes of A. Fib following morning. She was discharged on oral Omnicef twice daily and doxycycline twice daily for 11 additional days and we also added 25 mg Toprol XL nightly to her current regiment. She had an otherwise uneventful course and progressed well. Pain was well controlled. She was tolerating a regular diet with no nausea or vomiting, was ambulating well, and was in a suitable condition for discharge to home.      Lab Results   Component Value Date    WBC 12.5 12/10/2020    HGB 11.5 12/10/2020     12/10/2020     12/10/2020     12/10/2020    K 4.1 12/10/2020    BUN 20 12/10/2020    CREATININE 0.8 12/10/2020 GLUCOSE 104 12/10/2020    GLUCOSE 91 06/22/2011    LABGLOM >60 12/10/2020    LABALBU 3.1 12/10/2020    LABALBU 4.2 06/22/2011    PROT 6.7 12/10/2020    CALCIUM 8.5 12/10/2020    BILITOT 0.3 12/10/2020    ALKPHOS 110 12/10/2020    AST 39 12/10/2020    ALT 20 12/10/2020       Discharge Exam:   VITALS: /64   Pulse 79   Temp 98 °F (36.7 °C) (Oral)   Resp 16   Ht 5' 4\" (1.626 m)   Wt 250 lb (113.4 kg)   LMP  (LMP Unknown)   SpO2 92%   BMI 42.91 kg/m²     Physical Exam  Constitutional:       General: She is not in acute distress. Appearance: Normal appearance. She is not toxic-appearing. HENT:      Head: Normocephalic and atraumatic. Eyes:      Extraocular Movements: Extraocular movements intact. Conjunctiva/sclera: Conjunctivae normal.   Cardiovascular:      Rate and Rhythm: Normal rate and regular rhythm. Heart sounds: Normal heart sounds. No murmur. Pulmonary:      Effort: Pulmonary effort is normal.      Breath sounds: Normal breath sounds. Musculoskeletal:         General: No swelling. Skin:     General: Skin is warm. Findings: Erythema present. Comments: Large indurated, warm, and erythematous rash in left axilla and back,  retreating from outline on 12/9. Mild improvement in induration. Tender to palpation has improved. No purulence, drainage, or fluctuance appreciated. Neurological:      Mental Status: She is alert. Disposition: home  Condition: good    Patient Instructions:   Discharge Medication List as of 12/10/2020  6:04 PM           Details   !! metoprolol succinate (TOPROL XL) 25 MG extended release tablet Take 1 tablet by mouth nightly, Disp-30 tablet,R-3Normal       !! - Potential duplicate medications found. Please discuss with provider.            Details   cefdinir (OMNICEF) 300 MG capsule Take 1 capsule by mouth 2 times daily for 11 days, Disp-22 capsule,R-0Normal      doxycycline hyclate (VIBRA-TABS) 100 MG tablet Take 1 tablet by mouth 2 times daily (with meals) for 11 days, Disp-22 tablet,R-0Normal              Details   levothyroxine (SYNTHROID) 112 MCG tablet Take 1 tablet by mouth daily, Disp-90 tablet,R-2Normal      atorvastatin (LIPITOR) 40 MG tablet Take 1 tablet by mouth nightly, Disp-90 tablet,R-1Normal      apixaban (ELIQUIS) 5 MG TABS tablet Take 1 tablet by mouth 2 times daily, Disp-180 tablet, R-3Normal      !! metoprolol succinate (TOPROL XL) 50 MG extended release tablet Take 1 tablet by mouth daily, Disp-90 tablet, R-3Normal      polyethyl glycol-propyl glycol 0.4-0.3 % (SYSTANE) 0.4-0.3 % ophthalmic solution 2 drops as needed for Dry EyesHistorical Med      Multiple Vitamins-Minerals (CENTRUM SILVER ULTRA WOMENS PO) Take by mouthHistorical Med      Calcium-Magnesium-Vitamin D (CALCIUM 1200+D3 PO) Take by mouthHistorical Med      Cholecalciferol (VITAMIN D3) 5000 units TABS Take by mouth daily Historical Med      Omega-3 Fatty Acids (FISH OIL TRIPLE STRENGTH) 1400 MG CAPS Take by mouthHistorical Med       !! - Potential duplicate medications found. Please discuss with provider.          Discharge Medication List as of 12/10/2020  6:04 PM        Discharge Medication List as of 12/10/2020  6:04 PM      CONTINUE these medications which have NOT CHANGED    Details   levothyroxine (SYNTHROID) 112 MCG tablet Take 1 tablet by mouth daily, Disp-90 tablet,R-2Normal      atorvastatin (LIPITOR) 40 MG tablet Take 1 tablet by mouth nightly, Disp-90 tablet,R-1Normal      apixaban (ELIQUIS) 5 MG TABS tablet Take 1 tablet by mouth 2 times daily, Disp-180 tablet, R-3Normal      !! metoprolol succinate (TOPROL XL) 50 MG extended release tablet Take 1 tablet by mouth daily, Disp-90 tablet, R-3Normal      polyethyl glycol-propyl glycol 0.4-0.3 % (SYSTANE) 0.4-0.3 % ophthalmic solution 2 drops as needed for Dry EyesHistorical Med      Multiple Vitamins-Minerals (CENTRUM SILVER ULTRA WOMENS PO) Take by mouthHistorical Med      Calcium-Magnesium-Vitamin D (CALCIUM 1200+D3 PO) Take by mouthHistorical Med      Cholecalciferol (VITAMIN D3) 5000 units TABS Take by mouth daily Historical Med      Omega-3 Fatty Acids (FISH OIL TRIPLE STRENGTH) 1400 MG CAPS Take by mouthHistorical Med       !! - Potential duplicate medications found. Please discuss with provider.            Activity: activity as tolerated  Diet: regular diet    Discharge instructions:   Eulogio Schrader DO  [address] [place] [address] [address] [id number]  [phone]    Go on 12/17/2020  9:30 AM hospital f/u    Continue Omnicef twice daily and doxycycline twice daily for 11 days    Apply warm compress to area    Signed:  Gene Aragon  12/11/2020  8:39 AM

## 2020-12-10 NOTE — PROGRESS NOTES
Perfect Served resident [facility] for discharge? [id number] for patient to go home on  PO Meds? Brock Bills  12/10/2020  12:12 PM  Response From Dr. Amaury Mazariegos No Discharge. They may be planning to attempt incision and drainage   Still waiting for final word from Dr. Kaleb Morillo.    Brock Bills  12/10/2020  12:31 PM

## 2020-12-12 LAB
BLOOD CULTURE, ROUTINE: NORMAL
CULTURE, BLOOD 2: NORMAL

## 2020-12-13 NOTE — PROGRESS NOTES
Physician Progress Note      Kalee Govea  [facility] #:                  [id number]  :                       1950  ADMIT DATE:       2020 4:03 PM  [address] DATE:        12/10/2020 6:47 PM  RESPONDING  PROVIDER #:        Lizbeth Mcgovern MD          QUERY TEXT:    Pt admitted with \". Blanchie Bevels Blanchie Bevels Left axillary cellulitis. Blanchie Bevels Blanchie Bevels \". Pt noted to have WBC 19.6,   temp 101.7 and  on admission with \". ..question beginning of sepsis. Blanchie Bevels Blanchie Bevels \"   in progress note on . Per ED note, \". Blanchie Bevels Blanchie Bevels Patient does have an elevated white   count and is tachycardia. Blanchie Bevels Blanchie Bevels It appears that she has sepsis. Blanchie Bevels Blanchie Bevels \". If possible,   please document in progress notes and discharge summary the following   regarding sepsis:    The medical record reflects the following:  Risk Factors: cellulitis  Clinical Indicators: On admission:  WBC 19.6, temp 101.7 and ;  per ED   provider note, \". Blanchie Bevels Blanchie Bevels Patient does have an elevated white count and is   tachycardia. It appears that she has sepsis. Patient given 2 g Ancef here in   the ER. ..\";  Treatment:  IVF bolus; IV Ancef; PO Doxy  Options provided:  -- Sepsis confirmed, present on admission  -- Sepsis was not present on admission and developed during the inpatient stay  -- Other - I will add my own diagnosis  -- Disagree - Not applicable / Not valid  -- Disagree - Clinically unable to determine / Unknown  -- Refer to Clinical Documentation Reviewer    PROVIDER RESPONSE TEXT:    Sepsis was confirmed and present on admission.     Query created by: [place] on 12/10/2020 8:19 AM      Electronically signed by:  Lizbeth Mcgovern MD 2020 10:17 PM

## 2020-12-17 ENCOUNTER — OFFICE VISIT (OUTPATIENT)
Dept: FAMILY MEDICINE CLINIC | Age: 70
End: 2020-12-17
Payer: MEDICARE

## 2020-12-17 VITALS
HEART RATE: 59 BPM | DIASTOLIC BLOOD PRESSURE: 72 MMHG | WEIGHT: 270 LBS | TEMPERATURE: 97.3 F | BODY MASS INDEX: 46.1 KG/M2 | HEIGHT: 64 IN | SYSTOLIC BLOOD PRESSURE: 125 MMHG

## 2020-12-17 PROCEDURE — 99213 OFFICE O/P EST LOW 20 MIN: CPT | Performed by: STUDENT IN AN ORGANIZED HEALTH CARE EDUCATION/TRAINING PROGRAM

## 2020-12-17 NOTE — PROGRESS NOTES
Subjective:    Sinan Sweet is here for a follow up for cellulitis of the left axilla. She was hospitalized for 2-3 days on Ancef and Doxycycline then home on Omnicef and Doxy. She is doing better. ROS: Otherwise negative    Patient Active Problem List   Diagnosis    Vitamin D deficiency    Mixed hyperlipidemia    SOB (shortness of breath)    Paroxysmal atrial fibrillation (HCC)    Sleep apnea    Cellulitis       Past medical, surgical, family and social history were reviewed, non-contributory, and unchanged unless otherwise stated. Objective:    /72   Pulse 59   Temp 97.3 °F (36.3 °C) (Temporal)   Ht 5' 4\" (1.626 m)   Wt 270 lb (122.5 kg)   LMP  (LMP Unknown)   BMI 46.35 kg/m²     Exam is as noted by resident with the following changes, additions or corrections:      Assessment/Plan:        Sinan Sweet was seen today for follow-up from hospital.    Diagnoses and all orders for this visit:    Cellulitis of left axilla           Attending Physician Statement    I have reviewed the chart, including any radiology or labs. I have discussed the case, including pertinent history and exam findings with the resident. I agree with the assessment, plan and orders as documented by the resident. Please refer to the resident note for additional information.       Electronically signed by Monster Valero DO on 12/21/2020 at 5:31 PM

## 2020-12-17 NOTE — PROGRESS NOTES
[facility]  Department of Family Medicine  Family Medicine Residency Program      Patient:  Odalis Bettencourt 78 y.o. female     Date of Service: 20      Chief complaint:   Chief Complaint   Patient presents with    Follow-Up from Hospital     cellulitis in the left armpit, back area          History ofPresent Illness   The patient is a 78 y.o. female  presented to the clinic with complaints as above. Cellulitis  -Hospital follow up  -IV antibiotics ancef and doxy  -had a fib while in hospital (chronic issue), and adjust toprol  -discharged home on doxy and omnicef for 11 days  -currently, doing ok, cellulitis is improving greatly but still there  -has 5 more days of her oral abx left  -denies any fever, chills, nausea, or vomiting   -pain improving     Past Medical History:      Diagnosis Date    Hypothyroidism     Sleep apnea     On CPAP, wears nightly        PastSurgical History:        Procedure Laterality Date     SECTION      x2    COLONOSCOPY      HYSTERECTOMY, TOTAL ABDOMINAL      WISDOM TOOTH EXTRACTION         Allergies:    Patient has no known allergies.     Social History:   Social History     Socioeconomic History    Marital status:      Spouse name: Not on file    Number of children: Not on file    Years of education: Not on file    Highest education level: Not on file   Occupational History    Not on file   Social Needs    Financial resource strain: Not on file    Food insecurity     Worry: Not on file     Inability: Not on file    Transportation needs     Medical: Not on file     Non-medical: Not on file   Tobacco Use    Smoking status: Never Smoker    Smokeless tobacco: Never Used   Substance and Sexual Activity    Alcohol use: No    Drug use: No    Sexual activity: Not Currently   Lifestyle    Physical activity     Days per week: Not on file     Minutes per session: Not on file    Stress: Not on file   Relationships General: No tenderness. Skin:     General: Skin is warm and dry. Comments: Left axilla - see picture below  Area of induration across the cellulitic area was approx. 6 inches   Neurological:      Mental Status: She is alert and oriented to person, place, and time. Psychiatric:         Behavior: Behavior normal.       Left Axilla:            Assessment and Plan       1. Cellulitis of left axilla  Hospital F/u  -Continues to improve  -Denies fever or chills  -Vitals stable  -Continue current coarse of abx  -Close follow up in 6 days after abx coarse finished    Of note: patient's heart rate and BP appropriate, so will continue the current dose of toprol       Counseled regarding above diagnosis, including possible risks and complications,  especially if left uncontrolled. Counseled regarding the possible side effects, risks, benefits and alternatives to treatment;patient and/or guardian verbalizes understanding, agrees, feels comfortable with and wishes to proceed with above treatment plan. Call or go to [address] [place] if symptoms worsen or persist. Advised patient to call with any new medication issues, and, as applicable, read all Rx info from pharmacy to assure aware of all possible risks and side effects of medicationbefore taking. Patient and/or guardian given opportunity to ask questions/raise concerns. The patient verbalized comfort and understanding ofinstructions. I encourage further reading and education about your health conditions. Information on many health conditions is provided by [facility] [employer]: [email or web address] org/  Please bring any questions to me at your nextvisit. Return to Office: Return in about 6 days (around 12/23/2020) for f/u of cellulitis of left axilla .     Medication List:    Current Outpatient Medications   Medication Sig Dispense Refill    apixaban (ELIQUIS) 5 MG TABS tablet Take 1 tablet by mouth 2 times daily 180 tablet 1  cefdinir (OMNICEF) 300 MG capsule Take 1 capsule by mouth 2 times daily for 11 days 22 capsule 0    doxycycline hyclate (VIBRA-TABS) 100 MG tablet Take 1 tablet by mouth 2 times daily (with meals) for 11 days 22 tablet 0    metoprolol succinate (TOPROL XL) 50 MG extended release tablet Take 1 tablet by mouth daily 90 tablet 3    metoprolol succinate (TOPROL XL) 25 MG extended release tablet Take 1 tablet by mouth nightly 30 tablet 3    levothyroxine (SYNTHROID) 112 MCG tablet Take 1 tablet by mouth daily 90 tablet 2    atorvastatin (LIPITOR) 40 MG tablet Take 1 tablet by mouth nightly 90 tablet 1    polyethyl glycol-propyl glycol 0.4-0.3 % (SYSTANE) 0.4-0.3 % ophthalmic solution 2 drops as needed for Dry Eyes      Multiple Vitamins-Minerals (CENTRUM SILVER ULTRA WOMENS PO) Take by mouth      Calcium-Magnesium-Vitamin D (CALCIUM 1200+D3 PO) Take by mouth      Cholecalciferol (VITAMIN D3) 5000 units TABS Take by mouth daily       Omega-3 Fatty Acids (FISH OIL TRIPLE STRENGTH) 1400 MG CAPS Take by mouth       No current facility-administered medications for this visit. Donavon Best DO       This document may have been prepared at least partially through the use of voice recognition software. Although effort is taken to assure the accuracy ofthis document, it is possible that grammatical, syntax,  or spelling errors may occur.

## 2020-12-23 ENCOUNTER — OFFICE VISIT (OUTPATIENT)
Dept: FAMILY MEDICINE CLINIC | Age: 70
End: 2020-12-23
Payer: MEDICARE

## 2020-12-23 VITALS
BODY MASS INDEX: 46.1 KG/M2 | WEIGHT: 270 LBS | RESPIRATION RATE: 16 BRPM | TEMPERATURE: 96.9 F | SYSTOLIC BLOOD PRESSURE: 108 MMHG | OXYGEN SATURATION: 98 % | DIASTOLIC BLOOD PRESSURE: 49 MMHG | HEART RATE: 61 BPM | HEIGHT: 64 IN

## 2020-12-23 PROCEDURE — 99213 OFFICE O/P EST LOW 20 MIN: CPT | Performed by: STUDENT IN AN ORGANIZED HEALTH CARE EDUCATION/TRAINING PROGRAM

## 2020-12-23 PROCEDURE — 90694 VACC AIIV4 NO PRSRV 0.5ML IM: CPT | Performed by: STUDENT IN AN ORGANIZED HEALTH CARE EDUCATION/TRAINING PROGRAM

## 2020-12-23 PROCEDURE — G0008 ADMIN INFLUENZA VIRUS VAC: HCPCS | Performed by: STUDENT IN AN ORGANIZED HEALTH CARE EDUCATION/TRAINING PROGRAM

## 2020-12-23 RX ORDER — METOPROLOL SUCCINATE 25 MG/1
25 TABLET, EXTENDED RELEASE ORAL NIGHTLY
Qty: 90 TABLET | Refills: 3 | Status: SHIPPED
Start: 2020-12-23 | End: 2021-01-25

## 2020-12-23 NOTE — PROGRESS NOTES
[facility]  Family Medicine Residency Program  Phone: [phone]  Fax: [phone]    Patient:  Louann Jack 78 y.o. female                                 Date of Service: 12/23/20                            Chiefcomplaint:   Chief Complaint   Patient presents with    Cellulitis     left axilla         History of Present Illness: The patient is a 78 y.o. female  presented to the clinic ; Follow up for cellulitis left axilla  Cellulitis: follow up  -IV antibiotics ancef and doxy X 4 days while in hospital   -discharged home on doxy and omnicef for 11 days  -cellulitis is improving significantly but still small resolving  induration is there  -has completed abx ; yesterday  -denies any fever, chills, nausea, or vomiting   -no to minimal pain or tenderness  - Overall ,Patient is feeling very well, very small induration, otherwise no fever no chills no dizziness.   -had a fib while in hospital (chronic issue), and adjusted toprol, no concern today,. Review of Systems:   Review of Systems   Constitutional: Negative. Negative for chills and fever. HENT: Negative. Negative for congestion and sore throat. Eyes: Negative. Negative for redness. Respiratory: Negative. Negative for cough and shortness of breath. Cardiovascular: Negative for chest pain and leg swelling. Gastrointestinal: Negative. Negative for abdominal pain, constipation, diarrhea, nausea and vomiting. Endocrine: Negative. Genitourinary: Negative. Negative for dysuria, frequency and hematuria. Musculoskeletal: Negative. Negative for back pain. Skin: Negative. Negative for wound (resolving cellulitis in left axilla). Allergic/Immunologic: Negative. Neurological: Negative. Negative for dizziness, light-headedness and headaches. Hematological: Negative. Psychiatric/Behavioral: Negative. Negative for confusion. The patient is not nervous/anxious.         Past Medical History: Diagnosis Date    Hypothyroidism     Sleep apnea     On CPAP, wears nightly        Past Surgical History:        Procedure Laterality Date     SECTION      x2    COLONOSCOPY      HYSTERECTOMY, TOTAL ABDOMINAL      WISDOM TOOTH EXTRACTION         Allergies:    Patient has no known allergies.     Social History:   Social History     Socioeconomic History    Marital status:      Spouse name: Not on file    Number of children: Not on file    Years of education: Not on file    Highest education level: Not on file   Occupational History    Not on file   Social Needs    Financial resource strain: Not on file    Food insecurity     Worry: Not on file     Inability: Not on file    Transportation needs     Medical: Not on file     Non-medical: Not on file   Tobacco Use    Smoking status: Never Smoker    Smokeless tobacco: Never Used   Substance and Sexual Activity    Alcohol use: No    Drug use: No    Sexual activity: Not Currently   Lifestyle    Physical activity     Days per week: Not on file     Minutes per session: Not on file    Stress: Not on file   Relationships    Social connections     Talks on phone: Not on file     Gets together: Not on file     Attends [employer] service: Not on file     Active member of club or organization: Not on file     Attends meetings of clubs or organizations: Not on file     Relationship status: Not on file    Intimate partner violence     Fear of current or ex partner: Not on file     Emotionally abused: Not on file     Physically abused: Not on file     Forced sexual activity: Not on file   Other Topics Concern    Not on file   Social History Narrative    Not on file        Family History:       Problem Relation Age of Onset    Stroke Mother 35   Faby Lamont Rheum Arthritis Mother     COPD Father     Colon Polyps Father     Mult Sclerosis Father 48    Hypothyroidism Brother     Rheum Arthritis Brother     Psoriasis Brother     Arthritis Brother  COPD Brother     Other Brother         Venous stasis    High Blood Pressure Brother     Arthritis Brother     Glaucoma Brother     Hypothyroidism Maternal Grandmother     Deep Vein Thrombosis Maternal Grandmother     Heart Disease Maternal Grandfather     Atrial Fibrillation Paternal Grandmother     Hypothyroidism Paternal Grandmother     Cancer Paternal Grandfather        BP Readings from Last 3 Encounters:   12/23/20 (!) 108/49   12/17/20 125/72   12/10/20 118/64       Physical Exam:    Vitals: BP (!) 108/49   Pulse 61   Temp 96.9 °F (36.1 °C) (Temporal)   Resp 16   Ht 5' 4\" (1.626 m)   Wt 270 lb (122.5 kg)   LMP  (LMP Unknown)   SpO2 98%   Breastfeeding No   BMI 46.35 kg/m²   General Appearance: Well developed, awake, alert, oriented, no acute distress  HEENT: Normocephalic,atraumatic. PERRL, EOM's intact, EAC without erythemaor swelling, no pallor or icterus. Neck: Supple, symmetrical, trachea midline. No JVD. Chest wall/Lung: Clear to auscultation bilaterally,  respirations unlabored. No ronchi/wheezing/rales  -Resolving cellulitis on undersurface of left axilla and upper back, no redness, minimal tender,  Heart[de-identified]  Regular rate and rhythm, S1and S2 normal, no murmur, rub or gallop. Abdomen: Soft, non-tender, bowel sounds normoactive, no masses, no organomegaly  Extremities:  Extremities normal, atraumatic, no cyanosis. no edema. Skin: Skin color, texture, turgor normal, no rashes or lesions  Musculokeletal: ROM grossly normal in all joints of extremities, no obvious joint swelling. Lymph nodes: no lymph node enlargement appreciated  Neurologic:   Alert&Oriented. Normal gait and coordination  No focal neurological deficits appreaciated         Psychiatric: has a normal mood and affect. Behavior is normal.       Assessment and Plan:         Whit Ro was seen today for cellulitis.     Diagnoses and all orders for this visit:    Cellulitis of left axilla -Patient is here for follow-up for cellulitis on  undersurface of left axilla  -She was treated with IV antibiotic for 4 days and discharged home on doxy and omnicef for 11 days   -She already completed adequate course of antibiotics, cellulitis has been resolved significantly  -We advised to keep eye on it and she will call us if it gets bigger and more painful      Paroxysmal atrial fibrillation (HCC)  -History of paroxysmal A. Fib, stable, just wants to refill medications  -     metoprolol succinate (TOPROL XL) 25 MG extended release tablet; Take 1 tablet by mouth nightly        Health maintenance: Got flu vaccine today  Need for influenza vaccination  -     INFLUENZA, QUADV, ADJUVANTED, 65 YRS =, IM, PF, PREFILL SYR, 0.5ML (FLUAD)          I encourage further reading and education about your health conditions. Information on many healthconditions is provided by the [employer] Academy of Family [employer]icians: [email or web address]. org/  Please bring any questions to me at your next visit. Return to Office: Return for Routine Follow up.     Medication List:    Current Outpatient Medications   Medication Sig Dispense Refill    metoprolol succinate (TOPROL XL) 25 MG extended release tablet Take 1 tablet by mouth nightly 90 tablet 3    apixaban (ELIQUIS) 5 MG TABS tablet Take 1 tablet by mouth 2 times daily 180 tablet 1    metoprolol succinate (TOPROL XL) 50 MG extended release tablet Take 1 tablet by mouth daily 90 tablet 3    levothyroxine (SYNTHROID) 112 MCG tablet Take 1 tablet by mouth daily 90 tablet 2    atorvastatin (LIPITOR) 40 MG tablet Take 1 tablet by mouth nightly 90 tablet 1    polyethyl glycol-propyl glycol 0.4-0.3 % (SYSTANE) 0.4-0.3 % ophthalmic solution 2 drops as needed for Dry Eyes      Multiple Vitamins-Minerals (CENTRUM SILVER ULTRA WOMENS PO) Take by mouth      Calcium-Magnesium-Vitamin D (CALCIUM 1200+D3 PO) Take by mouth  Cholecalciferol (VITAMIN D3) 5000 units TABS Take by mouth daily       Omega-3 Fatty Acids (FISH OIL TRIPLE STRENGTH) 1400 MG CAPS Take by mouth       No current facility-administered medications for this visit. Basil Garcia MD       This document may have been prepared at least partiallythrough the use of voice recognition software. Although effort is taken to assure the accuracy of this document, it is possible that grammatical, syntax,  or spelling errors may occur.

## 2020-12-23 NOTE — PROGRESS NOTES
[facility] [address]  Precepting Note    Subjective:  79 yo F with recent admisison of severe cellulitis left axilla; did not have a focal abscess for drianage. She was discharged on doxycycline and omnicef  She has completed course of antibiotics. Redness/ pain has improved  Small amount of induration     ROS otherwise as per resident note    Past medical, surgical, family and social history were reviewed, non-contributory, and unchanged unless otherwise stated. Objective:    BP (!) 108/49   Pulse 61   Temp 96.9 °F (36.1 °C) (Temporal)   Resp 16   Ht 5' 4\" (1.626 m)   Wt 270 lb (122.5 kg)   LMP  (LMP Unknown)   SpO2 98%   Breastfeeding No   BMI 46.35 kg/m²     Exam is as noted by resident     Assessment/Plan:  Cellulitis left axilla - improved  Okay to stay off antibiotics  Flu shot today     Attending Physician Statement  I have reviewed the chart, including any radiology or labs. I have discussed the case, including pertinent history and exam findings with the resident. I agree with the assessment, plan and orders as documented by the resident. Please refer to the resident note for additional information.       Electronically signed by Tarun Madrigal MD on 12/23/2020 at 1:48 PM

## 2020-12-25 ASSESSMENT — ENCOUNTER SYMPTOMS
RESPIRATORY NEGATIVE: 1
BACK PAIN: 0
VOMITING: 0
EYES NEGATIVE: 1
ALLERGIC/IMMUNOLOGIC NEGATIVE: 1
GASTROINTESTINAL NEGATIVE: 1
NAUSEA: 0
CONSTIPATION: 0
SHORTNESS OF BREATH: 0
COUGH: 0
ABDOMINAL PAIN: 0
DIARRHEA: 0
SORE THROAT: 0
EYE REDNESS: 0

## 2021-01-25 ENCOUNTER — OFFICE VISIT (OUTPATIENT)
Dept: FAMILY MEDICINE CLINIC | Age: 71
End: 2021-01-25
Payer: MEDICARE

## 2021-01-25 VITALS
DIASTOLIC BLOOD PRESSURE: 85 MMHG | OXYGEN SATURATION: 97 % | RESPIRATION RATE: 16 BRPM | BODY MASS INDEX: 45.75 KG/M2 | TEMPERATURE: 97.3 F | SYSTOLIC BLOOD PRESSURE: 138 MMHG | WEIGHT: 268 LBS | HEART RATE: 88 BPM | HEIGHT: 64 IN

## 2021-01-25 DIAGNOSIS — E03.9 ACQUIRED HYPOTHYROIDISM: ICD-10-CM

## 2021-01-25 DIAGNOSIS — Z23 NEED FOR SHINGLES VACCINE: ICD-10-CM

## 2021-01-25 DIAGNOSIS — R73.9 ELEVATED SERUM GLUCOSE: ICD-10-CM

## 2021-01-25 DIAGNOSIS — Z12.11 COLON CANCER SCREENING: ICD-10-CM

## 2021-01-25 DIAGNOSIS — Z00.00 ROUTINE GENERAL MEDICAL EXAMINATION AT A HEALTH CARE FACILITY: ICD-10-CM

## 2021-01-25 DIAGNOSIS — Z86.79 HISTORY OF ATRIAL FIBRILLATION: ICD-10-CM

## 2021-01-25 DIAGNOSIS — Z00.00 ROUTINE GENERAL MEDICAL EXAMINATION AT A HEALTH CARE FACILITY: Primary | ICD-10-CM

## 2021-01-25 LAB
ALBUMIN SERPL-MCNC: 4 G/DL (ref 3.5–5.2)
ALP BLD-CCNC: 82 U/L (ref 35–104)
ALT SERPL-CCNC: 20 U/L (ref 0–32)
ANION GAP SERPL CALCULATED.3IONS-SCNC: 10 MMOL/L (ref 7–16)
AST SERPL-CCNC: 19 U/L (ref 0–31)
BASOPHILS ABSOLUTE: 0.02 E9/L (ref 0–0.2)
BASOPHILS RELATIVE PERCENT: 0.3 % (ref 0–2)
BILIRUB SERPL-MCNC: 0.4 MG/DL (ref 0–1.2)
BUN BLDV-MCNC: 11 MG/DL (ref 8–23)
CALCIUM SERPL-MCNC: 8.9 MG/DL (ref 8.6–10.2)
CHLORIDE BLD-SCNC: 107 MMOL/L (ref 98–107)
CO2: 26 MMOL/L (ref 22–29)
CREAT SERPL-MCNC: 0.8 MG/DL (ref 0.5–1)
EOSINOPHILS ABSOLUTE: 0.29 E9/L (ref 0.05–0.5)
EOSINOPHILS RELATIVE PERCENT: 4.5 % (ref 0–6)
GFR AFRICAN AMERICAN: >60
GFR NON-AFRICAN AMERICAN: >60 ML/MIN/1.73
GLUCOSE BLD-MCNC: 93 MG/DL (ref 74–99)
HBA1C MFR BLD: 5.5 % (ref 4–5.6)
HCT VFR BLD CALC: 42.9 % (ref 34–48)
HEMOGLOBIN: 13.1 G/DL (ref 11.5–15.5)
IMMATURE GRANULOCYTES #: 0.02 E9/L
IMMATURE GRANULOCYTES %: 0.3 % (ref 0–5)
LYMPHOCYTES ABSOLUTE: 1.34 E9/L (ref 1.5–4)
LYMPHOCYTES RELATIVE PERCENT: 20.7 % (ref 20–42)
MCH RBC QN AUTO: 27.9 PG (ref 26–35)
MCHC RBC AUTO-ENTMCNC: 30.5 % (ref 32–34.5)
MCV RBC AUTO: 91.3 FL (ref 80–99.9)
MONOCYTES ABSOLUTE: 0.68 E9/L (ref 0.1–0.95)
MONOCYTES RELATIVE PERCENT: 10.5 % (ref 2–12)
NEUTROPHILS ABSOLUTE: 4.12 E9/L (ref 1.8–7.3)
NEUTROPHILS RELATIVE PERCENT: 63.7 % (ref 43–80)
PDW BLD-RTO: 13.9 FL (ref 11.5–15)
PLATELET # BLD: 216 E9/L (ref 130–450)
PMV BLD AUTO: 12.8 FL (ref 7–12)
POTASSIUM SERPL-SCNC: 4.5 MMOL/L (ref 3.5–5)
RBC # BLD: 4.7 E12/L (ref 3.5–5.5)
SODIUM BLD-SCNC: 143 MMOL/L (ref 132–146)
TOTAL PROTEIN: 7.2 G/DL (ref 6.4–8.3)
WBC # BLD: 6.5 E9/L (ref 4.5–11.5)

## 2021-01-25 PROCEDURE — G0438 PPPS, INITIAL VISIT: HCPCS | Performed by: FAMILY MEDICINE

## 2021-01-25 PROCEDURE — 36415 COLL VENOUS BLD VENIPUNCTURE: CPT | Performed by: FAMILY MEDICINE

## 2021-01-25 RX ORDER — METOPROLOL SUCCINATE 25 MG/1
25 TABLET, EXTENDED RELEASE ORAL 2 TIMES DAILY
Qty: 60 TABLET | Refills: 1 | Status: SHIPPED
Start: 2021-01-25 | End: 2021-07-06 | Stop reason: SDUPTHER

## 2021-01-25 ASSESSMENT — PATIENT HEALTH QUESTIONNAIRE - PHQ9
SUM OF ALL RESPONSES TO PHQ QUESTIONS 1-9: 0
SUM OF ALL RESPONSES TO PHQ9 QUESTIONS 1 & 2: 0
2. FEELING DOWN, DEPRESSED OR HOPELESS: 0

## 2021-01-25 NOTE — PATIENT INSTRUCTIONS
1. Colonoscopy   2. Mammogram   3. Shingles vaccine   4. 6 month blood work before appointment                 Personalized Preventive Plan for Mary Weathers - 1/25/2021  Medicare offers a range of preventive health benefits. Some of the tests and screenings are paid in full while other may be subject to a deductible, co-insurance, and/or copay. Some of these benefits include a comprehensive review of your medical history including lifestyle, illnesses that may run in your family, and various assessments and screenings as appropriate. After reviewing your medical record and screening and assessments performed today your provider may have ordered immunizations, labs, imaging, and/or referrals for you. A list of these orders (if applicable) as well as your Preventive Care list are included within your After Visit Summary for your review. Other Preventive Recommendations:    · A preventive eye exam performed by an eye specialist is recommended every 1-2 years to screen for glaucoma; cataracts, macular degeneration, and other eye disorders. · A preventive dental visit is recommended every 6 months. · Try to get at least 150 minutes of exercise per week or 10,000 steps per day on a pedometer . · Order or download the FREE \"Exercise & Physical Activity: Your Everyday Guide\" from [employer]. Call 9-182.299.2847 or search [employer] online. · You need 9548-3353 mg of calcium and 7336-2566 IU of vitamin D per day. It is possible to meet your calcium requirement with diet alone, but a vitamin D supplement is usually necessary to meet this goal.  · When exposed to the sun, use a sunscreen that protects against both UVA and UVB radiation with an SPF of 30 or greater. Reapply every 2 to 3 hours or after sweating, drying off with a towel, or swimming. · Always wear a seat belt when traveling in a car. Always wear a helmet when riding a bicycle or motorcycle.

## 2021-01-25 NOTE — PROGRESS NOTES
Medicare Annual Wellness Visit  Name: Sue Johnson Date: 2021   MRN: [id number] Sex: Female   Age: 78 y.o. Ethnicity: Declined   : 1950 Race: Bandar Booth is here for Medicare AWV    Screenings for behavioral, psychosocial and functional/safety risks, and cognitive dysfunction are all negative except as indicated below. These results, as well as other patient data from the [address] form, are documented in Flowsheets linked to this Encounter. No Known Allergies      Prior to Visit Medications    Medication Sig Taking?  Authorizing Provider   metoprolol succinate (TOPROL XL) 25 MG extended release tablet Take 1 tablet by mouth 2 times daily Yes Nya Tomlin DO   apixaban (ELIQUIS) 5 MG TABS tablet Take 1 tablet by mouth 2 times daily Yes Ashok Calloway MD   levothyroxine (SYNTHROID) 112 MCG tablet Take 1 tablet by mouth daily Yes Nya Tomlin DO   atorvastatin (LIPITOR) 40 MG tablet Take 1 tablet by mouth nightly Yes Nya Tomlin DO   polyethyl glycol-propyl glycol 0.4-0.3 % (SYSTANE) 0.4-0.3 % ophthalmic solution 2 drops as needed for Dry Eyes Yes Historical Provider, MD   Multiple Vitamins-Minerals (CENTRUM SILVER ULTRA WOMENS PO) Take by mouth Yes Historical Provider, MD   Calcium-Magnesium-Vitamin D (CALCIUM 1200+D3 PO) Take by mouth Yes Historical Provider, MD   Cholecalciferol (VITAMIN D3) 5000 units TABS Take by mouth daily  Yes Historical Provider, MD   Omega-3 Fatty Acids (FISH OIL TRIPLE STRENGTH) 1400 MG CAPS Take by mouth Yes Historical Provider, MD         Past Medical History:   Diagnosis Date    Hypothyroidism     Sleep apnea     On CPAP, wears nightly        Past Surgical History:   Procedure Laterality Date     SECTION      x2    COLONOSCOPY      HYSTERECTOMY, TOTAL ABDOMINAL      WISDOM TOOTH EXTRACTION           Family History   Problem Relation Age of Onset    Stroke Mother 35   [facility] Rheum Arthritis Mother     COPD Father lower leg: She exhibits no deformity. No edema. Left lower leg: She exhibits no deformity. No edema. Skin:     General: Skin is warm and dry. Findings: No rash. Neurological:      General: No focal deficit present. Mental Status: She is alert. Psychiatric:         Attention and Perception: Attention normal.         Mood and Affect: Mood normal.         Speech: Speech normal.         Behavior: Behavior is cooperative. Thought Content: Thought content normal.         Cognition and Memory: Cognition normal.         Judgment: Judgment normal.       Patient's complete Health Risk Assessment and screening values have been reviewed and are found in Flowsheets. The following problems were reviewed today and where indicated follow up appointments were made and/or referrals ordered. Positive Risk Factor Screenings with Interventions:            General Health and ACP:  General  In general, how would you say your health is?: Good  In the past 7 days, have you experienced any of the following?  New or Increased Pain, New or Increased Fatigue, Loneliness, Social Isolation, Stress or Anger?: (!) Stress  Do you get the social and emotional support that you need?: Yes  Do you have a Living Will?: (!) No  Advance Directives     Power of [address] [place] [address] Will ACP-Advance Directive ACP-Power of     Not on File Not on File Not on File Not on File      General Health Risk Interventions:  · No Living Will: Advance Care Planning addressed with patient today    Health Habits/Nutrition:  Health Habits/Nutrition  Do you exercise for at least 20 minutes 2-3 times per week?: (!) No  Have you lost any weight without trying in the past 3 months?: No  Do you eat fewer than 2 meals per day?: No  Have you seen a dentist within the past year?: Yes  Body mass index: (!) 46  Health Habits/Nutrition Interventions:  · Inadequate physical activity:  patient agrees to exercise for at least 150 minutes/week Personalized Preventive Plan   Current Health Maintenance Status  Immunization History   Administered Date(s) Administered    Influenza, High Dose (Fluzone 65 yrs and older) 11/29/2018    Influenza, Quadv, adjuvanted, 65 yrs +, IM, PF (Fluad) 12/23/2020    Pneumococcal Conjugate 13-valent ([id number]) 01/02/2019    Pneumococcal Polysaccharide ([id number]) 08/26/2020    Tdap (Boostrix, Adacel) 03/26/2019        Health Maintenance   Topic Date Due    Diabetes screen  10/20/1992    Shingles Vaccine (1 of 2) 10/20/2002    Annual Wellness Visit (AWV)  04/03/2019    Breast cancer screen  06/26/2021    Lipid screen  08/19/2021    Colon cancer screen colonoscopy  08/06/2025    DTaP/Tdap/Td vaccine (2 - Td) 03/26/2029    DEXA (modify frequency per FRAX score)  Completed    Flu vaccine  Completed    Pneumococcal 65+ years Vaccine  Completed    Hepatitis C screen  Completed    Hepatitis A vaccine  Aged Out    Hepatitis B vaccine  Aged Out    Hib vaccine  Aged Out    Meningococcal (ACWY) vaccine  Aged Out     Recommendations for [employer] Due: see orders and patient instructions/AVS.    Recommended screening schedule for the next 5-10 years is provided to the patient in written form: see Patient Alexia Birmingham was seen today for Medicare AWV. Diagnoses and all orders for this visit:    Routine general medical examination at a health care facility  -     CBC WITH AUTO DIFFERENTIAL; Future  -     COMPREHENSIVE METABOLIC PANEL; Future    Acquired hypothyroidism  Check labs before next visit in 6 months   -     TSH; Future  -     CBC WITH AUTO DIFFERENTIAL; Future  -     COMPREHENSIVE METABOLIC PANEL; Future  -     LIPID PANEL; Future    History of atrial fibrillation  Decrease Toprol XL 75 mg QD (50 mg AM and 25 mg PM) to Toprol XL 25 mg BID   -     metoprolol succinate (TOPROL XL) 25 MG extended release tablet;  Take 1 tablet by mouth 2 times daily    Elevated serum glucose  - HEMOGLOBIN A1C; Future    Need for shingles vaccine  -     Zoster recombinant [facility]); Future    Colon cancer screening  -     Charli Pandey MD, General Surgery, [place] [address]      Return in 6 months (on 7/25/2021) for hypothyroidism.        Electronically signed by Jac Maciel DO on 1/25/2021 at 12:57 PM

## 2021-01-25 NOTE — PROGRESS NOTES
Subjective:    Martha Ledesma is here for an AWV and she is doing well. She wanted to lower her medication for a-fib because she was feeling tired. She is up to date of vaccines and needs a mammogram and a colonscopy. ROS: Otherwise negative    Patient Active Problem List   Diagnosis    Vitamin D deficiency    Mixed hyperlipidemia    SOB (shortness of breath)    Paroxysmal atrial fibrillation (HCC)    Sleep apnea    Cellulitis       Past medical, surgical, family and social history were reviewed, non-contributory, and unchanged unless otherwise stated. Objective:    /85   Pulse 88   Temp 97.3 °F (36.3 °C) (Temporal)   Resp 16   Ht 5' 4\" (1.626 m)   Wt 268 lb (121.6 kg)   LMP  (LMP Unknown)   SpO2 97%   BMI 46.00 kg/m²     Exam is as noted by resident with the following changes, additions or corrections:      Assessment/Plan:        Martha Ledesma was seen today for medicare awv. Diagnoses and all orders for this visit:    Routine general medical examination at a health care facility  -     CBC WITH AUTO DIFFERENTIAL; Future  -     COMPREHENSIVE METABOLIC PANEL; Future    Acquired hypothyroidism  -     TSH; Future  -     CBC WITH AUTO DIFFERENTIAL; Future  -     COMPREHENSIVE METABOLIC PANEL; Future  -     LIPID PANEL; Future    History of atrial fibrillation  -     metoprolol succinate (TOPROL XL) 25 MG extended release tablet; Take 1 tablet by mouth 2 times daily    Elevated serum glucose  -     HEMOGLOBIN A1C; Future    Need for shingles vaccine  -     Zoster recombinant [facility]); Future    Colon cancer screening  -     Dorothea Tomlinson MD, General Surgery, [facility]           Attending Physician Statement    I have reviewed the chart, including any radiology or labs. I have discussed the case, including pertinent history and exam findings with the resident. I agree with the assessment, plan and orders as documented by the resident.   Please refer to the resident note for additional information.       Electronically signed by Ainsley Caballero DO on 1/25/2021 at 4:39 PM

## 2021-01-29 DIAGNOSIS — E78.2 MIXED HYPERLIPIDEMIA: ICD-10-CM

## 2021-01-29 RX ORDER — ATORVASTATIN CALCIUM 40 MG/1
40 TABLET, FILM COATED ORAL NIGHTLY
Qty: 90 TABLET | Refills: 1 | Status: SHIPPED
Start: 2021-01-29 | End: 2021-08-16 | Stop reason: SDUPTHER

## 2021-03-08 ENCOUNTER — OFFICE VISIT (OUTPATIENT)
Dept: SURGERY | Age: 71
End: 2021-03-08
Payer: MEDICARE

## 2021-03-08 VITALS
TEMPERATURE: 97.6 F | HEIGHT: 64 IN | BODY MASS INDEX: 44.39 KG/M2 | OXYGEN SATURATION: 96 % | DIASTOLIC BLOOD PRESSURE: 78 MMHG | HEART RATE: 64 BPM | RESPIRATION RATE: 18 BRPM | WEIGHT: 260 LBS | SYSTOLIC BLOOD PRESSURE: 124 MMHG

## 2021-03-08 DIAGNOSIS — Z80.0 FAMILY HISTORY OF COLON CANCER: Primary | ICD-10-CM

## 2021-03-08 DIAGNOSIS — Z86.010 HX OF COLONIC POLYPS: ICD-10-CM

## 2021-03-08 PROCEDURE — 99213 OFFICE O/P EST LOW 20 MIN: CPT | Performed by: SURGERY

## 2021-03-08 ASSESSMENT — ENCOUNTER SYMPTOMS
NAUSEA: 0
VOMITING: 0
BLOOD IN STOOL: 0
COUGH: 0
DIARRHEA: 0
ABDOMINAL PAIN: 0
SHORTNESS OF BREATH: 0
WHEEZING: 0
CONSTIPATION: 0
SORE THROAT: 0
PHOTOPHOBIA: 0
BACK PAIN: 0
EYE REDNESS: 0

## 2021-03-08 NOTE — PATIENT INSTRUCTIONS
ALESSAN[facility]O COLONOSCOPY PREPARATION    Instructions for Clear liquid diet  Definition  A clear liquid diet consists of clear liquids, such as water, broth and plain gelatin, that are easily digested and leave no undigested residue in your intestinal tract. Your doctor may prescribe a clear liquid diet before certain medical procedures or if you have certain digestive problems. Because a clear liquid diet can't provide you with adequate calories and nutrients, it shouldn't be continued for more than a few days. Purpose  A clear liquid diet is often used before tests, procedures or surgeries that require no food in your stomach or intestines, such as before colonoscopy. It may also be recommended as a short-term diet if you have certain digestive problems, such as nausea, vomiting or diarrhea, or after certain types of surgery. Diet details  A clear liquid diet helps maintain adequate hydration, provides some important electrolytes, such as sodium and potassium, and gives some energy at a time when a full diet isn't possible or recommended. The following foods are allowed in a clear liquid diet:    Plain water    Fruit juices without pulp, such as apple juice, white grape juice.  Strained lemonade    Clear, fat-free broth (bouillon or consomme)    Clear sodas     Plain gelatin (Not RED or PURPLE)   Honey    Ice pops without bits of fruit or fruit pulp    Tea or coffee without milk or cream   ** NOTHING RED OR PURPLE    Any foods not on the above list should be avoided. Also, for certain tests, such as colon exams, your doctor may ask you to avoid liquids or gelatin with red coloring.      A typical menu on the clear liquid diet may look like this:   Breakfast:  1 glass fruit juice  1 cup coffee or tea (without dairy products)  1 cup broth  1 bowl gelatin     Snack:  1 glass fruit juice  1 bowl gelatin     Lunch:  1 glass fruit juice  1 glass water  1 cup broth  1 bowl gelatin     Snack:  1 ice pop (without fruit pulp)  1 cup coffee or tea (without dairy products) or a soft drink     Dinner:  1 cup juice or water  1 cup broth  1 bowl gelatin  1 cup coffee or tea     Purchase this over the counter:  1. GATORADE/Clear liquid (64 ounces)   Pick these up at the pharmacy:   [address] [place] [address] 238 grams (over the counter only)    The DAY BEFORE your colonoscopy:   Drink only clear liquids. (Absolutely no solid food)    COLONOSCOPY PREP:  3 PM: Mix the entire bottle of MIRALAX into the 64 ounces of GATORADE. (Put half the bottle in each 32 ounce bottle). Shake the solution until fully dissolved. Drink an 8 ounce glass every 30 minutes until the solution is gone. **DO NOT EAT OR DRINK ANYTHING AFTER MIDNIGHT**          The DAY OF your colonoscopy: You may take any necessary medications with a sip of water. Bring along someone to take you home. REMEMBER: The preparation is very important. An adequate clean out allows for the best evaluation of your entire colon. During the prep, using baby wipes may ease some of your discomfort.     You should NOT plan on working or driving the rest of the day due to sedation given at the procedure

## 2021-03-08 NOTE — PROGRESS NOTES
Consult Note    Dear Eula Weber DO, thank you for referring Luis E San for evaluation. Reason for Consult: History of polyps    HISTORY OF PRESENT ILLNESS:    The patient is a 78 y.o. female who presents with need for colonoscopy. She has history of colon polyps. She also reports her siblings have history of colon polyps as does her father. She has a paternal grandfather history of colon carcinoma. She denies any diarrhea, constipation, or rectal bleeding. Past Medical History:   Diagnosis Date    Hypothyroidism     Sleep apnea     On CPAP, wears nightly        Past Surgical History:   Procedure Laterality Date     SECTION      x2    COLONOSCOPY      HYSTERECTOMY, TOTAL ABDOMINAL      WISDOM TOOTH EXTRACTION         Prior to Admission medications    Medication Sig Start Date End Date Taking?  Authorizing Provider   atorvastatin (LIPITOR) 40 MG tablet Take 1 tablet by mouth nightly 21  Yes Nya Tomlin DO   metoprolol succinate (TOPROL XL) 25 MG extended release tablet Take 1 tablet by mouth 2 times daily 21  Yes Kaden,    apixaban (ELIQUIS) 5 MG TABS tablet Take 1 tablet by mouth 2 times daily 12/15/20  Yes Oz Aguilar MD   levothyroxine (SYNTHROID) 112 MCG tablet Take 1 tablet by mouth daily 20  Yes Kaden,    polyethyl glycol-propyl glycol 0.4-0.3 % (SYSTANE) 0.4-0.3 % ophthalmic solution 2 drops as needed for Dry Eyes   Yes Historical Provider, MD   Multiple Vitamins-Minerals (CENTRUM SILVER ULTRA WOMENS PO) Take by mouth   Yes Historical Provider, MD   Calcium-Magnesium-Vitamin D (CALCIUM 1200+D3 PO) Take by mouth   Yes Historical Provider, MD   Cholecalciferol (VITAMIN D3) 5000 units TABS Take by mouth daily    Yes Historical Provider, MD   Omega-3 Fatty Acids (FISH OIL TRIPLE STRENGTH) 1400 MG CAPS Take by mouth   Yes Historical Provider, MD       Scheduled Meds:  ContinuousInfusions:  PRN Meds:    No Known Allergies    Social History     Socioeconomic History    Marital status:      Spouse name: Not on file    Number of children: Not on file    Years of education: Not on file    Highest education level: Not on file   Occupational History    Not on file   Social Needs    Financial resource strain: Not on file    Food insecurity     Worry: Not on file     Inability: Not on file    Transportation needs     Medical: Not on file     Non-medical: Not on file   Tobacco Use    Smoking status: Never Smoker    Smokeless tobacco: Never Used   Substance and Sexual Activity    Alcohol use: No    Drug use: No    Sexual activity: Not Currently   Lifestyle    Physical activity     Days per week: Not on file     Minutes per session: Not on file    Stress: Not on file   Relationships    Social connections     Talks on phone: Not on file     Gets together: Not on file     Attends Latter-day service: Not on file     Active member of club or organization: Not on file     Attends meetings of clubs or organizations: Not on file     Relationship status: Not on file    Intimate partner violence     Fear of current or ex partner: Not on file     Emotionally abused: Not on file     Physically abused: Not on file     Forced sexual activity: Not on file   Other Topics Concern    Not on file   Social History Narrative    Not on file       Family History   Problem Relation Age of Onset    Stroke Mother 35   Laverne Pheasant Rheum Arthritis Mother     COPD Father     Colon Polyps Father     Mult Sclerosis Father 48    Hypothyroidism Brother     Rheum Arthritis Brother     Psoriasis Brother     Arthritis Brother     COPD Brother     Other Brother         Venous stasis    High Blood Pressure Brother     Arthritis Brother     Glaucoma Brother     Hypothyroidism Maternal Grandmother     Deep Vein Thrombosis Maternal Grandmother     Heart Disease Maternal Grandfather     Atrial Fibrillation Paternal Grandmother     Hypothyroidism Paternal Grandmother     Cancer Paternal Grandfather        Review Of Systems:   Review of Systems   Constitutional: Negative for chills and fever. HENT: Negative for ear pain, nosebleeds, sore throat and tinnitus. Eyes: Negative for photophobia and redness. Respiratory: Negative for cough, shortness of breath and wheezing. Cardiovascular: Negative for chest pain and palpitations. Gastrointestinal: Negative for abdominal pain, blood in stool, constipation, diarrhea, nausea and vomiting. Endocrine: Negative for polydipsia. Genitourinary: Negative for dysuria, hematuria and urgency. Musculoskeletal: Negative for back pain and neck pain. Skin: Negative for rash. Neurological: Negative for dizziness, tremors and seizures. Hematological: Does not bruise/bleed easily. All other systems reviewed and are negative. Physical Exam:  Vitals:    03/08/21 0908   BP: 124/78   Pulse: 64   Resp: 18   Temp: 97.6 °F (36.4 °C)   TempSrc: Infrared   SpO2: 96%   Weight: 260 lb (117.9 kg)   Height: 5' 4\" (1.626 m)       General: Well nourished, well developed, no acute distress  Eyes:  PERRL   Conjunctiva unremarkable   ENT:  TM's intact bilaterally, no effusion   Nasal:  No mucosal edema     No nasal drainage   Oral:  mucosa moist and pink   Neck:  Supple   No palpable cervical lymphoadenopathy   Thyroid without mass or enlargement  Resp: Lungs CTAB   Equal and adequate air exchange without accessory muscle use   No rales, rhonchi or wheeze  CV: S1S2 RRR   No murmur   Intact distal pulses   No edema  GI: Abdomen Soft, non tender, non distended   Normoactive bowel sounds   No palpable hepatosplenomegaly  MS: Physiologic ROM of all extremities    Intact distal pulses   No clubbing or cyanosis   Skin Warm and dry; no rash or lesion  Neuro: Alert and oriented; normal and intact dtr's   Psych: Euthymic mood, congruent affect      No results found.      Assessment/Plan:  3 57-year-old female with history of polyps, family history of colon cancer and colon polyps. We will plan for colonoscopy. The risks and benefits of the procedure were explained to the patient, including risks of bleeding and perforation. The patient expressed understanding of these risks.         Electronically signed by Danny Marlow DO on 3/8/21 at 10:38 AM EST

## 2021-03-09 ENCOUNTER — TELEPHONE (OUTPATIENT)
Dept: SURGERY | Age: 71
End: 2021-03-09

## 2021-03-09 NOTE — TELEPHONE ENCOUNTER
Prior Authorization Form:      DEMOGRAPHICS:                     Patient Name:  Lawson Lilly  Patient :  1950            Insurance:  Payor: David French / Plan: [employer]in[employer] [place] PPO / Product Type: Medicare /   Insurance ID Number:    Payor/Plan Subscr  Sex Relation Sub. Ins. ID Effective Group Num   1.  [employer] 1950 Female Self [id number] 10/1/17 [id number]                                   [id number]         DIAGNOSIS & PROCEDURE:                       Procedure/Operation: cscope           CPT Code: 26341    Diagnosis:  Hx of polyps    ICD10 Code: z86.010    Location:  [facility]    Surgeon:  Oumar Gomez INFORMATION:                          Date:     Time: 800              Anesthesia:  MAC/TIVA                                                       Status:  Outpatient        Special Comments:         Electronically signed by Cynthia Slaughter MA on 3/9/2021 at 1:52 PM

## 2021-04-14 NOTE — TELEPHONE ENCOUNTER
Last Appointment   8/26/2020  Next Appointment  1/25/2021
Medication approved and sent to pharmacy.
Being able to communicate with parent, Not arguing

## 2021-06-07 NOTE — PROGRESS NOTES

## 2021-06-07 NOTE — PROGRESS NOTES
[facility] PRE-ADMISSION TESTING INSTRUCTIONS      ARRIVAL INSTRUCTIONS:  [x] Parking the day of Surgery is located in the Main Entrance lot. Upon entering the main door make an immediate right to the surgery reception desk. [x] Bring photo ID and insurance card    [] Bring in a copy of Living will or Durable Power of  papers. [x] Please be sure to arrange for responsible adult to provide transportation to and from the hospital    [x] Please arrange for responsible adult to be with you for the 24 hour period post procedure due to having anesthesia      GENERAL INSTRUCTIONS:    [x] Nothing by mouth after midnight, including gum, candy, mints or water    [x] You may brush your teeth, but do not swallow any water    [x] Take medications as instructed with 1-2 oz of water    [x] Stop herbal supplements and vitamins 5 days prior to procedure    [x] Follow preop dosing of blood thinners per physician instructions    [] Take 1/2 dose of evening insulin, but no insulin after midnight    [] No oral diabetic medications after midnight    [] If diabetic and have low blood sugar or feel symptomatic, take 1-2oz apple juice only    [] Bring inhalers day of surgery    [x] Bring C-PAP/ Bi-Pap day of surgery    [] Bring urine specimen day of surgery    [] Shower or bath with soap, lather and rinse well, AM of Surgery, no lotion, powders or creams to surgical site    [x] Follow bowel prep as instructed per surgeon    [] No tobacco products within 24 hours of surgery     [] No alcohol or illegal drug use within 24 hours of surgery.     [x] Jewelry, body piercing's, eyeglasses, contact lenses and dentures are not permitted into surgery (bring cases)      [x] Please do not wear any nail polish, make up or hair products on the day of surgery    [x] You can expect a call the business day prior to procedure to notify you if your arrival time changes    [x] If you receive a survey after surgery we would greatly appreciate your comments    [x] Please notify surgeon if you develop any illness between now and time of surgery (cold, cough, sore throat, fever, nausea, vomiting) or any signs of infections  including skin, wounds, and dental.

## 2021-06-08 ENCOUNTER — ANESTHESIA EVENT (OUTPATIENT)
Dept: ENDOSCOPY | Age: 71
End: 2021-06-08
Payer: MEDICARE

## 2021-06-09 ENCOUNTER — HOSPITAL ENCOUNTER (OUTPATIENT)
Age: 71
Setting detail: OUTPATIENT SURGERY
Discharge: HOME | End: 2021-06-09
Attending: SURGERY | Admitting: SURGERY
Payer: MEDICARE

## 2021-06-09 ENCOUNTER — ANESTHESIA (OUTPATIENT)
Dept: ENDOSCOPY | Age: 71
End: 2021-06-09
Payer: MEDICARE

## 2021-06-09 VITALS
OXYGEN SATURATION: 95 % | RESPIRATION RATE: 19 BRPM | DIASTOLIC BLOOD PRESSURE: 78 MMHG | SYSTOLIC BLOOD PRESSURE: 135 MMHG

## 2021-06-09 VITALS
DIASTOLIC BLOOD PRESSURE: 65 MMHG | WEIGHT: 250 LBS | HEIGHT: 64 IN | SYSTOLIC BLOOD PRESSURE: 121 MMHG | BODY MASS INDEX: 42.68 KG/M2 | OXYGEN SATURATION: 94 % | RESPIRATION RATE: 18 BRPM | TEMPERATURE: 97.9 F | HEART RATE: 88 BPM

## 2021-06-09 PROCEDURE — 7100000011 HC PHASE II RECOVERY - ADDTL 15 MIN: Performed by: SURGERY

## 2021-06-09 PROCEDURE — 7100000010 HC PHASE II RECOVERY - FIRST 15 MIN: Performed by: SURGERY

## 2021-06-09 PROCEDURE — 88305 TISSUE EXAM BY PATHOLOGIST: CPT

## 2021-06-09 PROCEDURE — 3609010300 HC COLONOSCOPY W/BIOPSY SINGLE/MULTIPLE: Performed by: SURGERY

## 2021-06-09 PROCEDURE — 2580000003 HC RX 258

## 2021-06-09 PROCEDURE — 3700000000 HC ANESTHESIA ATTENDED CARE: Performed by: SURGERY

## 2021-06-09 PROCEDURE — 45380 COLONOSCOPY AND BIOPSY: CPT | Performed by: SURGERY

## 2021-06-09 PROCEDURE — 2709999900 HC NON-CHARGEABLE SUPPLY: Performed by: SURGERY

## 2021-06-09 PROCEDURE — 6360000002 HC RX W HCPCS

## 2021-06-09 PROCEDURE — 3700000001 HC ADD 15 MINUTES (ANESTHESIA): Performed by: SURGERY

## 2021-06-09 RX ORDER — PROPOFOL 10 MG/ML
INJECTION, EMULSION INTRAVENOUS CONTINUOUS PRN
Status: DISCONTINUED | OUTPATIENT
Start: 2021-06-09 | End: 2021-06-09 | Stop reason: SDUPTHER

## 2021-06-09 RX ORDER — SODIUM CHLORIDE 9 MG/ML
INJECTION, SOLUTION INTRAVENOUS CONTINUOUS PRN
Status: DISCONTINUED | OUTPATIENT
Start: 2021-06-09 | End: 2021-06-09 | Stop reason: SDUPTHER

## 2021-06-09 RX ADMIN — SODIUM CHLORIDE: 9 INJECTION, SOLUTION INTRAVENOUS at 07:40

## 2021-06-09 RX ADMIN — PROPOFOL 225 MCG/KG/MIN: 10 INJECTION, EMULSION INTRAVENOUS at 08:00

## 2021-06-09 ASSESSMENT — LIFESTYLE VARIABLES: SMOKING_STATUS: 0

## 2021-06-09 ASSESSMENT — PAIN SCALES - GENERAL
PAINLEVEL_OUTOF10: 0

## 2021-06-09 ASSESSMENT — PAIN - FUNCTIONAL ASSESSMENT: PAIN_FUNCTIONAL_ASSESSMENT: 0-10

## 2021-06-09 ASSESSMENT — ENCOUNTER SYMPTOMS: SHORTNESS OF BREATH: 0

## 2021-06-09 NOTE — OP NOTE
Operative Note      Patient: Matilda Hernandez  YOB: 1950  MRN: [id number]    Date of Procedure: 6/9/2021    Pre-Op Diagnosis: HISTORY OF COLON POLYPS    Post-Op Diagnosis: Same, colon polyp       Procedure(s):  COLONOSCOPY WITH BIOPSY    Surgeon(s):  Rg Rae DO    Assistant:   * No surgical staff found *    Anesthesia: Monitor Anesthesia Care    Estimated Blood Loss (mL): Minimal    Complications: None    Specimens:   ID Type Source Tests Collected by Time Destination   A : bx polyp transverse Tissue Colon SURGICAL PATHOLOGY Rg Rae DO 6/9/2021 0808        Implants:  * No implants in log *      Drains: * No LDAs found *    Findings: Sessile polyp proximal transverse colon. Mild sigmoid diverticulosis. Detailed Description of Procedure:   Procedure form in the endoscopy suite are conscious sedation per by the anesthesia staff. Digital rectal exam is performed, there are no palpable rectal masses. The colonoscope was passed transanally into the rectum Vancil into the cecum was reached intubated. In the proximal transverse colon there is a small sessile polyp. This is removed with a biopsy forcep. There is mild sigmoid diverticulosis. Scope was then slowly withdrawn inspecting mucosal surface second time during withdrawal.  Bowel prep was excellent. No other polyps or mass lesions identified.   Scope was removed    Electronically signed by Rg Rae DO on 6/9/2021 at 8:18 AM

## 2021-06-09 NOTE — ANESTHESIA POSTPROCEDURE EVALUATION
Department of Anesthesiology  Postprocedure Note    Patient: Debra Becerril  MRN: [id number]  YOB: 1950  Date of evaluation: 6/9/2021  Time:  11:41 AM     Procedure Summary     Date: 06/09/21 Room / Location: [facility] / [facility]    Anesthesia Start: 5744 Anesthesia Stop: 1291    Procedure: COLONOSCOPY WITH BIOPSY (N/A ) Diagnosis: (HISTORY OF COLON POLYPS)    Surgeons: Fredy Huffman DO Responsible Provider: Shirley Patel MD    Anesthesia Type: MAC ASA Status: 4          Anesthesia Type: MAC    Munir Phase I: Munir Score: 10    Munir Phase II: Munir Score: 10    Last vitals: Reviewed and per EMR flowsheets.        Anesthesia Post Evaluation    Patient location during evaluation: PACU  Patient participation: complete - patient participated  Level of consciousness: awake and alert  Airway patency: patent  Nausea & Vomiting: no vomiting and no nausea  Complications: no  Cardiovascular status: blood pressure returned to baseline  Respiratory status: acceptable  Hydration status: euvolemic

## 2021-06-09 NOTE — ANESTHESIA PRE PROCEDURE
Department of Anesthesiology  Preprocedure Note       Name:  Louann Bird   Age:  78 y.o.  :  1950                                          MRN:  [id number]         Date:  2021      Surgeon: Jonny Georges):  Ari Grijalva DO    Procedure: Procedure(s):  COLONOSCOPY DIAGNOSTIC    Medications prior to admission:   Prior to Admission medications    Medication Sig Start Date End Date Taking? Authorizing Provider   atorvastatin (LIPITOR) 40 MG tablet Take 1 tablet by mouth nightly 21  Yes Nya Tomlin DO   metoprolol succinate (TOPROL XL) 25 MG extended release tablet Take 1 tablet by mouth 2 times daily 21  Yes Kaden, DO   apixaban (ELIQUIS) 5 MG TABS tablet Take 1 tablet by mouth 2 times daily 12/15/20  Yes Vasiliy Costa MD   levothyroxine (SYNTHROID) 112 MCG tablet Take 1 tablet by mouth daily 20  Yes Kaden, DO   polyethyl glycol-propyl glycol 0.4-0.3 % (SYSTANE) 0.4-0.3 % ophthalmic solution 2 drops as needed for Dry Eyes   Yes Historical Provider, MD   Multiple Vitamins-Minerals (CENTRUM SILVER ULTRA WOMENS PO) Take by mouth   Yes Historical Provider, MD   Calcium-Magnesium-Vitamin D (CALCIUM 1200+D3 PO) Take by mouth   Yes Historical Provider, MD   Cholecalciferol (VITAMIN D3) 5000 units TABS Take by mouth daily    Yes Historical Provider, MD   Omega-3 Fatty Acids (FISH OIL TRIPLE STRENGTH) 1400 MG CAPS Take by mouth   Yes Historical Provider, MD       Current medications:    No current facility-administered medications for this encounter.        Allergies:  No Known Allergies    Problem List:    Patient Active Problem List   Diagnosis Code    Vitamin D deficiency E55.9    Mixed hyperlipidemia E78.2    SOB (shortness of breath) R06.02    Paroxysmal atrial fibrillation (HCC) I48.0    Sleep apnea G47.30    Cellulitis L03.90       Past Medical History:        Diagnosis Date    A-fib [facility])     Colon cancer screening     Hyperlipidemia     Hypothyroidism Tarah Hagen, POCHCT in the last 72 hours. Coags: No results found for: PROTIME, INR, APTT    HCG (If Applicable): No results found for: PREGTESTUR, PREGSERUM, HCG, HCGQUANT     ABGs: No results found for: PHART, PO2ART, PON6OWL, JNY6IAJ, BEART, N2NEHBUH     Type & Screen (If Applicable):  No results found for: LABABO, LABRH    Drug/Infectious Status (If Applicable):  No results found for: HIV, HEPCAB    COVID-19 Screening (If Applicable): No results found for: COVID19            Echo 12/13/18   Left Ventricle   Normal left ventricle size and systolic function   Stage I diastolic dysfunction   No wall motion abnormalities   Ejection fraction is visually estimated at 65%. Right Ventricle   Normal right ventricle structure and function. Left Atrium   Normal sized left atrium. Right Atrium   Normal right atrium size. Mitral Valve   Mild mitral annular calcification   Trace mitral regurgitation   No mitral stenosis      Tricuspid Valve   Trace tricuspid regurgitation. RVSP is 32 mmHg. Aortic Valve   Focal calcification of the aortic valve   Trileaflet aortic valve   No hemodynamically significant aortic stenosis is present. No evidence of aortic valve regurgitation      Pulmonic Valve   The pulmonic valve was not well visualized. Pericardial Effusion   No evidence of pericardial effusion. Aorta   Aortic root dimension within normal limits. Miscellaneous   The inferior vena cava diameter is normal with normal respiratory   variation. Conclusions      Summary   Normal left ventricle size and systolic function   Stage I diastolic dysfunction   Trace mitral regurgitation        Myocardial Stress Test- Treadmill 1/3/2019     IMAGING: Myocardial perfusion imaging was performed at rest 30-35 minutes following the intravenous injection of 10.5 mCi of (Tc-Sestamibi) followed by 10 ml of Normal Saline.   At peak exercise, the patient was injected intravenously with 31.9 mCi of (Tc-Sestamibi) followed by 10 ml of Normal Saline. Gated post-stress tomographic imaging was performed 20-25 minutes after stress.      FINDINGS: The overall quality of the study was good.      Left ventricular cavity size was noted to be normal.     Rotational analog analysis demonstrated no patient motion or abnormal extracardiac radioactivity. There is soft tissue diaphragmatic attenuation.       The gated SPECT stress imaging in the short, vertical long, and horizontal long axis demonstrated normal homogeneous tracer distribution throughout the myocardium.     The resting images show no change.      Gated SPECT left ventricular ejection fraction was calculated to be >75% with hyperdynamic LV cavity, with normal myocardial thickening and wall motion. TID 0.62     Impression:    1. Exercise EKG was  negative. 2. The patient experienced no chest pain with exercise. 3. The myocardial perfusion imaging was normal.    4. Overall left ventricular systolic function was normal without regional wall motion abnormalities. 5. Kimbrough treadmill score was 3 implying intermediate risk. 6. Exercise capacity was below average. 7. There was deconditioned heart rate response to exercise. Heart rate recovery was abnormal. BP response to exercise was appropriate. 8.  Low risk general exercise treadmill test.        EKG 12/11/202  NSR   HR 90   Possible LA enlargment   Borderline EKG    Anesthesia Evaluation  Patient summary reviewed and Nursing notes reviewed no history of anesthetic complications:   Airway: Mallampati: III  TM distance: <3 FB   Neck ROM: full  Mouth opening: < 3 FB Dental:    (+) caps  Comment: Denies any loose missing or chipped teeth     Pulmonary: breath sounds clear to auscultation  (+) sleep apnea (wears nightly ): on CPAP,      (-) shortness of breath and not a current smoker                           Cardiovascular:  Exercise tolerance: good (>4 METS),   (+) hypertension: mild, valvular problems/murmurs (trace MR and TR): MR, dysrhythmias: atrial fibrillation and atrial flutter, CHF (stage 1 diastolic dysfunction): diastolic, hyperlipidemia      ECG reviewed  Rhythm: regular  Rate: normal  Echocardiogram reviewed  Stress test reviewed       Beta Blocker:  Dose within 24 Hrs         Neuro/Psych:   Negative Neuro/Psych ROS              GI/Hepatic/Renal:   (+) bowel prep, morbid obesity         ROS comment: History of polyps. Endo/Other:    (+) hypothyroidism, blood dyscrasia (eloquis- has not taken in 2 days ): anticoagulation therapy:., electrolyte abnormalities (vit D deficient ), . Abdominal:   (+) obese,         Vascular: negative vascular ROS. Anesthesia Plan      MAC     ASA 4     (PIV 22 Right Hand)  Induction: intravenous. Anesthetic plan and risks discussed with patient. Plan discussed with CRNA and attending.                 Stefani Sarmiento RN   6/9/2021

## 2021-06-09 NOTE — H&P
HISTORY OF PRESENT ILLNESS:    The patient is a 78 y.o. female who presents with need for colonoscopy. She has history of colon polyps. She also reports her siblings have history of colon polyps as does her father. She has a paternal grandfather history of colon carcinoma. She denies any diarrhea, constipation, or rectal bleeding. Past Medical History        Past Medical History:   Diagnosis Date    Hypothyroidism      Sleep apnea       On CPAP, wears nightly             Past Surgical History         Past Surgical History:   Procedure Laterality Date     SECTION         x2    COLONOSCOPY        HYSTERECTOMY, TOTAL ABDOMINAL        WISDOM TOOTH EXTRACTION                Home Medications           Prior to Admission medications    Medication Sig Start Date End Date Taking?  Authorizing Provider   atorvastatin (LIPITOR) 40 MG tablet Take 1 tablet by mouth nightly 21   Yes Harman Choudhury DO   metoprolol succinate (TOPROL XL) 25 MG extended release tablet Take 1 tablet by mouth 2 times daily 21   Yes Harman Choudhury DO   apixaban (ELIQUIS) 5 MG TABS tablet Take 1 tablet by mouth 2 times daily 12/15/20   Yes Nicole Tobias MD   levothyroxine (SYNTHROID) 112 MCG tablet Take 1 tablet by mouth daily 20   Yes Harman Choudhury DO   polyethyl glycol-propyl glycol 0.4-0.3 % (SYSTANE) 0.4-0.3 % ophthalmic solution 2 drops as needed for Dry Eyes     Yes Historical Provider, MD   Multiple Vitamins-Minerals (CENTRUM SILVER ULTRA WOMENS PO) Take by mouth     Yes Historical Provider, MD   Calcium-Magnesium-Vitamin D (CALCIUM 1200+D3 PO) Take by mouth     Yes Historical Provider, MD   Cholecalciferol (VITAMIN D3) 5000 units TABS Take by mouth daily      Yes Historical Provider, MD   Omega-3 Fatty Acids (FISH OIL TRIPLE STRENGTH) 1400 MG CAPS Take by mouth     Yes Historical Provider, MD            Scheduled Meds:  ContinuousInfusions:  PRN Meds:     No Known Allergies     Social History          Socioeconomic History    Marital status:        Spouse name: Not on file    Number of children: Not on file    Years of education: Not on file    Highest education level: Not on file   Occupational History    Not on file   Social Needs    Financial resource strain: Not on file    Food insecurity       Worry: Not on file       Inability: Not on file    Transportation needs       Medical: Not on file       Non-medical: Not on file   Tobacco Use    Smoking status: Never Smoker    Smokeless tobacco: Never Used   Substance and Sexual Activity    Alcohol use: No    Drug use: No    Sexual activity: Not Currently   Lifestyle    Physical activity       Days per week: Not on file       Minutes per session: Not on file    Stress: Not on file   Relationships    Social connections       Talks on phone: Not on file       Gets together: Not on file       Attends Latter-day service: Not on file       Active member of club or organization: Not on file       Attends meetings of clubs or organizations: Not on file       Relationship status: Not on file    Intimate partner violence       Fear of current or ex partner: Not on file       Emotionally abused: Not on file       Physically abused: Not on file       Forced sexual activity: Not on file   Other Topics Concern    Not on file   Social History Narrative    Not on file            Family History         Family History   Problem Relation Age of Onset    Stroke Mother 35   [employer] Rheum Arthritis Mother      COPD Father      Colon Polyps Father      Mult Sclerosis Father 48    Hypothyroidism Brother      Rheum Arthritis Brother      Psoriasis Brother      Arthritis Brother      COPD Brother      Other Brother           Venous stasis    High Blood Pressure Brother      Arthritis Brother      Glaucoma Brother      Hypothyroidism Maternal Grandmother      Deep Vein Thrombosis Maternal Grandmother      Heart Disease Maternal Grandfather    Atrial Fibrillation Paternal Grandmother      Hypothyroidism Paternal Grandmother      Cancer Paternal Grandfather              Review Of Systems:   Review of Systems   Constitutional: Negative for chills and fever. HENT: Negative for ear pain, nosebleeds, sore throat and tinnitus. Eyes: Negative for photophobia and redness. Respiratory: Negative for cough, shortness of breath and wheezing. Cardiovascular: Negative for chest pain and palpitations. Gastrointestinal: Negative for abdominal pain, blood in stool, constipation, diarrhea, nausea and vomiting. Endocrine: Negative for polydipsia. Genitourinary: Negative for dysuria, hematuria and urgency. Musculoskeletal: Negative for back pain and neck pain. Skin: Negative for rash. Neurological: Negative for dizziness, tremors and seizures. Hematological: Does not bruise/bleed easily.    All other systems reviewed and are negative.        Physical Exam:  Vitals       Vitals:     03/08/21 0908   BP: 124/78   Pulse: 64   Resp: 18   Temp: 97.6 °F (36.4 °C)   TempSrc: Infrared   SpO2: 96%   Weight: 260 lb (117.9 kg)   Height: 5' 4\" (1.626 m)            General: Well nourished, well developed, no acute distress  Eyes:   PERRL              Conjunctiva unremarkable   ENT:    TM's intact bilaterally, no effusion              Nasal:  No mucosal edema                           No nasal drainage              Oral:    mucosa moist and pink   Neck:   Supple              No palpable cervical lymphoadenopathy              Thyroid without mass or enlargement  Resp:   Lungs CTAB              Equal and adequate air exchange without accessory muscle use              No rales, rhonchi or wheeze  CV:      S1S2 RRR              No murmur              Intact distal pulses              No edema  GI:       Abdomen Soft, non tender, non distended              Normoactive bowel sounds              No palpable hepatosplenomegaly  MS:      Physiologic ROM of all

## 2021-06-10 RX ORDER — APIXABAN 5 MG/1
TABLET, FILM COATED ORAL
Qty: 180 TABLET | Refills: 1 | Status: SHIPPED
Start: 2021-06-10 | End: 2021-11-02

## 2021-06-11 PROBLEM — I48.91 A-FIB (HCC): Status: ACTIVE | Noted: 2019-02-13

## 2021-06-11 PROBLEM — E78.5 HYPERLIPIDEMIA: Status: ACTIVE | Noted: 2018-11-29

## 2021-06-11 PROBLEM — K57.90 DIVERTICULOSIS: Status: ACTIVE | Noted: 2021-06-09

## 2021-07-06 DIAGNOSIS — Z86.79 HISTORY OF ATRIAL FIBRILLATION: ICD-10-CM

## 2021-07-06 RX ORDER — METOPROLOL SUCCINATE 25 MG/1
25 TABLET, EXTENDED RELEASE ORAL 2 TIMES DAILY
Qty: 60 TABLET | Refills: 1 | Status: SHIPPED
Start: 2021-07-06 | End: 2021-08-16 | Stop reason: SDUPTHER

## 2021-07-28 ENCOUNTER — OFFICE VISIT (OUTPATIENT)
Dept: SURGERY | Age: 71
End: 2021-07-28
Payer: MEDICARE

## 2021-07-28 VITALS
TEMPERATURE: 97.4 F | OXYGEN SATURATION: 99 % | BODY MASS INDEX: 45.31 KG/M2 | SYSTOLIC BLOOD PRESSURE: 152 MMHG | HEIGHT: 64 IN | DIASTOLIC BLOOD PRESSURE: 91 MMHG | HEART RATE: 60 BPM | WEIGHT: 265.4 LBS

## 2021-07-28 DIAGNOSIS — L73.9 FOLLICULITIS: ICD-10-CM

## 2021-07-28 DIAGNOSIS — D12.6 ADENOMATOUS POLYP OF COLON, UNSPECIFIED PART OF COLON: Primary | ICD-10-CM

## 2021-07-28 PROCEDURE — 99213 OFFICE O/P EST LOW 20 MIN: CPT | Performed by: SURGERY

## 2021-07-28 RX ORDER — MUPIROCIN CALCIUM 20 MG/G
CREAM TOPICAL
Qty: 1 TUBE | Refills: 0 | Status: SHIPPED | OUTPATIENT
Start: 2021-07-28 | End: 2021-08-27

## 2021-07-28 NOTE — PROGRESS NOTES
Patient presents for follow-up to recent colonoscopy with polypectomy. She has no complaints. She is noting that she is developing a infected \"pimple \"in her left armpit. She has history of cellulitis in that area in the past. On exam abdomen soft nontender. In the left anterior axilla there is a single area of folliculitis which is erythematous and raised. There is no purulence or significant fluctuance. Pathology is reviewed which is consistent with tubular adenoma. Next colonoscopy should be in 5 years or sooner if alarm type symptoms develop. I prescribed Bactroban ointment for her folliculitis. She is welcome to follow-up as needed.

## 2021-08-16 ENCOUNTER — OFFICE VISIT (OUTPATIENT)
Dept: FAMILY MEDICINE CLINIC | Age: 71
End: 2021-08-16
Payer: MEDICARE

## 2021-08-16 VITALS
OXYGEN SATURATION: 99 % | SYSTOLIC BLOOD PRESSURE: 150 MMHG | HEIGHT: 64 IN | RESPIRATION RATE: 18 BRPM | TEMPERATURE: 97.7 F | BODY MASS INDEX: 45.41 KG/M2 | HEART RATE: 64 BPM | DIASTOLIC BLOOD PRESSURE: 86 MMHG | WEIGHT: 266 LBS

## 2021-08-16 DIAGNOSIS — Z12.31 ENCOUNTER FOR SCREENING MAMMOGRAM FOR MALIGNANT NEOPLASM OF BREAST: ICD-10-CM

## 2021-08-16 DIAGNOSIS — Z23 NEED FOR SHINGLES VACCINE: ICD-10-CM

## 2021-08-16 DIAGNOSIS — I48.0 PAROXYSMAL ATRIAL FIBRILLATION (HCC): ICD-10-CM

## 2021-08-16 DIAGNOSIS — Z78.0 POSTMENOPAUSE: ICD-10-CM

## 2021-08-16 DIAGNOSIS — E78.2 MIXED HYPERLIPIDEMIA: ICD-10-CM

## 2021-08-16 DIAGNOSIS — E03.9 ACQUIRED HYPOTHYROIDISM: ICD-10-CM

## 2021-08-16 DIAGNOSIS — E78.1 PURE HYPERTRIGLYCERIDEMIA: Primary | ICD-10-CM

## 2021-08-16 DIAGNOSIS — Z12.83 SKIN EXAM, SCREENING FOR CANCER: ICD-10-CM

## 2021-08-16 DIAGNOSIS — R03.0 ELEVATED BLOOD PRESSURE READING: ICD-10-CM

## 2021-08-16 PROCEDURE — 99214 OFFICE O/P EST MOD 30 MIN: CPT | Performed by: FAMILY MEDICINE

## 2021-08-16 RX ORDER — ATORVASTATIN CALCIUM 40 MG/1
40 TABLET, FILM COATED ORAL NIGHTLY
Qty: 90 TABLET | Refills: 1 | Status: SHIPPED
Start: 2021-08-16 | End: 2022-04-06 | Stop reason: SDUPTHER

## 2021-08-16 RX ORDER — METOPROLOL SUCCINATE 25 MG/1
25 TABLET, EXTENDED RELEASE ORAL 2 TIMES DAILY
Qty: 180 TABLET | Refills: 1 | Status: SHIPPED
Start: 2021-08-16 | End: 2022-01-25

## 2021-08-16 RX ORDER — LEVOTHYROXINE SODIUM 112 UG/1
112 TABLET ORAL DAILY
Qty: 90 TABLET | Refills: 1 | Status: SHIPPED
Start: 2021-08-16 | End: 2022-04-06 | Stop reason: SDUPTHER

## 2021-08-16 RX ORDER — ADHESIVE BANDAGE 3/4"
BANDAGE TOPICAL
Qty: 1 EACH | Refills: 0 | Status: SHIPPED | OUTPATIENT
Start: 2021-08-16 | End: 2021-08-30 | Stop reason: CLARIF

## 2021-08-16 RX ORDER — ADHESIVE BANDAGE 3/4"
BANDAGE TOPICAL
Qty: 1 EACH | Refills: 0 | Status: SHIPPED
Start: 2021-08-16 | End: 2021-08-16

## 2021-08-16 SDOH — ECONOMIC STABILITY: INCOME INSECURITY: IN THE LAST 12 MONTHS, WAS THERE A TIME WHEN YOU WERE NOT ABLE TO PAY THE MORTGAGE OR RENT ON TIME?: NO

## 2021-08-16 SDOH — ECONOMIC STABILITY: FOOD INSECURITY: WITHIN THE PAST 12 MONTHS, YOU WORRIED THAT YOUR FOOD WOULD RUN OUT BEFORE YOU GOT MONEY TO BUY MORE.: NEVER TRUE

## 2021-08-16 SDOH — ECONOMIC STABILITY: HOUSING INSECURITY
IN THE LAST 12 MONTHS, WAS THERE A TIME WHEN YOU DID NOT HAVE A STEADY PLACE TO SLEEP OR SLEPT IN A SHELTER (INCLUDING NOW)?: NO

## 2021-08-16 SDOH — ECONOMIC STABILITY: FOOD INSECURITY: WITHIN THE PAST 12 MONTHS, THE FOOD YOU BOUGHT JUST DIDN'T LAST AND YOU DIDN'T HAVE MONEY TO GET MORE.: NEVER TRUE

## 2021-08-16 SDOH — ECONOMIC STABILITY: HOUSING INSECURITY: IN THE LAST 12 MONTHS, HOW MANY PLACES HAVE YOU LIVED?: 1

## 2021-08-16 ASSESSMENT — ENCOUNTER SYMPTOMS
COUGH: 0
COLOR CHANGE: 0
NAUSEA: 0
VOMITING: 0
WHEEZING: 0
CONSTIPATION: 0
SHORTNESS OF BREATH: 0
EYE PAIN: 0

## 2021-08-16 ASSESSMENT — SOCIAL DETERMINANTS OF HEALTH (SDOH): HOW HARD IS IT FOR YOU TO PAY FOR THE VERY BASICS LIKE FOOD, HOUSING, MEDICAL CARE, AND HEATING?: NOT HARD AT ALL

## 2021-08-16 NOTE — PROGRESS NOTES
8/16/2021    Duane Salles is a 78 y.o. female here for:    Chief Complaint   Patient presents with    Hyperlipidemia     med check    Hypothyroidism    Atrial Fibrillation        HPI:  HLD   - On Lipitor 40 mg HS. No side effects on medication.  - Diet: meat, potatoes, and vegetables   - Exercise regimen: none  The 10-year ASCVD risk score (Susu Walls, et al., 2013) is: 10.1%    Values used to calculate the score:      Age: 78 years      Sex: Female      Is Non- : No      Diabetic: No      Tobacco smoker: No      Systolic Blood Pressure: 226 mmHg      Is BP treated: No      HDL Cholesterol: 45 mg/dL      Total Cholesterol: 164 mg/dL     Hypothyroidism  - On levothyroxine 112 mcg QD. No side effects on medication.   - Denies constipation, weight changes, dry skin, and hair loss     Atrial fibrillation   - On metoprolol succinate 25 mg BID. Fatigue has improved on lower dose of B-blocker. - On Eliquis 5 mg BID. No blood in stool, vaginal bleeding, or nosebleeds. - Has upcoming Cardiology appointment     Current Outpatient Medications   Medication Sig Dispense Refill    levothyroxine (SYNTHROID) 112 MCG tablet Take 1 tablet by mouth daily 90 tablet 1    metoprolol succinate (TOPROL XL) 25 MG extended release tablet Take 1 tablet by mouth 2 times daily 180 tablet 1    Blood Pressure Monitoring (BLOOD PRESSURE CUFF) MISC Check blood pressure daily 1 each 0    mupirocin (BACTROBAN) 2 % cream Apply 3 times daily.  1 Tube 0    ELIQUIS 5 MG TABS tablet TAKE 1 TABLET TWICE A  tablet 1    atorvastatin (LIPITOR) 40 MG tablet Take 1 tablet by mouth nightly 90 tablet 1    polyethyl glycol-propyl glycol 0.4-0.3 % (SYSTANE) 0.4-0.3 % ophthalmic solution 2 drops as needed for Dry Eyes      Multiple Vitamins-Minerals (CENTRUM SILVER ULTRA WOMENS PO) Take by mouth      Calcium-Magnesium-Vitamin D (CALCIUM 1200+D3 PO) Take by mouth      Cholecalciferol (VITAMIN D3) 5000 units TABS Take by mouth daily       Omega-3 Fatty Acids (FISH OIL TRIPLE STRENGTH) 1400 MG CAPS Take by mouth       No current facility-administered medications for this visit. No Known Allergies    Past Medical & Surgical History:      Diagnosis Date    [facility]fib [facility])     Diverticulosis 2021    on colonoscopy    Hyperlipidemia     Hypothyroidism     Sleep apnea     On CPAP, wears nightly     Tubular adenoma of colon 2021    on colonoscopy     Past Surgical History:   Procedure Laterality Date     SECTION N/A     x 2    COLONOSCOPY N/A 2021    COLONOSCOPY WITH BIOPSY performed by Bijan Vasquez DO at [address] [place] [address] Drive, TOTAL ABDOMINAL      WISDOM TOOTH EXTRACTION         Family History:      Problem Relation Age of Onset    Stroke Mother 35   Leann Solum Rheum Arthritis Mother     COPD Father     Colon Polyps Father     Mult Sclerosis Father 48    Hypothyroidism Brother     Rheum Arthritis Brother     Psoriasis Brother     Arthritis Brother     COPD Brother     Other Brother         Venous stasis    High Blood Pressure Brother     Arthritis Brother     Glaucoma Brother     Hypothyroidism Maternal Grandmother     Deep Vein Thrombosis Maternal Grandmother     Heart Disease Maternal Grandfather     Atrial Fibrillation Paternal Grandmother     Hypothyroidism Paternal Grandmother     Cancer Paternal Grandfather        Social History:  Social History     Tobacco Use    Smoking status: Never Smoker    Smokeless tobacco: Never Used   Substance Use Topics    Alcohol use: No       Review of Systems   Constitutional: Negative for appetite change, chills, fever and unexpected weight change. Waking up hot at night since menopause (not soaking pajamas or bedsheets)   Eyes: Negative for pain and visual disturbance. Respiratory: Negative for cough, shortness of breath and wheezing. Cardiovascular: Negative for chest pain and palpitations.    Gastrointestinal: Negative for constipation, nausea and vomiting. Genitourinary: Negative for dysuria, hematuria and vaginal bleeding. Skin: Negative for color change and rash. Neurological: Negative for dizziness, syncope and light-headedness. Hematological: Does not bruise/bleed easily. Psychiatric/Behavioral: Negative for dysphoric mood and suicidal ideas. The patient is nervous/anxious. BP Readings from Last 3 Encounters:   08/16/21 (!) 150/86   07/28/21 (!) 152/91   06/09/21 135/78       VS:  BP (!) 150/86   Pulse 64   Temp 97.7 °F (36.5 °C)   Resp 18   Ht 5' 4\" (1.626 m)   Wt 266 lb (120.7 kg)   LMP  (LMP Unknown)   SpO2 99%   BMI 45.66 kg/m²     Physical Exam  Vitals reviewed. Constitutional:       General: She is awake. She is not in acute distress. Appearance: She is well-developed and well-groomed. She is morbidly obese. She is not ill-appearing, toxic-appearing or diaphoretic. Cardiovascular:      Rate and Rhythm: Normal rate. Rhythm irregularly irregular. Pulmonary:      Breath sounds: No decreased breath sounds, wheezing, rhonchi or rales. Abdominal:      General: Bowel sounds are normal. There is no distension. Palpations: Abdomen is soft. Tenderness: There is no abdominal tenderness. There is no guarding or rebound. Musculoskeletal:      Right lower leg: No edema. Left lower leg: No edema. Skin:     General: Skin is warm and dry. Findings: No rash. Neurological:      General: No focal deficit present. Mental Status: She is alert. Psychiatric:         Attention and Perception: Attention normal.         Mood and Affect: Mood normal.         Speech: Speech normal.         Behavior: Behavior normal. Behavior is cooperative. Thought Content: Thought content normal.         Cognition and Memory: Cognition normal.         Judgment: Judgment normal.         Assessment/Plan:  1.  Pure hypertriglyceridemia  Lipid panel from 08/13/2021 reviewed with patient. TGs still elevated. LDL within normal range. Continue Lipitor 40 mg HS. 2. Acquired hypothyroidism  TSH from 08/13/2021 reviewed with patient. Well-controlled. Continue Synthroid 112 mcg QD.   - levothyroxine (SYNTHROID) 112 MCG tablet; Take 1 tablet by mouth daily  Dispense: 90 tablet; Refill: 1    3. Paroxysmal atrial fibrillation (HCC)  Stable. Continue Toprol XL 25 mg BID. - metoprolol succinate (TOPROL XL) 25 MG extended release tablet; Take 1 tablet by mouth 2 times daily  Dispense: 180 tablet; Refill: 1    4. Elevated blood pressure reading  BP elevated today. Patient on B-blocker only. Prescribed BP cuff. Patient to check BP readings 3X weekly at home and bring readings with her to next visit with me in 1 month. - Blood Pressure Monitoring (BLOOD PRESSURE CUFF) MISC; Check blood pressure daily  Dispense: 1 each; Refill: 0    5. Skin exam, screening for cancer  - Eric Smiley D.O, Dermatology, [place] ([place])    6. Encounter for screening mammogram for malignant neoplasm of breast  - OPAL DIGITAL SCREEN BILATERAL PER PROTOCOL; Future    7. Postmenopause  - DEXA AXIAL SKELETON W VERTEBRAL FX ASST; Future    8. Need for shingles vaccine  - Zoster recombinant [facility]); Future        Return in about 1 month (around 9/16/2021) for discuss night sweats, elevated BP .       Elvia Bennett DO  Family Medicine

## 2021-08-16 NOTE — PATIENT INSTRUCTIONS
- Schedule mammogram  - Get shingles vaccine   - Schedule appointment with Dr. Robyn Plunkett   - Schedule appointment with Dermatology, Dr. Nancy Martinez   - Get bone density scan

## 2021-08-30 ENCOUNTER — OFFICE VISIT (OUTPATIENT)
Dept: CARDIOLOGY CLINIC | Age: 71
End: 2021-08-30
Payer: MEDICARE

## 2021-08-30 VITALS
RESPIRATION RATE: 14 BRPM | HEART RATE: 70 BPM | WEIGHT: 265 LBS | DIASTOLIC BLOOD PRESSURE: 66 MMHG | BODY MASS INDEX: 45.24 KG/M2 | HEIGHT: 64 IN | SYSTOLIC BLOOD PRESSURE: 138 MMHG

## 2021-08-30 DIAGNOSIS — I48.91 ATRIAL FIBRILLATION, UNSPECIFIED TYPE (HCC): Primary | ICD-10-CM

## 2021-08-30 PROBLEM — L03.90 CELLULITIS: Status: RESOLVED | Noted: 2020-12-07 | Resolved: 2021-08-30

## 2021-08-30 PROCEDURE — 99213 OFFICE O/P EST LOW 20 MIN: CPT | Performed by: INTERNAL MEDICINE

## 2021-08-30 PROCEDURE — 93000 ELECTROCARDIOGRAM COMPLETE: CPT | Performed by: INTERNAL MEDICINE

## 2021-08-30 NOTE — PROGRESS NOTES
NO  dyspnea and fatigue and denies   chest pain and paroxysmal nocturnal dyspnea. No hx cva, dm,htn,bleeds, afib. Not exercising. Is using her CPAP. No alcohol. No neuro symptoms  Has lost 6 lbs since I last saw her. Was in hospital 12/2010 for cellulitis - had period of AF ( I reviewed strips and agree), with spontaneous conversion to sinus, and BB made bid dosing. Compliant with NOAC        Review of Systems:   Heart: as above   Lungs: as above   Eyes: denies changes in vision or discharge. Ears: denies changes in hearing or pain. Nose: denies epistaxis or masses   Throat: denies sore throat or trouble swallowing. Neuro: denies numbness, tingling, tremors. Skin: denies rashes or itching. : denies hematuria, dysuria   GI: denies vomiting, diarrhea   Psych: denies mood changed, anxiety, depression. all others negative. Physical Exam   /66   Pulse 70   Resp 14   Ht 5' 4\" (1.626 m)   Wt 265 lb (120.2 kg)   LMP  (LMP Unknown)   BMI 45.49 kg/m²   Constitutional: Oriented to person, place, and time. Obese No distress. Head: Normocephalic and atraumatic. Eyes: EOM are normal. Pupils are equal, round, and reactive to light. Neck: Normal range of motion. Neck supple. No hepatojugular reflux and no JVD present. Carotid bruit is not present. No tracheal deviation present. No thyromegaly present. Cardiovascular: normal rate, regular rhythm, normal heart sounds and intact distal pulses. Exam reveals no gallop and no friction rub. No murmur heard. Pulmonary/Chest: Effort normal and breath sounds normal. No respiratory distress. No wheezes. No rales. No tenderness. Abdominal: Soft. Bowel sounds are normal. No distension and no mass. No tenderness. No rebound and no guarding. Musculoskeletal: Normal range of motion. No edema and no tenderness. Neurological: Alert and oriented to person, place, and time. Skin: Skin is warm and dry. No rash noted. Not diaphoretic. No erythema. Psychiatric: Normal mood and affect. Behavior is normal.     EKG:  Nsr with PACs , rate 70, axis +39,  normal    ASSESSMENT AND PLAN:  Patient Active Problem List   Diagnosis        Hyperlipidemia    SOB (shortness of breath)    Atrial fibrillation    Sleep apnea        Hypothyroidism         Paroxysmal afib, non valvular, chads - vasc = 2.  in sinus today on BB and NOAC  On CPAP, no substance abuse or alcohol  Discussed swimming for exercise  Encouraged continued weight loss  Advised to discontinue fish oil for potential (low) of AF propagation, and bleeding risk      The patient was educated as to the symptoms that would require a prompt return to our office.   Return visit in 1 year    Miles Aj M.D  [facility] Card[facility]

## 2021-09-16 ENCOUNTER — OFFICE VISIT (OUTPATIENT)
Dept: FAMILY MEDICINE CLINIC | Age: 71
End: 2021-09-16
Payer: MEDICARE

## 2021-09-16 VITALS
HEIGHT: 64 IN | OXYGEN SATURATION: 95 % | BODY MASS INDEX: 44.86 KG/M2 | WEIGHT: 262.8 LBS | HEART RATE: 54 BPM | RESPIRATION RATE: 16 BRPM | DIASTOLIC BLOOD PRESSURE: 88 MMHG | TEMPERATURE: 97.3 F | SYSTOLIC BLOOD PRESSURE: 140 MMHG

## 2021-09-16 DIAGNOSIS — Z23 FLU VACCINE NEED: ICD-10-CM

## 2021-09-16 DIAGNOSIS — R61 NIGHT SWEATS: ICD-10-CM

## 2021-09-16 DIAGNOSIS — I10 ESSENTIAL HYPERTENSION: Primary | ICD-10-CM

## 2021-09-16 PROCEDURE — G0008 ADMIN INFLUENZA VIRUS VAC: HCPCS | Performed by: FAMILY MEDICINE

## 2021-09-16 PROCEDURE — 99213 OFFICE O/P EST LOW 20 MIN: CPT | Performed by: FAMILY MEDICINE

## 2021-09-16 PROCEDURE — 90694 VACC AIIV4 NO PRSRV 0.5ML IM: CPT | Performed by: FAMILY MEDICINE

## 2021-09-16 RX ORDER — AMLODIPINE BESYLATE 5 MG/1
2.5 TABLET ORAL DAILY
Qty: 15 TABLET | Refills: 0 | Status: SHIPPED
Start: 2021-09-16 | End: 2021-09-16

## 2021-09-16 RX ORDER — AMLODIPINE BESYLATE 5 MG/1
2.5 TABLET ORAL DAILY
Qty: 15 TABLET | Refills: 0 | Status: SHIPPED
Start: 2021-09-16 | End: 2021-10-06

## 2021-09-16 ASSESSMENT — ENCOUNTER SYMPTOMS
BLOOD IN STOOL: 0
VOMITING: 0
WHEEZING: 0
EYE PAIN: 0
COUGH: 0
DIARRHEA: 0
SHORTNESS OF BREATH: 0
ABDOMINAL PAIN: 0
COLOR CHANGE: 0
NAUSEA: 0
CONSTIPATION: 0

## 2021-09-16 NOTE — PROGRESS NOTES
9/16/2021    Eros Yee is a 78 y.o. female here for:    Chief Complaint   Patient presents with    Sweats     night sweats    Hypertension        HPI:  Night sweats   - Started about 10 years ago  - Had a CHEVY with bilateral salpingoopherectomy about 15 years ago for AUB and ovarian cysts. States that after having this surgery, her night sweats were more frequent. - Occurs a few times per week. Worse in the summer months.  - Denies red flag symptoms of weight loss, appetite change, bruising, and adenopathy.   - On CPAP for JESSICA for last 12 years. Compliant with CPAP. Last sleep study in 2019 showed that CPAP pressure was appropriate.   - Has never been on estrogen for night sweats.   - No PMHx of VTE, cancer, or smoking. HTN   - On metoprolol succinate 25 mg BID due to comorbid condition of atrial fibrillation.   - Last eye exam: 1 year ago, due  - Denies chest pain, double vision, blurry vision, lightheadedness, dizziness, and syncope. Current Outpatient Medications   Medication Sig Dispense Refill    amLODIPine (NORVASC) 5 MG tablet Take 0.5 tablets by mouth daily 15 tablet 0    levothyroxine (SYNTHROID) 112 MCG tablet Take 1 tablet by mouth daily 90 tablet 1    metoprolol succinate (TOPROL XL) 25 MG extended release tablet Take 1 tablet by mouth 2 times daily 180 tablet 1    atorvastatin (LIPITOR) 40 MG tablet Take 1 tablet by mouth nightly 90 tablet 1    ELIQUIS 5 MG TABS tablet TAKE 1 TABLET TWICE A  tablet 1    Multiple Vitamins-Minerals (CENTRUM SILVER ULTRA WOMENS PO) Take by mouth      Calcium-Magnesium-Vitamin D (CALCIUM 1200+D3 PO) Take by mouth      Cholecalciferol (VITAMIN D3) 5000 units TABS Take by mouth daily       polyethyl glycol-propyl glycol 0.4-0.3 % (SYSTANE) 0.4-0.3 % ophthalmic solution 2 drops as needed for Dry Eyes (Patient not taking: Reported on 9/16/2021)       No current facility-administered medications for this visit.       No Known Allergies    Past Medical & Surgical History:      Diagnosis Date    A-fib [facility])     Diverticulosis 2021    on colonoscopy    Hyperlipidemia     Hypothyroidism     JESSICA (obstructive sleep apnea)     On CPAP, wears nightly     Tubular adenoma of colon 2021    on colonoscopy     Past Surgical History:   Procedure Laterality Date     SECTION N/A     x 2    COLONOSCOPY N/A 2021    COLONOSCOPY WITH BIOPSY performed by Deepak Reddy DO at [address], TOTAL ABDOMINAL      WISDOM TOOTH EXTRACTION         Family History:      Problem Relation Age of Onset    Stroke Mother 35   Bertell Halim Rheum Arthritis Mother     COPD Father     Colon Polyps Father     Mult Sclerosis Father 48    Hypothyroidism Brother     Rheum Arthritis Brother     Hemochromatosis Brother     Stroke Brother     Psoriasis Brother     Arthritis Brother     COPD Brother     Other Brother         Venous stasis    High Blood Pressure Brother     Arthritis Brother     Glaucoma Brother     Hypothyroidism Maternal Grandmother     Deep Vein Thrombosis Maternal Grandmother     Heart Disease Maternal Grandfather     Atrial Fibrillation Paternal Grandmother     Hypothyroidism Paternal Grandmother     Colon Cancer Paternal Grandfather     Ovarian Cancer Maternal Aunt [de-identified]       Social History:  Social History     Tobacco Use    Smoking status: Never Smoker    Smokeless tobacco: Never Used   Substance Use Topics    Alcohol use: No       Review of Systems   Constitutional: Negative for appetite change, chills, fever and unexpected weight change. Eyes: Negative for pain and visual disturbance. Respiratory: Negative for cough, shortness of breath and wheezing. Cardiovascular: Negative for chest pain, palpitations and leg swelling. Gastrointestinal: Negative for abdominal pain, blood in stool, constipation, diarrhea, nausea and vomiting.    Genitourinary: Negative for dysuria, hematuria and vaginal bleeding. Skin: Negative for color change and rash. Neurological: Negative for dizziness, syncope and light-headedness. Hematological: Negative for adenopathy. Does not bruise/bleed easily. BP Readings from Last 3 Encounters:   09/16/21 (!) 140/88   08/30/21 138/66   08/16/21 (!) 150/86       VS:  BP (!) 140/88 (Site: Right Upper Arm, Position: Sitting, Cuff Size: Large Adult)   Pulse 54   Temp 97.3 °F (36.3 °C)   Resp 16   Ht 5' 4\" (1.626 m)   Wt 262 lb 12.8 oz (119.2 kg)   LMP  (LMP Unknown)   SpO2 95%   BMI 45.11 kg/m²     Physical Exam  Vitals reviewed. Constitutional:       General: She is awake. She is not in acute distress. Appearance: She is well-developed and well-groomed. She is obese. She is not ill-appearing, toxic-appearing or diaphoretic. Neck:      Vascular: No carotid bruit. Cardiovascular:      Rate and Rhythm: Normal rate and regular rhythm. Heart sounds: S1 normal and S2 normal. No murmur heard. Pulmonary:      Breath sounds: No decreased breath sounds, wheezing, rhonchi or rales. Abdominal:      General: Bowel sounds are normal. There is no distension. Palpations: Abdomen is soft. Tenderness: There is no abdominal tenderness. There is no guarding or rebound. Musculoskeletal:      Cervical back: Neck supple. Right lower leg: No edema. Left lower leg: No edema. Lymphadenopathy:      Cervical: No cervical adenopathy. Skin:     General: Skin is warm and dry. Neurological:      General: No focal deficit present. Mental Status: She is alert. Psychiatric:         Attention and Perception: Attention normal.         Mood and Affect: Mood normal.         Speech: Speech normal.         Behavior: Behavior normal. Behavior is cooperative. Thought Content: Thought content normal.         Cognition and Memory: Cognition normal.         Judgment: Judgment normal.         Assessment/Plan:  1.  Night sweats  Seems to be hormone-related. Patient does not want to do trial of estrogen therapy due to risks of VTE and breast cancer. Patient educated on red flag symptoms and is to let me know if any of these symptoms arise or if night sweats worsen. 2. Essential hypertension  Uncontrolled. Will add amlodipine 2.5 mg QD. Continue metoprolol 25 mg BID. Follow-up in about 3 weeks for BP recheck. - amLODIPine (NORVASC) 5 MG tablet; Take 0.5 tablets by mouth daily  Dispense: 15 tablet; Refill: 0    3. Flu vaccine need  - INFLUENZA, QUADV, ADJUVANTED, 65 YRS =, IM, PF, PREFILL SYR, 0.5ML (FLUAD)        On this date 9/16/2021 I have spent 28 minutes reviewing previous notes, test results and face to face with the patient discussing the diagnosis and importance of compliance with the treatment plan as well as documenting on the day of the visit. Return in about 20 days (around 10/6/2021) for HTN follow-up.       Sherman Zuniga, DO  Family Medicine

## 2021-10-06 ENCOUNTER — OFFICE VISIT (OUTPATIENT)
Dept: FAMILY MEDICINE CLINIC | Age: 71
End: 2021-10-06
Payer: MEDICARE

## 2021-10-06 VITALS
HEART RATE: 89 BPM | OXYGEN SATURATION: 98 % | RESPIRATION RATE: 18 BRPM | HEIGHT: 64 IN | TEMPERATURE: 97.2 F | WEIGHT: 262 LBS | BODY MASS INDEX: 44.73 KG/M2 | DIASTOLIC BLOOD PRESSURE: 60 MMHG | SYSTOLIC BLOOD PRESSURE: 98 MMHG

## 2021-10-06 DIAGNOSIS — R09.89 RIGHT CAROTID BRUIT: ICD-10-CM

## 2021-10-06 DIAGNOSIS — I10 ESSENTIAL HYPERTENSION: Primary | ICD-10-CM

## 2021-10-06 PROCEDURE — 99213 OFFICE O/P EST LOW 20 MIN: CPT | Performed by: FAMILY MEDICINE

## 2021-10-06 RX ORDER — TERBINAFINE HYDROCHLORIDE 250 MG/1
TABLET ORAL
COMMUNITY
Start: 2021-10-04 | End: 2022-04-06 | Stop reason: ALTCHOICE

## 2021-10-06 RX ORDER — AMMONIUM LACTATE 12 G/100G
LOTION TOPICAL
COMMUNITY
Start: 2021-10-04

## 2021-10-06 ASSESSMENT — ENCOUNTER SYMPTOMS
WHEEZING: 0
EYE PAIN: 0
ABDOMINAL PAIN: 0
SHORTNESS OF BREATH: 0
VOMITING: 0
CONSTIPATION: 0
DIARRHEA: 0
NAUSEA: 0
COLOR CHANGE: 0
BLOOD IN STOOL: 0
COUGH: 0

## 2021-10-06 NOTE — PROGRESS NOTES
10/6/2021    Kristi Bowers is a 78 y.o. female here for:    Chief Complaint   Patient presents with    Hypertension        HPI:  HTN   - On metoprolol 25 mg BID and amlodipine 2.5 mg QD. Denies side effects on medications. - BP at home yesterday was 120/72.  - Denies chest pain, double vision, blurry vision, dizziness, lightheadedness, and syncope. - Admits to palpitations. Hx of atrial fibrillation. On Eliquis. Current Outpatient Medications   Medication Sig Dispense Refill    levothyroxine (SYNTHROID) 112 MCG tablet Take 1 tablet by mouth daily 90 tablet 1    metoprolol succinate (TOPROL XL) 25 MG extended release tablet Take 1 tablet by mouth 2 times daily 180 tablet 1    atorvastatin (LIPITOR) 40 MG tablet Take 1 tablet by mouth nightly 90 tablet 1    ELIQUIS 5 MG TABS tablet TAKE 1 TABLET TWICE A  tablet 1    Multiple Vitamins-Minerals (CENTRUM SILVER ULTRA WOMENS PO) Take by mouth      Calcium-Magnesium-Vitamin D (CALCIUM 1200+D3 PO) Take by mouth      Cholecalciferol (VITAMIN D3) 5000 units TABS Take by mouth daily       ammonium lactate (LAC-HYDRIN) 12 % lotion       terbinafine (LAMISIL) 250 MG tablet       polyethyl glycol-propyl glycol 0.4-0.3 % (SYSTANE) 0.4-0.3 % ophthalmic solution 2 drops as needed for Dry Eyes (Patient not taking: Reported on 2021)       No current facility-administered medications for this visit.       No Known Allergies    Past Medical & Surgical History:      Diagnosis Date    A-fib [facility])     Diverticulosis 2021    on colonoscopy    Hyperlipidemia     Hypothyroidism     JESSICA (obstructive sleep apnea)     On CPAP, wears nightly     Tubular adenoma of colon 2021    on colonoscopy     Past Surgical History:   Procedure Laterality Date     SECTION N/A     x 2    COLONOSCOPY N/A 2021    COLONOSCOPY WITH BIOPSY performed by Isa Macdonald [facility] at [facility] EXTRACTION         Family History:      Problem Relation Age of Onset    Stroke Mother 35   Perea Rheum Arthritis Mother     COPD Father     Colon Polyps Father     Mult Sclerosis Father 48    Hypothyroidism Brother     Rheum Arthritis Brother     Hemochromatosis Brother     Stroke Brother     Psoriasis Brother     Arthritis Brother     COPD Brother     Other Brother         Venous stasis    High Blood Pressure Brother     Arthritis Brother     Glaucoma Brother     Hypothyroidism Maternal Grandmother     Deep Vein Thrombosis Maternal Grandmother     Heart Disease Maternal Grandfather     Atrial Fibrillation Paternal Grandmother     Hypothyroidism Paternal Grandmother     Colon Cancer Paternal Grandfather     Ovarian Cancer Maternal Aunt [de-identified]       Social History:  Social History     Tobacco Use    Smoking status: Never Smoker    Smokeless tobacco: Never Used   Substance Use Topics    Alcohol use: No       Review of Systems   Constitutional: Negative for chills and fever. Eyes: Negative for pain and visual disturbance. Respiratory: Negative for cough, shortness of breath and wheezing. Cardiovascular: Positive for palpitations (hx of atrial fibrillation). Negative for chest pain and leg swelling. Gastrointestinal: Negative for abdominal pain, blood in stool, constipation, diarrhea, nausea and vomiting. Skin: Negative for color change and rash. Neurological: Negative for dizziness, syncope and light-headedness. BP Readings from Last 3 Encounters:   10/06/21 98/60   09/16/21 (!) 140/88   08/30/21 138/66       VS:  BP 98/60 (Site: Left Upper Arm, Position: Sitting, Cuff Size: Large Adult)   Pulse 89   Temp 97.2 °F (36.2 °C) (Temporal)   Resp 18   Ht 5' 4\" (1.626 m)   Wt 262 lb (118.8 kg)   LMP  (LMP Unknown)   SpO2 98%   BMI 44.97 kg/m²     Physical Exam  Vitals reviewed. Constitutional:       General: She is awake. She is not in acute distress.      Appearance: She is well-developed and well-groomed. She is obese. She is not ill-appearing, toxic-appearing or diaphoretic. Eyes:      General: No scleral icterus. Conjunctiva/sclera:      Right eye: Right conjunctiva is not injected. Left eye: Left conjunctiva is not injected. Neck:      Vascular: Carotid bruit (right) present. Cardiovascular:      Rate and Rhythm: Normal rate and regular rhythm. Heart sounds: S1 normal and S2 normal. No murmur heard. Pulmonary:      Breath sounds: No decreased breath sounds, wheezing, rhonchi or rales. Abdominal:      General: Bowel sounds are normal. There is no distension. Palpations: Abdomen is soft. Tenderness: There is no abdominal tenderness. There is no guarding or rebound. Musculoskeletal:      Cervical back: Neck supple. Right lower leg: No edema. Left lower leg: No edema. Skin:     General: Skin is warm and dry. Neurological:      General: No focal deficit present. Mental Status: She is alert. Psychiatric:         Attention and Perception: Attention normal.         Mood and Affect: Mood normal.         Speech: Speech normal.         Behavior: Behavior normal. Behavior is cooperative. Thought Content: Thought content normal.         Cognition and Memory: Cognition normal.         Judgment: Judgment normal.         Assessment/Plan:  1. Essential hypertension  BP is low today. Will stop amlodipine 2.5 mg QD. Continue metoprolol 25 mg BID. Check labs prior to next appointment with me in 6 months.   - CBC WITH AUTO DIFFERENTIAL; Future  - COMPREHENSIVE METABOLIC PANEL; Future    2. Right carotid bruit  Check US  - US CAROTID ARTERY BILATERAL; Future        Return in about 6 months (around 4/6/2022) for follow-up for HTN .       Gayle Rich, DO  Family Medicine

## 2021-10-19 ENCOUNTER — HOSPITAL ENCOUNTER (OUTPATIENT)
Dept: ULTRASOUND IMAGING | Age: 71
Discharge: HOME | End: 2021-10-21
Payer: MEDICARE

## 2021-10-19 DIAGNOSIS — R09.89 RIGHT CAROTID BRUIT: ICD-10-CM

## 2021-10-19 PROCEDURE — 93880 EXTRACRANIAL BILAT STUDY: CPT

## 2021-11-02 ENCOUNTER — HOSPITAL ENCOUNTER (OUTPATIENT)
Dept: MAMMOGRAPHY | Age: 71
Discharge: HOME | End: 2021-11-04
Payer: MEDICARE

## 2021-11-02 DIAGNOSIS — Z12.31 ENCOUNTER FOR SCREENING MAMMOGRAM FOR MALIGNANT NEOPLASM OF BREAST: ICD-10-CM

## 2021-11-02 PROCEDURE — 77063 BREAST TOMOSYNTHESIS BI: CPT

## 2021-11-02 RX ORDER — APIXABAN 5 MG/1
TABLET, FILM COATED ORAL
Qty: 180 TABLET | Refills: 3 | Status: SHIPPED
Start: 2021-11-02 | End: 2022-04-06 | Stop reason: SDUPTHER

## 2022-04-01 DIAGNOSIS — I10 ESSENTIAL HYPERTENSION: ICD-10-CM

## 2022-04-01 LAB
ALBUMIN SERPL-MCNC: 4.3 G/DL (ref 3.5–5.2)
ALP BLD-CCNC: 92 U/L (ref 35–104)
ALT SERPL-CCNC: 21 U/L (ref 0–32)
ANION GAP SERPL CALCULATED.3IONS-SCNC: 12 MMOL/L (ref 7–16)
AST SERPL-CCNC: 22 U/L (ref 0–31)
BASOPHILS ABSOLUTE: 0.03 E9/L (ref 0–0.2)
BASOPHILS RELATIVE PERCENT: 0.4 % (ref 0–2)
BILIRUB SERPL-MCNC: 0.4 MG/DL (ref 0–1.2)
BUN BLDV-MCNC: 18 MG/DL (ref 6–23)
CALCIUM SERPL-MCNC: 9.2 MG/DL (ref 8.6–10.2)
CHLORIDE BLD-SCNC: 105 MMOL/L (ref 98–107)
CO2: 24 MMOL/L (ref 22–29)
CREAT SERPL-MCNC: 0.8 MG/DL (ref 0.5–1)
EOSINOPHILS ABSOLUTE: 0.25 E9/L (ref 0.05–0.5)
EOSINOPHILS RELATIVE PERCENT: 3.3 % (ref 0–6)
GFR AFRICAN AMERICAN: >60
GFR NON-AFRICAN AMERICAN: >60 ML/MIN/1.73
GLUCOSE BLD-MCNC: 103 MG/DL (ref 74–99)
HCT VFR BLD CALC: 44.6 % (ref 34–48)
HEMOGLOBIN: 13.8 G/DL (ref 11.5–15.5)
IMMATURE GRANULOCYTES #: 0.02 E9/L
IMMATURE GRANULOCYTES %: 0.3 % (ref 0–5)
LYMPHOCYTES ABSOLUTE: 1.56 E9/L (ref 1.5–4)
LYMPHOCYTES RELATIVE PERCENT: 20.6 % (ref 20–42)
MCH RBC QN AUTO: 28.2 PG (ref 26–35)
MCHC RBC AUTO-ENTMCNC: 30.9 % (ref 32–34.5)
MCV RBC AUTO: 91 FL (ref 80–99.9)
MONOCYTES ABSOLUTE: 0.7 E9/L (ref 0.1–0.95)
MONOCYTES RELATIVE PERCENT: 9.3 % (ref 2–12)
NEUTROPHILS ABSOLUTE: 5 E9/L (ref 1.8–7.3)
NEUTROPHILS RELATIVE PERCENT: 66.1 % (ref 43–80)
PDW BLD-RTO: 13.9 FL (ref 11.5–15)
PLATELET # BLD: 225 E9/L (ref 130–450)
PMV BLD AUTO: 11.9 FL (ref 7–12)
POTASSIUM SERPL-SCNC: 4.7 MMOL/L (ref 3.5–5)
RBC # BLD: 4.9 E12/L (ref 3.5–5.5)
SODIUM BLD-SCNC: 141 MMOL/L (ref 132–146)
TOTAL PROTEIN: 7.7 G/DL (ref 6.4–8.3)
WBC # BLD: 7.6 E9/L (ref 4.5–11.5)

## 2022-04-06 ENCOUNTER — OFFICE VISIT (OUTPATIENT)
Dept: FAMILY MEDICINE CLINIC | Age: 72
End: 2022-04-06
Payer: MEDICARE

## 2022-04-06 VITALS
TEMPERATURE: 96.7 F | BODY MASS INDEX: 48.55 KG/M2 | SYSTOLIC BLOOD PRESSURE: 112 MMHG | WEIGHT: 274 LBS | OXYGEN SATURATION: 98 % | HEIGHT: 63 IN | DIASTOLIC BLOOD PRESSURE: 80 MMHG | HEART RATE: 64 BPM

## 2022-04-06 DIAGNOSIS — I10 ESSENTIAL HYPERTENSION: ICD-10-CM

## 2022-04-06 DIAGNOSIS — Z78.0 POSTMENOPAUSE: ICD-10-CM

## 2022-04-06 DIAGNOSIS — E55.9 VITAMIN D DEFICIENCY: ICD-10-CM

## 2022-04-06 DIAGNOSIS — E03.9 ACQUIRED HYPOTHYROIDISM: ICD-10-CM

## 2022-04-06 DIAGNOSIS — Z00.00 MEDICARE ANNUAL WELLNESS VISIT, SUBSEQUENT: Primary | ICD-10-CM

## 2022-04-06 DIAGNOSIS — E78.2 MIXED HYPERLIPIDEMIA: ICD-10-CM

## 2022-04-06 DIAGNOSIS — I48.0 PAROXYSMAL ATRIAL FIBRILLATION (HCC): ICD-10-CM

## 2022-04-06 DIAGNOSIS — R73.9 ELEVATED SERUM GLUCOSE: ICD-10-CM

## 2022-04-06 PROCEDURE — G0439 PPPS, SUBSEQ VISIT: HCPCS | Performed by: FAMILY MEDICINE

## 2022-04-06 RX ORDER — METOPROLOL SUCCINATE 25 MG/1
25 TABLET, EXTENDED RELEASE ORAL NIGHTLY
Qty: 90 TABLET | Refills: 1 | Status: SHIPPED
Start: 2022-04-06 | End: 2022-10-03

## 2022-04-06 RX ORDER — LEVOTHYROXINE SODIUM 112 UG/1
112 TABLET ORAL DAILY
Qty: 90 TABLET | Refills: 1 | Status: SHIPPED
Start: 2022-04-06 | End: 2022-09-15

## 2022-04-06 RX ORDER — ATORVASTATIN CALCIUM 40 MG/1
40 TABLET, FILM COATED ORAL NIGHTLY
Qty: 90 TABLET | Refills: 1 | Status: SHIPPED
Start: 2022-04-06 | End: 2022-09-15

## 2022-04-06 ASSESSMENT — ENCOUNTER SYMPTOMS
WHEEZING: 0
COUGH: 0
ABDOMINAL PAIN: 0
BLOOD IN STOOL: 0
COLOR CHANGE: 0
CONSTIPATION: 0
SHORTNESS OF BREATH: 0
VOMITING: 0
DIARRHEA: 0
NAUSEA: 0
EYE PAIN: 0

## 2022-04-06 ASSESSMENT — PATIENT HEALTH QUESTIONNAIRE - PHQ9
2. FEELING DOWN, DEPRESSED OR HOPELESS: 0
1. LITTLE INTEREST OR PLEASURE IN DOING THINGS: 0
SUM OF ALL RESPONSES TO PHQ QUESTIONS 1-9: 0
SUM OF ALL RESPONSES TO PHQ QUESTIONS 1-9: 0
SUM OF ALL RESPONSES TO PHQ9 QUESTIONS 1 & 2: 0
SUM OF ALL RESPONSES TO PHQ QUESTIONS 1-9: 0
SUM OF ALL RESPONSES TO PHQ QUESTIONS 1-9: 0

## 2022-04-06 NOTE — PROGRESS NOTES
4/6/2022    Kellie Tabares is a 73 y.o. female here for:    Chief Complaint   Patient presents with    Medicare AW    Hypertension    Hyperlipidemia    Hypothyroidism    Atrial Fibrillation        HPI:  HTN   - On metoprolol succinate 25 mg HS. Patient states that she decreased the dose on her own due to feeling fatigued. She was previously on metoprolol succinate 25 mg BID. She reports an improvement in her fatigue.  - Denies double vision, blurry vision, chest pain, palpitations, lightheadedness, dizziness, and syncope. HLD   - On atorvastatin 40 mg HS. Reports compliance with medication. Denies side effects on medication.  - Diet: eats everything    - Exercise: swimming at the [facility] twice weekly   The 10-year ASCVD risk score (Arun Fuentes, et al., 2013) is: 6.5%    Values used to calculate the score:      Age: 73 years      Sex: Female      Is Non- : No      Diabetic: No      Tobacco smoker: No      Systolic Blood Pressure: 861 mmHg      Is BP treated: No      HDL Cholesterol: 45 mg/dL      Total Cholesterol: 164 mg/dL    Hypothyroidism   - On Synthroid 112 mcg QD. Reports compliance with medication. Denies side effects of medication. Atrial fibrillation   - On Eliquis 5 mg BID. Reports compliance with medication. Denies side effects of medication. Current Outpatient Medications   Medication Sig Dispense Refill    atorvastatin (LIPITOR) 40 MG tablet Take 1 tablet by mouth nightly 90 tablet 1    levothyroxine (SYNTHROID) 112 MCG tablet Take 1 tablet by mouth daily 90 tablet 1    metoprolol succinate (TOPROL XL) 25 MG extended release tablet Take 1 tablet by mouth at bedtime 90 tablet 1    apixaban (ELIQUIS) 5 MG TABS tablet Take 1 tablet by mouth 2 times daily 180 tablet 1    mupirocin (BACTROBAN) 2 % ointment Apply topically 3 times daily.  22 g 0    ammonium lactate (LAC-HYDRIN) 12 % lotion       Multiple Vitamins-Minerals (CENTRUM SILVER ULTRA WOMENS PO) Take by mouth      Calcium-Magnesium-Vitamin D (CALCIUM 1200+D3 PO) Take by mouth      Cholecalciferol (VITAMIN D3) 5000 units TABS Take by mouth daily       polyethyl glycol-propyl glycol 0.4-0.3 % (SYSTANE) 0.4-0.3 % ophthalmic solution 2 drops as needed for Dry Eyes (Patient not taking: Reported on 2021)       No current facility-administered medications for this visit. No Known Allergies    Past Medical & Surgical History:      Diagnosis Date    A-fib [facility])     Diverticulosis 2021    on colonoscopy    Hyperlipidemia     Hypothyroidism     JESSICA (obstructive sleep apnea)     On CPAP, wears nightly     Tubular adenoma of colon 2021    on colonoscopy     Past Surgical History:   Procedure Laterality Date     SECTION N/A     x 2    COLONOSCOPY N/A 2021    COLONOSCOPY WITH BIOPSY performed by Qiana Grijalva DO at [address], TOTAL ABDOMINAL      WISDOM TOOTH EXTRACTION         Family History:      Problem Relation Age of Onset    Stroke Mother 35   [place] Favorite Rheum Arthritis Mother     COPD Father     Colon Polyps Father     Mult Sclerosis Father 48    Hypothyroidism Brother     Rheum Arthritis Brother     Hemochromatosis Brother     Stroke Brother     Psoriasis Brother     Arthritis Brother     COPD Brother     Other Brother         Venous stasis    High Blood Pressure Brother     Arthritis Brother     Glaucoma Brother     Hypothyroidism Maternal Grandmother     Deep Vein Thrombosis Maternal Grandmother     Heart Disease Maternal Grandfather     Atrial Fibrillation Paternal Grandmother     Hypothyroidism Paternal Grandmother     Colon Cancer Paternal Grandfather     Ovarian Cancer Maternal Aunt [de-identified]       Social History:  Social History     Tobacco Use    Smoking status: Never Smoker    Smokeless tobacco: Never Used   Substance Use Topics    Alcohol use: No       Review of Systems   Constitutional: Negative for chills and fever.    Eyes: Negative for pain and visual disturbance. Respiratory: Negative for cough, shortness of breath and wheezing. Cardiovascular: Negative for chest pain, palpitations and leg swelling. Gastrointestinal: Negative for abdominal pain, blood in stool, constipation, diarrhea, nausea and vomiting. Skin: Negative for color change and rash. Neurological: Negative for dizziness, syncope and light-headedness. Psychiatric/Behavioral: Negative for dysphoric mood. The patient is not nervous/anxious. BP Readings from Last 3 Encounters:   04/06/22 112/80   10/06/21 98/60   09/16/21 (!) 140/88       VS:  /80 (Site: Left Upper Arm, Position: Sitting, Cuff Size: Large Adult)   Pulse 64   Temp 96.7 °F (35.9 °C)   Ht 5' 3\" (1.6 m)   Wt 274 lb (124.3 kg)   LMP  (LMP Unknown)   SpO2 98%   BMI 48.54 kg/m²     Physical Exam  Vitals reviewed. Constitutional:       General: She is awake. She is not in acute distress. Appearance: She is well-developed and well-groomed. She is obese. She is not ill-appearing, toxic-appearing or diaphoretic. Neck:      Vascular: No carotid bruit. Cardiovascular:      Rate and Rhythm: Normal rate and regular rhythm. Heart sounds: S1 normal and S2 normal. No murmur heard. Pulmonary:      Breath sounds: No decreased breath sounds, wheezing, rhonchi or rales. Abdominal:      General: Bowel sounds are normal. There is no distension. Palpations: Abdomen is soft. Tenderness: There is no abdominal tenderness. There is no guarding or rebound. Musculoskeletal:      Cervical back: Neck supple. Right lower leg: No tenderness. No edema. Left lower leg: No tenderness. No edema. Skin:     General: Skin is warm and dry. Neurological:      General: No focal deficit present. Mental Status: She is alert.    Psychiatric:         Attention and Perception: Attention normal.         Mood and Affect: Mood normal.         Speech: Speech normal. Behavior: Behavior normal. Behavior is cooperative. Thought Content: Thought content normal.         Cognition and Memory: Cognition normal.         Judgment: Judgment normal.         Assessment/Plan:  1. Medicare annual wellness visit, subsequent    2. Essential hypertension  Well-controlled. Continue Toprol XL 25 mg HS. Check labs. - metoprolol succinate (TOPROL XL) 25 MG extended release tablet; Take 1 tablet by mouth at bedtime  Dispense: 90 tablet; Refill: 1  - MICROALBUMIN / CREATININE URINE RATIO; Future  - CBC with Auto Differential; Future  - Comprehensive Metabolic Panel; Future    3. Mixed hyperlipidemia  Stable. Continue atorvastatin 40 mg HS. Check lipid panel.   - atorvastatin (LIPITOR) 40 MG tablet; Take 1 tablet by mouth nightly  Dispense: 90 tablet; Refill: 1  - LIPID PANEL; Future  - LIPID PANEL; Future    4. Acquired hypothyroidism  Stable. Continue Synthroid 112 mcg QD. Check TSH. - levothyroxine (SYNTHROID) 112 MCG tablet; Take 1 tablet by mouth daily  Dispense: 90 tablet; Refill: 1  - TSH; Future  - TSH; Future    5. Paroxysmal atrial fibrillation (HCC)  Well-controlled. Continue Toprol XL 25 mg HS and Eliquis 5 mg BID. - metoprolol succinate (TOPROL XL) 25 MG extended release tablet; Take 1 tablet by mouth at bedtime  Dispense: 90 tablet; Refill: 1  - apixaban (ELIQUIS) 5 MG TABS tablet; Take 1 tablet by mouth 2 times daily  Dispense: 180 tablet; Refill: 1    6. Elevated serum glucose  - Hemoglobin A1C; Future  - Hemoglobin A1C; Future    7. Vitamin D deficiency  - Vitamin D 25 Hydroxy; Future    8. Postmenopause  - DEXA BONE DENSITY AXIAL SKELETON; Future        Return in about 6 months (around 10/6/2022) for 6 month med check.       Brenda Santiago DO  Family Medicine

## 2022-04-06 NOTE — PROGRESS NOTES
.  Medicare Annual Wellness Visit    Amador Booth is here for Medicare AWV, Hypertension, Hyperlipidemia, Hypothyroidism, and Atrial Fibrillation    Assessment & Plan   Medicare annual wellness visit, subsequent  Mixed hyperlipidemia  -     atorvastatin (LIPITOR) 40 MG tablet; Take 1 tablet by mouth nightly, Disp-90 tablet, R-1Normal  -     LIPID PANEL; Future  -     LIPID PANEL; Future  Acquired hypothyroidism  -     levothyroxine (SYNTHROID) 112 MCG tablet; Take 1 tablet by mouth daily, Disp-90 tablet, R-1Normal  -     TSH; Future  -     TSH; Future  Paroxysmal atrial fibrillation (HCC)  -     metoprolol succinate (TOPROL XL) 25 MG extended release tablet; Take 1 tablet by mouth at bedtime, Disp-90 tablet, R-1Normal  -     apixaban (ELIQUIS) 5 MG TABS tablet; Take 1 tablet by mouth 2 times daily, Disp-180 tablet, R-1Normal  Postmenopause  -     DEXA BONE DENSITY AXIAL SKELETON; Future  Vitamin D deficiency  -     Vitamin D 25 Hydroxy; Future  Elevated serum glucose  -     Hemoglobin A1C; Future  -     Hemoglobin A1C; Future  Essential hypertension  -     MICROALBUMIN / CREATININE URINE RATIO; Future  -     CBC with Auto Differential; Future  -     Comprehensive Metabolic Panel; Future     Recommendations for Preventive Services Due: see orders and patient instructions/AVS.  Recommended screening schedule for the next 5-10 years is provided to the patient in written form: see Patient Instructions/AVS.     Return in about 6 months (around 10/6/2022) for 6 month med check. Subjective   Patient's complete Health Risk Assessment and screening values have been reviewed and are found in Flowsheets. The following problems were reviewed today and where indicated follow up appointments were made and/or referrals ordered.     Positive Risk Factor Screenings with Interventions:               General Health and ACP:  General  In general, how would you say your health is?: Good  In the past 7 days, have you experienced any of the following: New or Increased Pain, New or Increased Fatigue, Loneliness, Social Isolation, Stress or Anger?: No  Do you get the social and emotional support that you need?: Yes  Do you have a Living Will?: Yes    Advance Directives     Power of Miriam Suh Will ACP-Advance Directive ACP-Power of     Not on File Not on File Not on File Not on File      General Health Risk Interventions:  · Up-to-date on ACP    Health Habits/Nutrition:     Physical Activity: Insufficiently Active    Days of Exercise per Week: 2 days    Minutes of Exercise per Session: 50 min     Have you lost any weight without trying in the past 3 months?: No  Body mass index: (!) 48.53  Have you seen the dentist within the past year?: Yes    Health Habits/Nutrition Interventions:  · Nutritional issues:  educational materials for healthy, well-balanced diet provided    Hearing/Vision:  Do you or your family notice any trouble with your hearing that hasn't been managed with hearing aids?: (!) Yes (right ear)  Do you have difficulty driving, watching TV, or doing any of your daily activities because of your eyesight?: No  Have you had an eye exam within the past year?: (!) No  No exam data present    Hearing/Vision Interventions:  · Hearing concerns:  patient declines any further evaluation/treatment for hearing issues  · Vision concerns:  patient encouraged to make appointment with his/her eye specialist    Safety:  Do you have working smoke detectors?: Yes  Do you have any tripping hazards - loose or unsecured carpets or rugs?: No  Do you have any tripping hazards - clutter in doorways, halls, or stairs?: No  Do you have either shower bars, grab bars, non-slip mats or non-slip surfaces in your shower or bathtub?: Yes  Do all of your stairways have a railing or banister?: (!) No  Do you always fasten your seatbelt when you are in a car?: Yes    Safety Interventions:  · Home safety tips provided           Objective   Vitals: 04/06/22 0829 04/06/22 0929   BP: 110/78 112/80   Site: Left Upper Arm Left Upper Arm   Position: Sitting Sitting   Cuff Size: Large Adult Large Adult   Pulse: 64    Temp: 96.7 °F (35.9 °C)    SpO2: 98%    Weight: 274 lb (124.3 kg)    Height: 5' 3\" (1.6 m)       Body mass index is 48.54 kg/m². No Known Allergies  Prior to Visit Medications    Medication Sig Taking? Authorizing Provider   atorvastatin (LIPITOR) 40 MG tablet Take 1 tablet by mouth nightly Yes Min Melendez DO   levothyroxine (SYNTHROID) 112 MCG tablet Take 1 tablet by mouth daily Yes Nya Tomlin DO   metoprolol succinate (TOPROL XL) 25 MG extended release tablet Take 1 tablet by mouth at bedtime Yes Nya Tomlin DO   apixaban (ELIQUIS) 5 MG TABS tablet Take 1 tablet by mouth 2 times daily Yes Nya Tomlin DO   mupirocin (BACTROBAN) 2 % ointment Apply topically 3 times daily.  Yes Min Melendez DO   ammonium lactate (LAC-HYDRIN) 12 % lotion  Yes Historical Provider, MD   Multiple Vitamins-Minerals (CENTRUM SILVER ULTRA WOMENS PO) Take by mouth Yes Historical Provider, MD   Calcium-Magnesium-Vitamin D (CALCIUM 1200+D3 PO) Take by mouth Yes Historical Provider, MD   Cholecalciferol (VITAMIN D3) 5000 units TABS Take by mouth daily  Yes Historical Provider, MD   polyethyl glycol-propyl glycol 0.4-0.3 % (SYSTANE) 0.4-0.3 % ophthalmic solution 2 drops as needed for Dry Eyes  Patient not taking: Reported on 9/16/2021  Historical Provider, MD Lozada (Including outside providers/suppliers regularly involved in providing care):   Patient Care Team:  Min Melendez DO as PCP - General (Family Medicine)  Min Melendez DO as PCP - [facility] Empaneled Provider    Reviewed and updated this visit:  Tobacco  Allergies  Meds  Problems  Med Hx  Surg Hx  Soc Hx  Fam Hx             Electronically signed by Min Melendez DO on 4/6/2022 at 8:29 PM

## 2022-04-06 NOTE — PATIENT INSTRUCTIONS
Patient Education     DASH Diet: Care Instructions  Your Care Instructions     The DASH diet is an eating plan that can help lower your blood pressure. DASH stands for Dietary Approaches to Stop Hypertension. Hypertension is high bloodpressure. The DASH diet focuses on eating foods that are high in calcium, potassium, and magnesium. These nutrients can lower blood pressure. The foods that are highest in these nutrients are fruits, vegetables, low-fat dairy products, nuts, seeds, and legumes. But taking calcium, potassium, and magnesium supplements instead of eating foods that are high in those nutrients does not have the same effect. The DASH diet also includes whole grains, fish, and poultry. The DASH diet is one of several lifestyle changes your doctor may recommend to lower your high blood pressure. Your doctor may also want you to decrease the amount of sodium in your diet. Lowering sodium while following the DASH dietcan lower blood pressure even further than just the DASH diet alone. Follow-up care is a key part of your treatment and safety. Be sure to make and go to all appointments, and call your doctor if you are having problems. It's also a good idea to know your test results and keep alist of the medicines you take. How can you care for yourself at home? Following the DASH diet   Eat 4 to 5 servings of fruit each day. A serving is 1 medium-sized piece of fruit, ½ cup chopped or canned fruit, 1/4 cup dried fruit, or 4 ounces (½ cup) of fruit juice. Choose fruit more often than fruit juice.  Eat 4 to 5 servings of vegetables each day. A serving is 1 cup of lettuce or raw leafy vegetables, ½ cup of chopped or cooked vegetables, or 4 ounces (½ cup) of vegetable juice. Choose vegetables more often than vegetable juice.  Get 2 to 3 servings of low-fat and fat-free dairy each day. A serving is 8 ounces of milk, 1 cup of yogurt, or 1 ½ ounces of cheese.  Eat 6 to 8 servings of grains each day.  A serving is 1 slice of bread, 1 ounce of dry cereal, or ½ cup of cooked rice, pasta, or cooked cereal. Try to choose whole-grain products as much as possible.  Limit lean meat, poultry, and fish to 2 servings each day. A serving is 3 ounces, about the size of a deck of cards.  Eat 4 to 5 servings of nuts, seeds, and legumes (cooked dried beans, lentils, and split peas) each week. A serving is 1/3 cup of nuts, 2 tablespoons of seeds, or ½ cup of cooked beans or peas.  Limit fats and oils to 2 to 3 servings each day. A serving is 1 teaspoon of vegetable oil or 2 tablespoons of salad dressing.  Limit sweets and added sugars to 5 servings or less a week. A serving is 1 tablespoon jelly or jam, ½ cup sorbet, or 1 cup of lemonade.  Eat less than 2,300 milligrams (mg) of sodium a day. If you limit your sodium to 1,500 mg a day, you can lower your blood pressure even more.  Be aware that all of these are the suggested number of servings for people who eat 1,800 to 2,000 calories a day. Your recommended number of servings may be different if you need more or fewer calories. Tips for success   Start small. Do not try to make dramatic changes to your diet all at once. You might feel that you are missing out on your favorite foods and then be more likely to not follow the plan. Make small changes, and stick with them. Once those changes become habit, add a few more changes.  Try some of the following:  ? Make it a goal to eat a fruit or vegetable at every meal and at snacks. This will make it easy to get the recommended amount of fruits and vegetables each day. ? Try yogurt topped with fruit and nuts for a snack or healthy dessert. ? Add lettuce, tomato, cucumber, and onion to sandwiches. ? Combine a ready-made pizza crust with low-fat mozzarella cheese and lots of vegetable toppings. Try using tomatoes, squash, spinach, broccoli, carrots, cauliflower, and onions. ?  Have a variety of cut-up vegetables with a low-fat dip as an appetizer instead of chips and dip. ? Sprinkle sunflower seeds or chopped almonds over salads. Or try adding chopped walnuts or almonds to cooked vegetables. ? Try some vegetarian meals using beans and peas. Add garbanzo or kidney beans to salads. Make burritos and tacos with mashed mcguire beans or black beans. Where can you learn more? Go to https://[employer]. org and sign in to your [employer] account. Enter L226 in the [employer] box to learn more about \"DASH Diet: Care Instructions. \"     If you do not have an account, please click on the \"Sign Up Now\" link. Current as of: January 10, 2022               Content Version: 13.2  © 2105-8701 Healthwise, [employer]. Care instructions adapted under license by [facility] ([facility]). If you have questions about a medical condition or this instruction, always ask your healthcare professional. [place] [address] any warranty or liability for your use of this information. Personalized Preventive Plan for Eliza Hurtado - 4/6/2022  Medicare offers a range of preventive health benefits. Some of the tests and screenings are paid in full while other may be subject to a deductible, co-insurance, and/or copay. Some of these benefits include a comprehensive review of your medical history including lifestyle, illnesses that may run in your family, and various assessments and screenings as appropriate. After reviewing your medical record and screening and assessments performed today your provider may have ordered immunizations, labs, imaging, and/or referrals for you. A list of these orders (if applicable) as well as your Preventive Care list are included within your After Visit Summary for your review.     Other Preventive Recommendations:    · A preventive eye exam performed by an eye specialist is recommended every 1-2 years to screen for glaucoma; cataracts, macular degeneration, and other eye disorders. · A preventive dental visit is recommended every 6 months. · Try to get at least 150 minutes of exercise per week or 10,000 steps per day on a pedometer . · Order or download the FREE \"Exercise & Physical Activity: Your Everyday Guide\" from [employer]. Call 5-787.416.1391 or search [employer] online. · You need 7510-7223 mg of calcium and 9258-7309 IU of vitamin D per day. It is possible to meet your calcium requirement with diet alone, but a vitamin D supplement is usually necessary to meet this goal.  · When exposed to the sun, use a sunscreen that protects against both UVA and UVB radiation with an SPF of 30 or greater. Reapply every 2 to 3 hours or after sweating, drying off with a towel, or swimming. · Always wear a seat belt when traveling in a car. Always wear a helmet when riding a bicycle or motorcycle. Patient Education        Preventing Falls: Care Instructions  Your Care Instructions     Getting around your home safely can be a challenge if you have injuries or health problems that make it easy for you to fall. Loose rugs and furniture in walkways are among the dangers for many older people who have problems walking or who have poor eyesight. People who have conditions such as arthritis,osteoporosis, or dementia also have to be careful not to fall. You can make your home safer with a few simple measures. Follow-up care is a key part of your treatment and safety. Be sure to make and go to all appointments, and call your doctor if you are having problems. It's also a good idea to know your test results and keep alist of the medicines you take. How can you care for yourself at home? Taking care of yourself   Exercise regularly to improve your strength, muscle tone, and balance. Walk if you can. Swimming may be a good choice if you cannot walk easily.  Have your vision and hearing checked each year or any time you notice a change.  If you have trouble seeing and hearing, you might not be able to avoid objects and could lose your balance.  Know the side effects of the medicines you take. Ask your doctor or pharmacist whether the medicines you take can affect your balance. Sleeping pills or sedatives can affect your balance.  Limit the amount of alcohol you drink. Alcohol can impair your balance and other senses.  Ask your doctor whether calluses or corns on your feet need to be removed. If you wear loose-fitting shoes because of calluses or corns, you can lose your balance and fall.  Talk to your doctor if you have numbness in your feet.  You may get dizzy if you do not drink enough water. To prevent dehydration, drink plenty of fluids. Choose water and other clear liquids. If you have kidney, heart, or liver disease and have to limit fluids, talk with your doctor before you increase the amount of fluids you drink. Preventing falls at home   Remove raised doorway thresholds, throw rugs, and clutter. Repair loose carpet or raised areas in the floor. [facility] furniture and electrical cords to keep them out of walking paths.  Use nonskid floor wax, and wipe up spills right away, especially on ceramic tile floors.  If you use a walker or cane, put rubber tips on it. If you use crutches, clean the bottoms of them regularly with an abrasive pad, such as steel wool.  Keep your house well lit, especially [place], and outside walkways. Use night-lights in areas such as hallways and bathrooms. Add extra light switches or use remote switches (such as switches that go on or off when you clap your hands) to make it easier to turn lights on if you have to get up during the night.  Install sturdy handrails on stairways.  Move items in your cabinets so that the things you use a lot are on the lower shelves (about waist level).  Keep a cordless phone and a flashlight with new batteries by your bed.  If possible, put a phone in each of the main rooms of your house, or carry a cell phone in case you fall and cannot reach a phone. Or, you can wear a device around your neck or wrist. You push a button that sends a signal for help.  Wear low-heeled shoes that fit well and give your feet good support. Use footwear with nonskid soles. Check the heels and soles of your shoes for wear. Repair or replace worn heels or soles.  Do not wear socks without shoes on wood floors.  Walk on the grass when the sidewalks are slippery. If you live in an area that gets snow and ice in the winter, sprinkle salt on slippery steps and sidewalks. Or ask a family member or friend to do this for you. Preventing falls in the bath   Install grab bars and nonskid mats inside and outside your shower or tub and near the toilet and sinks.  Use shower chairs and bath benches.  Use a hand-held shower head that will allow you to sit while showering.  Get into a tub or shower by putting the weaker leg in first. Get out of a tub or shower with your strong side first.   Repair loose toilet seats and consider installing a raised toilet seat to make getting on and off the toilet easier.  Keep your bathroom door unlocked while you are in the shower. Where can you learn more? Go to https://ZoomypehyunewTDI Bassline.[employer]ipherals. org and sign in to your [employer] account. Enter 0476 [id number] [phone] in the [facility] box to learn more about \"Preventing Falls: Care Instructions. \"     If you do not have an account, please click on the \"Sign Up Now\" link. Current as of: September 8, 2021               Content Version: 13.2  © 9675-5925 Healthwise, [employer]. Care instructions adapted under license by [facility] ([facility]). If you have questions about a medical condition or this instruction, always ask your healthcare professional. Karolina Maloney any warranty or liability for your use of this information.

## 2022-08-30 ENCOUNTER — OFFICE VISIT (OUTPATIENT)
Dept: CARDIOLOGY CLINIC | Age: 72
End: 2022-08-30
Payer: MEDICARE

## 2022-08-30 VITALS
BODY MASS INDEX: 47.87 KG/M2 | SYSTOLIC BLOOD PRESSURE: 170 MMHG | HEART RATE: 54 BPM | OXYGEN SATURATION: 98 % | WEIGHT: 280.4 LBS | RESPIRATION RATE: 18 BRPM | HEIGHT: 64 IN | DIASTOLIC BLOOD PRESSURE: 99 MMHG

## 2022-08-30 DIAGNOSIS — I48.91 ATRIAL FIBRILLATION, UNSPECIFIED TYPE (HCC): Primary | ICD-10-CM

## 2022-08-30 PROCEDURE — 99213 OFFICE O/P EST LOW 20 MIN: CPT | Performed by: INTERNAL MEDICINE

## 2022-08-30 PROCEDURE — 1123F ACP DISCUSS/DSCN MKR DOCD: CPT | Performed by: INTERNAL MEDICINE

## 2022-08-30 PROCEDURE — 93000 ELECTROCARDIOGRAM COMPLETE: CPT | Performed by: INTERNAL MEDICINE

## 2022-08-30 NOTE — PROGRESS NOTES
Chief Complaint   Patient presents with    Atrial Fibrillation       Patient Active Problem List    Diagnosis Date Noted    Hypothyroidism     Diverticulosis 06/09/2021     Overview Note:     on colonoscopy      Tubular adenoma of colon     JESSICA (obstructive sleep apnea)      Overview Note:     On CPAP, wears nightly       Atrial fibrillation ([address] 02/13/2019     Overview Note:     A. CHADSVASC = 2      SOB (shortness of breath) 01/26/2019     Overview Note:     A. Echo 12/14/2018 (WS): normal  B. ETT-N :  1/3/2019 Alice Albright): normal nuke, deconditioned response to exercise      Hyperlipidemia 11/29/2018    Vitamin D deficiency 03/28/2018       Current Outpatient Medications   Medication Sig Dispense Refill    atorvastatin (LIPITOR) 40 MG tablet Take 1 tablet by mouth nightly 90 tablet 1    levothyroxine (SYNTHROID) 112 MCG tablet Take 1 tablet by mouth daily 90 tablet 1    metoprolol succinate (TOPROL XL) 25 MG extended release tablet Take 1 tablet by mouth at bedtime 90 tablet 1    apixaban (ELIQUIS) 5 MG TABS tablet Take 1 tablet by mouth 2 times daily 180 tablet 1    ammonium lactate (LAC-HYDRIN) 12 % lotion       polyethyl glycol-propyl glycol 0.4-0.3 % (SYSTANE) 0.4-0.3 % ophthalmic solution 2 drops as needed for Dry Eyes      Multiple Vitamins-Minerals (CENTRUM SILVER ULTRA WOMENS PO) Take by mouth      Calcium-Magnesium-Vitamin D (CALCIUM 1200+D3 PO) Take by mouth      Cholecalciferol (VITAMIN D3) 5000 units TABS Take by mouth daily        No current facility-administered medications for this visit.         No Known Allergies    Vitals:    08/30/22 0735   BP: (!) 170/99   Pulse: 54   Resp: 18   SpO2: 98%   Weight: 280 lb 6.4 oz (127.2 kg)   Height: 5' 4\" (1.626 m)       Social History     Socioeconomic History    Marital status:      Spouse name: Not on file    Number of children: Not on file    Years of education: Not on file    Highest education level: Not on file   Occupational History    Not on file Tobacco Use    Smoking status: Never    Smokeless tobacco: Never   Vaping Use    Vaping Use: Never used   Substance and Sexual Activity    Alcohol use: No    Drug use: No    Sexual activity: Not on file   Other Topics Concern    Not on file   Social History Narrative    Patient is self-employed. She does vocational evaluations. She is contracted with [employer]. Social Determinants of Health     Financial Resource Strain: Not on file   Food Insecurity: Not on file   Transportation Needs: Not on file   Physical Activity: Insufficiently Active    Days of Exercise per Week: 2 days    Minutes of Exercise per Session: 50 min   Stress: Not on file   Social Connections: Not on file   Intimate Partner Violence: Not on file   Housing Stability: Not on file       Family History   Problem Relation Age of Onset    Stroke Mother 35    Rheum Arthritis Mother     COPD Father     Colon Polyps Father     Mult Sclerosis Father 48    Hypothyroidism Brother     Rheum Arthritis Brother     Hemochromatosis Brother     Stroke Brother     Psoriasis Brother     Arthritis Brother     COPD Brother     Other Brother         Venous stasis    High Blood Pressure Brother     Arthritis Brother     Glaucoma Brother     Hypothyroidism Maternal Grandmother     Deep Vein Thrombosis Maternal Grandmother     Heart Disease Maternal Grandfather     Atrial Fibrillation Paternal Grandmother     Hypothyroidism Paternal Grandmother     Colon Cancer Paternal Grandfather     Ovarian Cancer Maternal Aunt [de-identified]         SUBJECTIVE: Juan Booth presents to the office today for followup of afib. She complains of NO  dyspnea and fatigue and denies   chest pain and paroxysmal nocturnal dyspnea. No hx cva, dm,htn,bleeds, afib. Not exercising. Is using her CPAP. No alcohol. No neuro symptoms. Up 15 lbs since last visit.    Reduced her BB to qd dosing due to fatigue Compliant with NOAC            Physical Exam   BP (!) 170/99   Pulse 54   Resp 18   Ht 5' 4\" (1.626 return to our office.   Return visit adeline Cruz M.D  [address]

## 2022-09-15 DIAGNOSIS — E78.2 MIXED HYPERLIPIDEMIA: ICD-10-CM

## 2022-09-15 DIAGNOSIS — E03.9 ACQUIRED HYPOTHYROIDISM: ICD-10-CM

## 2022-09-15 RX ORDER — LEVOTHYROXINE SODIUM 112 UG/1
TABLET ORAL
Qty: 90 TABLET | Refills: 1 | Status: SHIPPED | OUTPATIENT
Start: 2022-09-15

## 2022-09-15 RX ORDER — ATORVASTATIN CALCIUM 40 MG/1
TABLET, FILM COATED ORAL
Qty: 90 TABLET | Refills: 1 | Status: SHIPPED | OUTPATIENT
Start: 2022-09-15

## 2022-09-15 NOTE — TELEPHONE ENCOUNTER
Medication Refill Request    LOV 4/6/2022  NOV 10/12/2022    Lab Results   Component Value Date    CREATININE 0.8 04/01/2022

## 2022-10-03 DIAGNOSIS — I48.0 PAROXYSMAL ATRIAL FIBRILLATION (HCC): ICD-10-CM

## 2022-10-03 DIAGNOSIS — I10 ESSENTIAL HYPERTENSION: ICD-10-CM

## 2022-10-03 RX ORDER — APIXABAN 5 MG/1
TABLET, FILM COATED ORAL
Qty: 180 TABLET | Refills: 1 | Status: SHIPPED | OUTPATIENT
Start: 2022-10-03

## 2022-10-03 RX ORDER — METOPROLOL SUCCINATE 25 MG/1
TABLET, EXTENDED RELEASE ORAL
Qty: 90 TABLET | Refills: 1 | Status: SHIPPED | OUTPATIENT
Start: 2022-10-03

## 2022-10-11 DIAGNOSIS — E78.2 MIXED HYPERLIPIDEMIA: ICD-10-CM

## 2022-10-11 DIAGNOSIS — I10 ESSENTIAL HYPERTENSION: ICD-10-CM

## 2022-10-11 DIAGNOSIS — R73.9 ELEVATED SERUM GLUCOSE: ICD-10-CM

## 2022-10-11 DIAGNOSIS — E03.9 ACQUIRED HYPOTHYROIDISM: ICD-10-CM

## 2022-10-11 LAB
ALBUMIN SERPL-MCNC: 4.1 G/DL (ref 3.5–5.2)
ALP BLD-CCNC: 98 U/L (ref 35–104)
ALT SERPL-CCNC: 18 U/L (ref 0–32)
ANION GAP SERPL CALCULATED.3IONS-SCNC: 12 MMOL/L (ref 7–16)
AST SERPL-CCNC: 19 U/L (ref 0–31)
BASOPHILS ABSOLUTE: 0.04 E9/L (ref 0–0.2)
BASOPHILS RELATIVE PERCENT: 0.5 % (ref 0–2)
BILIRUB SERPL-MCNC: 0.3 MG/DL (ref 0–1.2)
BUN BLDV-MCNC: 15 MG/DL (ref 6–23)
CALCIUM SERPL-MCNC: 8.8 MG/DL (ref 8.6–10.2)
CHLORIDE BLD-SCNC: 107 MMOL/L (ref 98–107)
CHOLESTEROL, TOTAL: 144 MG/DL (ref 0–199)
CO2: 23 MMOL/L (ref 22–29)
CREAT SERPL-MCNC: 0.9 MG/DL (ref 0.5–1)
EOSINOPHILS ABSOLUTE: 0.33 E9/L (ref 0.05–0.5)
EOSINOPHILS RELATIVE PERCENT: 4 % (ref 0–6)
GFR AFRICAN AMERICAN: >60
GFR NON-AFRICAN AMERICAN: >60 ML/MIN/1.73
GLUCOSE BLD-MCNC: 98 MG/DL (ref 74–99)
HBA1C MFR BLD: 5.9 % (ref 4–5.6)
HCT VFR BLD CALC: 43.4 % (ref 34–48)
HDLC SERPL-MCNC: 46 MG/DL
HEMOGLOBIN: 13.6 G/DL (ref 11.5–15.5)
IMMATURE GRANULOCYTES #: 0.05 E9/L
IMMATURE GRANULOCYTES %: 0.6 % (ref 0–5)
LDL CHOLESTEROL CALCULATED: 70 MG/DL (ref 0–99)
LYMPHOCYTES ABSOLUTE: 1.83 E9/L (ref 1.5–4)
LYMPHOCYTES RELATIVE PERCENT: 22 % (ref 20–42)
MCH RBC QN AUTO: 29 PG (ref 26–35)
MCHC RBC AUTO-ENTMCNC: 31.3 % (ref 32–34.5)
MCV RBC AUTO: 92.5 FL (ref 80–99.9)
MONOCYTES ABSOLUTE: 0.68 E9/L (ref 0.1–0.95)
MONOCYTES RELATIVE PERCENT: 8.2 % (ref 2–12)
NEUTROPHILS ABSOLUTE: 5.37 E9/L (ref 1.8–7.3)
NEUTROPHILS RELATIVE PERCENT: 64.7 % (ref 43–80)
PDW BLD-RTO: 14.2 FL (ref 11.5–15)
PLATELET # BLD: 222 E9/L (ref 130–450)
PMV BLD AUTO: 12 FL (ref 7–12)
POTASSIUM SERPL-SCNC: 5 MMOL/L (ref 3.5–5)
RBC # BLD: 4.69 E12/L (ref 3.5–5.5)
SODIUM BLD-SCNC: 142 MMOL/L (ref 132–146)
TOTAL PROTEIN: 7.1 G/DL (ref 6.4–8.3)
TRIGL SERPL-MCNC: 142 MG/DL (ref 0–149)
TSH SERPL DL<=0.05 MIU/L-ACNC: 3.33 UIU/ML (ref 0.27–4.2)
VLDLC SERPL CALC-MCNC: 28 MG/DL
WBC # BLD: 8.3 E9/L (ref 4.5–11.5)

## 2022-10-12 ENCOUNTER — OFFICE VISIT (OUTPATIENT)
Dept: FAMILY MEDICINE CLINIC | Age: 72
End: 2022-10-12
Payer: MEDICARE

## 2022-10-12 VITALS
HEART RATE: 64 BPM | BODY MASS INDEX: 47.99 KG/M2 | DIASTOLIC BLOOD PRESSURE: 74 MMHG | WEIGHT: 279.6 LBS | TEMPERATURE: 97.5 F | SYSTOLIC BLOOD PRESSURE: 100 MMHG | OXYGEN SATURATION: 97 %

## 2022-10-12 DIAGNOSIS — R73.03 PREDIABETES: ICD-10-CM

## 2022-10-12 DIAGNOSIS — E55.9 VITAMIN D DEFICIENCY: ICD-10-CM

## 2022-10-12 DIAGNOSIS — E03.9 ACQUIRED HYPOTHYROIDISM: ICD-10-CM

## 2022-10-12 DIAGNOSIS — I48.91 ATRIAL FIBRILLATION, UNSPECIFIED TYPE (HCC): ICD-10-CM

## 2022-10-12 DIAGNOSIS — Z23 FLU VACCINE NEED: ICD-10-CM

## 2022-10-12 DIAGNOSIS — I10 ESSENTIAL HYPERTENSION: Primary | ICD-10-CM

## 2022-10-12 DIAGNOSIS — Z12.31 ENCOUNTER FOR SCREENING MAMMOGRAM FOR MALIGNANT NEOPLASM OF BREAST: ICD-10-CM

## 2022-10-12 DIAGNOSIS — E78.1 PURE HYPERTRIGLYCERIDEMIA: ICD-10-CM

## 2022-10-12 DIAGNOSIS — G47.33 OSA (OBSTRUCTIVE SLEEP APNEA): ICD-10-CM

## 2022-10-12 PROCEDURE — 1123F ACP DISCUSS/DSCN MKR DOCD: CPT | Performed by: FAMILY MEDICINE

## 2022-10-12 PROCEDURE — 99214 OFFICE O/P EST MOD 30 MIN: CPT | Performed by: FAMILY MEDICINE

## 2022-10-12 SDOH — ECONOMIC STABILITY: FOOD INSECURITY: WITHIN THE PAST 12 MONTHS, YOU WORRIED THAT YOUR FOOD WOULD RUN OUT BEFORE YOU GOT MONEY TO BUY MORE.: NEVER TRUE

## 2022-10-12 SDOH — ECONOMIC STABILITY: FOOD INSECURITY: WITHIN THE PAST 12 MONTHS, THE FOOD YOU BOUGHT JUST DIDN'T LAST AND YOU DIDN'T HAVE MONEY TO GET MORE.: NEVER TRUE

## 2022-10-12 ASSESSMENT — ENCOUNTER SYMPTOMS
NAUSEA: 0
BLOOD IN STOOL: 0
DIARRHEA: 0
ABDOMINAL PAIN: 0
VOMITING: 0
WHEEZING: 0
SHORTNESS OF BREATH: 0
EYE PAIN: 0
COUGH: 0
CONSTIPATION: 0

## 2022-10-12 ASSESSMENT — SOCIAL DETERMINANTS OF HEALTH (SDOH): HOW HARD IS IT FOR YOU TO PAY FOR THE VERY BASICS LIKE FOOD, HOUSING, MEDICAL CARE, AND HEATING?: NOT HARD AT ALL

## 2022-10-12 NOTE — PROGRESS NOTES
10/13/2022    Jacki Callaway is a 73 y.o. female here for:    Chief Complaint   Patient presents with    Hypertension     6 month check    Hyperlipidemia     6 month check    Hypothyroidism     6 month check    Atrial Fibrillation     6 month check    Sleep Apnea     6 month check        HPI:  HTN   - On metoprolol succinate 25 mg HS. Reports compliance with medication. Denies side effects of medication.   - Denies double vision, blurry vision, chest pain, palpitations, lightheadedness, dizziness, and syncope. HLD   - On atorvastatin 40 mg HS. Reports compliance with medication. Denies side effects on medication.  - Diet: eats everything    - Exercise: was swimming at the [facility] twice weekly, but is not currently swimming    The 10-year ASCVD risk score (Shirley DK, et al., 2019) is: 5%    Values used to calculate the score:      Age: 73 years      Sex: Female      Is Non- : No      Diabetic: No      Tobacco smoker: No      Systolic Blood Pressure: 395 mmHg      Is BP treated: No      HDL Cholesterol: 46 mg/dL      Total Cholesterol: 144 mg/dL    Hypothyroidism   - On Synthroid 112 mcg QD. Reports compliance with medication. Denies side effects of medication.   - Denies constipation, palpitations, and significant weight changes. Atrial fibrillation   - On Eliquis 5 mg BID. Reports compliance with medication. Denies side effects of medication.   - Denies nosebleeds or blood in stool. JESSICA  - On CPAP. Wears CPAP nightly. - Feels refreshed after using it. - Follows with Sleep Medicine, Dr. Carly Cabrera.      Current Outpatient Medications   Medication Sig Dispense Refill    ELIQUIS 5 MG TABS tablet TAKE 1 TABLET TWICE A  tablet 1    metoprolol succinate (TOPROL XL) 25 MG extended release tablet TAKE 1 TABLET AT BEDTIME 90 tablet 1    levothyroxine (SYNTHROID) 112 MCG tablet TAKE 1 TABLET DAILY 90 tablet 1    atorvastatin (LIPITOR) 40 MG tablet TAKE 1 TABLET NIGHTLY 90 tablet 1 ammonium lactate (LAC-HYDRIN) 12 % lotion       polyethyl glycol-propyl glycol 0.4-0.3 % (SYSTANE) 0.4-0.3 % ophthalmic solution 2 drops as needed for Dry Eyes      Multiple Vitamins-Minerals (CENTRUM SILVER ULTRA WOMENS PO) Take by mouth      Calcium-Magnesium-Vitamin D (CALCIUM 1200+D3 PO) Take by mouth      Cholecalciferol (VITAMIN D3) 5000 units TABS Take by mouth daily        No current facility-administered medications for this visit.       No Known Allergies    Past Medical & Surgical History:      Diagnosis Date    A-fib ([address]     Diverticulosis 2021    on colonoscopy    Hyperlipidemia     Hypothyroidism     JESSICA (obstructive sleep apnea)     On CPAP, wears nightly; follows with Sleep Medicine, Dr. Arely Steinberg    Prediabetes 10/11/2022    Tubular adenoma of colon 2021    on colonoscopy     Past Surgical History:   Procedure Laterality Date     SECTION N/A     x 2    COLONOSCOPY N/A 2021    COLONOSCOPY WITH BIOPSY performed by Geovany Sosa DO at [address] [place] [address], TOTAL ABDOMINAL (CERVIX REMOVED)      WISDOM TOOTH EXTRACTION         Family History:      Problem Relation Age of Onset    Stroke Mother 35    Rheum Arthritis Mother     COPD Father     Colon Polyps Father     Mult Sclerosis Father 48    Hypothyroidism Brother     Rheum Arthritis Brother     Hemochromatosis Brother     Stroke Brother     Psoriasis Brother     Arthritis Brother     COPD Brother     Other Brother         Venous stasis    High Blood Pressure Brother     Arthritis Brother     Glaucoma Brother     Hypothyroidism Maternal Grandmother     Deep Vein Thrombosis Maternal Grandmother     Heart Disease Maternal Grandfather     Atrial Fibrillation Paternal Grandmother     Hypothyroidism Paternal Grandmother     Colon Cancer Paternal Grandfather     Ovarian Cancer Maternal Aunt [de-identified]       Social History:  Social History     Tobacco Use    Smoking status: Never    Smokeless tobacco: Never   Substance Use Topics Alcohol use: No       Review of Systems   Constitutional:  Negative for chills and fever. Eyes:  Negative for pain and visual disturbance. Respiratory:  Negative for cough, shortness of breath and wheezing. Cardiovascular:  Negative for chest pain, palpitations and leg swelling. Gastrointestinal:  Negative for abdominal pain, blood in stool, constipation, diarrhea, nausea and vomiting. Neurological:  Negative for dizziness, syncope and light-headedness. Psychiatric/Behavioral:  Negative for dysphoric mood. The patient is not nervous/anxious. BP Readings from Last 3 Encounters:   10/12/22 100/74   08/30/22 (!) 170/99   04/06/22 112/80       VS:  /74 (Site: Left Upper Arm, Position: Sitting, Cuff Size: Large Adult)   Pulse 64   Temp 97.5 °F (36.4 °C)   Wt 279 lb 9.6 oz (126.8 kg)   LMP  (LMP Unknown)   SpO2 97%   BMI 47.99 kg/m²     Physical Exam  Vitals reviewed. Constitutional:       General: She is awake. She is not in acute distress. Appearance: She is well-developed and well-groomed. She is obese. She is not ill-appearing, toxic-appearing or diaphoretic. Neck:      Vascular: No carotid bruit. Cardiovascular:      Rate and Rhythm: Normal rate and regular rhythm. Heart sounds: S1 normal and S2 normal. No murmur heard. Pulmonary:      Breath sounds: No decreased breath sounds, wheezing, rhonchi or rales. Abdominal:      General: Bowel sounds are normal. There is no distension. Palpations: Abdomen is soft. Tenderness: no abdominal tenderness There is no guarding or rebound. Musculoskeletal:      Cervical back: Neck supple. Right lower leg: No tenderness. No edema. Left lower leg: No tenderness. No edema. Skin:     General: Skin is warm and dry. Neurological:      General: No focal deficit present. Mental Status: She is alert.    Psychiatric:         Attention and Perception: Attention normal.         Mood and Affect: Mood normal. Speech: Speech normal.         Behavior: Behavior normal. Behavior is cooperative. Thought Content: Thought content normal.         Cognition and Memory: Cognition normal.         Judgment: Judgment normal.       Assessment/Plan:  1. Essential hypertension  Well-controlled. Continue metoprolol succinate 25 mg HS.  - CBC with Auto Differential; Future  - Comprehensive Metabolic Panel; Future  - MICROALBUMIN / CREATININE URINE RATIO; Future    2. Pure hypertriglyceridemia  Well-controlled. Lipid panel from 10/11/2022 within normal range. Continue atorvastatin 40 mg HS.   - LIPID PANEL; Future    3. Acquired hypothyroidism  Well-controlled. TSH from 10/11/2022 within normal range. Continue Synthroid 112 mcg QD.   - TSH; Future    4. Atrial fibrillation, unspecified type ([address]  Stable. Continue Eliquis 5 mg BID.     5. JESSICA (obstructive sleep apnea)  Stable. Continue CPAP. 6. Prediabetes  - Hemoglobin A1C; Future    7. Vitamin D deficiency  - Vitamin D 25 Hydroxy; Future    8. Encounter for screening mammogram for malignant neoplasm of breast  - OPAL DIGITAL SCREEN BILATERAL PER PROTOCOL; Future    9. Flu vaccine need  - Influenza, FLUAD, (age 72 y+), IM, Preservative Free, 0.5 mL      Return in about 6 months (around 2023) for 6 month med check.       Janet Enriquez, DO  Family Medicine

## 2022-10-13 PROCEDURE — 90694 VACC AIIV4 NO PRSRV 0.5ML IM: CPT | Performed by: FAMILY MEDICINE

## 2022-10-13 PROCEDURE — G0008 ADMIN INFLUENZA VIRUS VAC: HCPCS | Performed by: FAMILY MEDICINE

## 2023-03-14 DIAGNOSIS — E78.2 MIXED HYPERLIPIDEMIA: ICD-10-CM

## 2023-03-14 DIAGNOSIS — E03.9 ACQUIRED HYPOTHYROIDISM: ICD-10-CM

## 2023-03-14 RX ORDER — TERBINAFINE HYDROCHLORIDE 250 MG/1
TABLET ORAL
COMMUNITY
Start: 2023-01-02

## 2023-03-14 RX ORDER — LEVOTHYROXINE SODIUM 112 UG/1
TABLET ORAL
Qty: 90 TABLET | Refills: 1 | Status: SHIPPED | OUTPATIENT
Start: 2023-03-14

## 2023-03-14 RX ORDER — ATORVASTATIN CALCIUM 40 MG/1
TABLET, FILM COATED ORAL
Qty: 90 TABLET | Refills: 1 | Status: SHIPPED | OUTPATIENT
Start: 2023-03-14

## 2023-03-14 NOTE — TELEPHONE ENCOUNTER
Medication Refill Request    LOV 10/12/2022  NOV 4/12/2023    Lab Results   Component Value Date    CREATININE 0.9 10/11/2022

## 2023-04-03 DIAGNOSIS — I48.0 PAROXYSMAL ATRIAL FIBRILLATION (HCC): ICD-10-CM

## 2023-04-03 DIAGNOSIS — I10 ESSENTIAL HYPERTENSION: ICD-10-CM

## 2023-04-03 RX ORDER — METOPROLOL SUCCINATE 25 MG/1
TABLET, EXTENDED RELEASE ORAL
Qty: 90 TABLET | Refills: 3 | Status: SHIPPED | OUTPATIENT
Start: 2023-04-03

## 2023-06-05 ENCOUNTER — OFFICE VISIT (OUTPATIENT)
Dept: PODIATRY | Age: 73
End: 2023-06-05
Payer: MEDICARE

## 2023-06-05 VITALS
WEIGHT: 260 LBS | DIASTOLIC BLOOD PRESSURE: 78 MMHG | TEMPERATURE: 98.2 F | BODY MASS INDEX: 44.63 KG/M2 | SYSTOLIC BLOOD PRESSURE: 126 MMHG

## 2023-06-05 DIAGNOSIS — M79.674 PAIN IN TOE OF RIGHT FOOT: ICD-10-CM

## 2023-06-05 DIAGNOSIS — M79.675 PAIN IN LEFT TOE(S): ICD-10-CM

## 2023-06-05 DIAGNOSIS — B35.1 TINEA UNGUIUM: Primary | ICD-10-CM

## 2023-06-05 PROCEDURE — 99203 OFFICE O/P NEW LOW 30 MIN: CPT | Performed by: PODIATRIST

## 2023-06-05 PROCEDURE — 1123F ACP DISCUSS/DSCN MKR DOCD: CPT | Performed by: PODIATRIST

## 2023-06-05 RX ORDER — OXICONAZOLE NITRATE 10 MG/ML
LOTION TOPICAL DAILY
Qty: 60 ML | Refills: 2 | Status: SHIPPED | OUTPATIENT
Start: 2023-06-05 | End: 2023-08-04

## 2023-06-06 NOTE — PROGRESS NOTES
lotion; Apply topically daily           Plan:   Pt was evaluated and examined. Patient was given personalized discharge instructions. Pt will follow up in 9 weeks or sooner if any problems arise. Diagnosis was discussed with the pt and all of their questions were answered in detail. Proper foot hygiene and care was discussed with the pt. Patient to check feet daily and contact the office with any questions/problems/concerns. Other comorbidity noted and will be managed by PCP. Pain waiver discussed with patient and confirmed.    6/5/2023      Electronically signed by Thom Briones DPM on 6/6/2023 at 4:34 PM  6/5/2023

## 2023-07-05 ENCOUNTER — OFFICE VISIT (OUTPATIENT)
Dept: ORTHOPEDIC SURGERY | Age: 73
End: 2023-07-05

## 2023-07-05 VITALS — HEIGHT: 64 IN | WEIGHT: 260 LBS | BODY MASS INDEX: 44.39 KG/M2

## 2023-07-05 DIAGNOSIS — M17.11 PRIMARY OSTEOARTHRITIS OF RIGHT KNEE: Primary | ICD-10-CM

## 2023-07-05 DIAGNOSIS — M17.11 ARTHROPATHY OF RIGHT KNEE: ICD-10-CM

## 2023-07-05 RX ORDER — METHYLPREDNISOLONE 4 MG/1
TABLET ORAL
Qty: 1 KIT | Refills: 0 | Status: SHIPPED | OUTPATIENT
Start: 2023-07-05

## 2023-07-05 NOTE — PATIENT INSTRUCTIONS
*At this time I have recommended an oral steroid, Medrol Dose Larry as instructed. I did discuss potential side effect such as GI upset, mood changes, depression, anxiety, change in sleep habits. The patient develops any of these signs or symptoms they will call the office immediately and we will discontinue the medication in appropriate fashion. They are aware that he should not use any other oral anti-inflammatories while on the oral steroids. They can use Tylenol 500 mg with the directions to take 1 tablet by mouth every 8 hours as needed for pain. *Patient can use VOLTAREN GEL 1% (DICLOFENAC SODIUM) Apply 4 grams twice daily to the affected knee as needed, which can be obtained over the counter. ,

## 2023-07-05 NOTE — PROGRESS NOTES
[facility]  New Patient Note      CHIEF COMPLAINT:   Chief Complaint   Patient presents with    Knee Pain     Right knee pain and swelling x 1 month, no known JANNET. Was gardening and had recent vacations where she was doing a lot of walking. HISTORY OF PRESENT ILLNESS:                The patient is a 81 y.o. female who presents today with complaints of right knee pain and swelling x1 month with no known mechanism of injury. She states she was gardening recently and has gone on a few occasions where she was doing a lot of walking and is unsure if this contributed to her pain. She has difficulty localizing her pain states it is medial lateral and deep to the patella. Pain is worse with position changes, sitting, standing, walking, sleeping. She denies any numbness, tingling, loss of sensation or radiation of symptoms into the toes. She has tried at home therapies of ibuprofen and then switch to Tylenol recently due to her Eliquis. She did try compression but states it made the pain worse. She is never injured this knee in the past.       Past Medical History:        Diagnosis Date    A-fib ([address] [facility])     Diverticulosis 2021    on colonoscopy    Hyperlipidemia     Hypothyroidism     JESSICA (obstructive sleep apnea)     On CPAP, wears nightly; follows with Sleep Medicine, Dr. Jerilyn Moss    Prediabetes 10/11/2022    Tubular adenoma of colon 2021    on colonoscopy     Past Surgical History:        Procedure Laterality Date     SECTION N/A     x 2    COLONOSCOPY N/A 2021    COLONOSCOPY WITH BIOPSY performed by Guillermina Galvan DO at [facility], TOTAL ABDOMINAL (CERVIX REMOVED)      WISDOM TOOTH EXTRACTION       Current Medications:   No current facility-administered medications for this visit. Allergies:  Patient has no known allergies. Social History:   TOBACCO:   reports that she has never smoked.  She has never used smokeless tobacco.  ETOH:

## 2023-07-25 ENCOUNTER — OFFICE VISIT (OUTPATIENT)
Dept: ORTHOPEDIC SURGERY | Age: 73
End: 2023-07-25
Payer: MEDICARE

## 2023-07-25 VITALS — BODY MASS INDEX: 44.39 KG/M2 | WEIGHT: 260 LBS | HEIGHT: 64 IN

## 2023-07-25 DIAGNOSIS — M17.11 PRIMARY OSTEOARTHRITIS OF RIGHT KNEE: Primary | ICD-10-CM

## 2023-07-25 PROCEDURE — 20610 DRAIN/INJ JOINT/BURSA W/O US: CPT | Performed by: PHYSICIAN ASSISTANT

## 2023-07-25 PROCEDURE — 99999 PR OFFICE/OUTPT VISIT,PROCEDURE ONLY: CPT | Performed by: PHYSICIAN ASSISTANT

## 2023-07-25 RX ORDER — LIDOCAINE HYDROCHLORIDE 10 MG/ML
4 INJECTION, SOLUTION INFILTRATION; PERINEURAL ONCE
Status: COMPLETED | OUTPATIENT
Start: 2023-07-25 | End: 2023-07-25

## 2023-07-25 RX ORDER — TRIAMCINOLONE ACETONIDE 40 MG/ML
40 INJECTION, SUSPENSION INTRA-ARTICULAR; INTRAMUSCULAR ONCE
Status: COMPLETED | OUTPATIENT
Start: 2023-07-25 | End: 2023-07-25

## 2023-07-25 RX ADMIN — TRIAMCINOLONE ACETONIDE 40 MG: 40 INJECTION, SUSPENSION INTRA-ARTICULAR; INTRAMUSCULAR at 13:34

## 2023-07-25 RX ADMIN — LIDOCAINE HYDROCHLORIDE 4 ML: 10 INJECTION, SOLUTION INFILTRATION; PERINEURAL at 13:33

## 2023-07-25 NOTE — PROGRESS NOTES
Established Patient Follow Up  [facility]    Chief Complaint   Patient presents with    Knee Pain     Pt presents this PM with c/o pain in R knee. Requests an injection. Pain is in medial knee. Subjective:  Pt is a 81 y.o. female, established pt who presents today for follow up of right knee arthritis. Please refer to the last clinic note from 7/5/23 as none of the patient's past medical hx has changed. Pt reports continual medial knee pain that has not improved despite conservative tx of voltaran, oral steroids and HEP. She is using cane for ambulation. Objective: There were no vitals filed for this visit. Pt is alert and oriented x 3, NAD, sitting comfortable in the exam room today. Painful and limited ROM of the knee, normal patellar tracking. Radiology Imaging:  No new imaging at today's visit       Procedure:  Procedure Note: right Knee Cortisone injection     The right Knee was identified as the injection site. The risk and benefits of a cortisone injection were explained and the patient consented to the injection. Under sterile conditions, using ethyl chloride spray , right knee was injected with a mixture of 40 mg of Kenalog and 4 mL of 1% Lidocaine without complication. A sterile bandage was applied. The patient tolerated the procedure well. Pt reported improvement of pain and ROM following the procedure. Assessment:  Encounter Diagnosis   Name Primary? Primary osteoarthritis of right knee Yes       Plan:  Pt returns to the clinic today for consider of right knee IA cortisone injection. I think this is appropriate as she has tried oral medications including steroids, topical medications and HEP. After obtaining verbal consent, IA Cortisone injection was given and pt tolerated the procedure well. I will see pt back on PRN basis. As a reminder, we cannot do another IA Cortisone injection in this right knee for 3-4 months.        Electronically signed by

## 2023-08-07 ENCOUNTER — OFFICE VISIT (OUTPATIENT)
Dept: PODIATRY | Age: 73
End: 2023-08-07
Payer: MEDICARE

## 2023-08-07 VITALS
DIASTOLIC BLOOD PRESSURE: 78 MMHG | SYSTOLIC BLOOD PRESSURE: 124 MMHG | BODY MASS INDEX: 44.63 KG/M2 | TEMPERATURE: 97 F | WEIGHT: 260 LBS

## 2023-08-07 DIAGNOSIS — B35.1 TINEA UNGUIUM: Primary | ICD-10-CM

## 2023-08-07 DIAGNOSIS — M79.675 PAIN IN LEFT TOE(S): ICD-10-CM

## 2023-08-07 DIAGNOSIS — M79.674 PAIN IN TOE OF RIGHT FOOT: ICD-10-CM

## 2023-08-07 PROCEDURE — 99212 OFFICE O/P EST SF 10 MIN: CPT | Performed by: PODIATRIST

## 2023-08-07 PROCEDURE — 1123F ACP DISCUSS/DSCN MKR DOCD: CPT | Performed by: PODIATRIST

## 2023-08-07 NOTE — PROGRESS NOTES
questions were answered in detail. Proper foot hygiene and care was discussed with the pt. Patient to check feet daily and contact the office with any questions/problems/concerns. Other comorbidity noted and will be managed by PCP. Pain waiver discussed with patient and confirmed.    8/7/2023      Electronically signed by Tiny Martin DPM on 8/7/2023 at 8:44 AM  8/7/2023

## 2023-08-20 DIAGNOSIS — I48.0 PAROXYSMAL ATRIAL FIBRILLATION (HCC): ICD-10-CM

## 2023-08-21 RX ORDER — APIXABAN 5 MG/1
TABLET, FILM COATED ORAL
Qty: 180 TABLET | Refills: 1 | Status: SHIPPED | OUTPATIENT
Start: 2023-08-21

## 2023-08-21 NOTE — TELEPHONE ENCOUNTER
Medication Refill Request    LOV 10/12/2022  NOV 9/6/2023    Lab Results   Component Value Date    CREATININE 0.9 10/11/2022

## 2023-09-01 DIAGNOSIS — E78.1 PURE HYPERTRIGLYCERIDEMIA: ICD-10-CM

## 2023-09-01 DIAGNOSIS — I10 ESSENTIAL HYPERTENSION: ICD-10-CM

## 2023-09-01 DIAGNOSIS — E03.9 ACQUIRED HYPOTHYROIDISM: ICD-10-CM

## 2023-09-01 DIAGNOSIS — E55.9 VITAMIN D DEFICIENCY: ICD-10-CM

## 2023-09-01 DIAGNOSIS — R73.03 PREDIABETES: ICD-10-CM

## 2023-09-01 LAB
ABSOLUTE IMMATURE GRANULOCYTE: 0.04 K/UL (ref 0–0.58)
ALBUMIN SERPL-MCNC: 4.3 G/DL (ref 3.5–5.2)
ALP BLD-CCNC: 80 U/L (ref 35–104)
ALT SERPL-CCNC: 13 U/L (ref 0–32)
ANION GAP SERPL CALCULATED.3IONS-SCNC: 14 MMOL/L (ref 7–16)
AST SERPL-CCNC: 19 U/L (ref 0–31)
BASOPHILS ABSOLUTE: 0.04 K/UL (ref 0–0.2)
BASOPHILS RELATIVE PERCENT: 0 % (ref 0–2)
BILIRUB SERPL-MCNC: 0.4 MG/DL (ref 0–1.2)
BUN BLDV-MCNC: 19 MG/DL (ref 6–23)
CALCIUM SERPL-MCNC: 9.1 MG/DL (ref 8.6–10.2)
CHLORIDE BLD-SCNC: 103 MMOL/L (ref 98–107)
CHOLESTEROL: 236 MG/DL
CO2: 24 MMOL/L (ref 22–29)
CREAT SERPL-MCNC: 0.9 MG/DL (ref 0.5–1)
CREATININE URINE: 78.4 MG/DL (ref 29–226)
EOSINOPHILS ABSOLUTE: 0.58 K/UL (ref 0.05–0.5)
EOSINOPHILS RELATIVE PERCENT: 7 % (ref 0–6)
GFR SERPL CREATININE-BSD FRML MDRD: >60 ML/MIN/1.73M2
GLUCOSE BLD-MCNC: 86 MG/DL (ref 74–99)
HBA1C MFR BLD: 6 % (ref 4–5.6)
HCT VFR BLD CALC: 44.8 % (ref 34–48)
HDLC SERPL-MCNC: 45 MG/DL
HEMOGLOBIN: 13.9 G/DL (ref 11.5–15.5)
IMMATURE GRANULOCYTES: 0 % (ref 0–5)
LDL CHOLESTEROL: 144 MG/DL
LYMPHOCYTES ABSOLUTE: 1.73 K/UL (ref 1.5–4)
LYMPHOCYTES RELATIVE PERCENT: 19 % (ref 20–42)
MCH RBC QN AUTO: 28.5 PG (ref 26–35)
MCHC RBC AUTO-ENTMCNC: 31 G/DL (ref 32–34.5)
MCV RBC AUTO: 91.8 FL (ref 80–99.9)
MICROALBUMIN/CREAT 24H UR: 18 MG/L (ref 0–19)
MICROALBUMIN/CREAT UR-RTO: 22 MCG/MG CREAT (ref 0–30)
MONOCYTES ABSOLUTE: 0.76 K/UL (ref 0.1–0.95)
MONOCYTES RELATIVE PERCENT: 9 % (ref 2–12)
NEUTROPHILS ABSOLUTE: 5.81 K/UL (ref 1.8–7.3)
NEUTROPHILS RELATIVE PERCENT: 65 % (ref 43–80)
PDW BLD-RTO: 14.4 % (ref 11.5–15)
PLATELET # BLD: 226 K/UL (ref 130–450)
PMV BLD AUTO: 11.8 FL (ref 7–12)
POTASSIUM SERPL-SCNC: 4.8 MMOL/L (ref 3.5–5)
RBC # BLD: 4.88 M/UL (ref 3.5–5.5)
SODIUM BLD-SCNC: 141 MMOL/L (ref 132–146)
TOTAL PROTEIN: 7.4 G/DL (ref 6.4–8.3)
TRIGL SERPL-MCNC: 234 MG/DL
TSH SERPL DL<=0.05 MIU/L-ACNC: 7.16 UIU/ML (ref 0.27–4.2)
VITAMIN D 25-HYDROXY: 25.8 NG/ML (ref 30–100)
VLDLC SERPL CALC-MCNC: 47 MG/DL
WBC # BLD: 9 K/UL (ref 4.5–11.5)

## 2023-09-03 SDOH — ECONOMIC STABILITY: FOOD INSECURITY: WITHIN THE PAST 12 MONTHS, THE FOOD YOU BOUGHT JUST DIDN'T LAST AND YOU DIDN'T HAVE MONEY TO GET MORE.: NEVER TRUE

## 2023-09-03 SDOH — ECONOMIC STABILITY: INCOME INSECURITY: HOW HARD IS IT FOR YOU TO PAY FOR THE VERY BASICS LIKE FOOD, HOUSING, MEDICAL CARE, AND HEATING?: NOT HARD AT ALL

## 2023-09-03 SDOH — ECONOMIC STABILITY: FOOD INSECURITY: WITHIN THE PAST 12 MONTHS, YOU WORRIED THAT YOUR FOOD WOULD RUN OUT BEFORE YOU GOT MONEY TO BUY MORE.: NEVER TRUE

## 2023-09-03 SDOH — ECONOMIC STABILITY: TRANSPORTATION INSECURITY
IN THE PAST 12 MONTHS, HAS LACK OF TRANSPORTATION KEPT YOU FROM MEETINGS, WORK, OR FROM GETTING THINGS NEEDED FOR DAILY LIVING?: NO

## 2023-09-03 ASSESSMENT — PATIENT HEALTH QUESTIONNAIRE - PHQ9
SUM OF ALL RESPONSES TO PHQ9 QUESTIONS 1 & 2: 0
SUM OF ALL RESPONSES TO PHQ QUESTIONS 1-9: 0
SUM OF ALL RESPONSES TO PHQ9 QUESTIONS 1 & 2: 0
1. LITTLE INTEREST OR PLEASURE IN DOING THINGS: 0
1. LITTLE INTEREST OR PLEASURE IN DOING THINGS: NOT AT ALL
2. FEELING DOWN, DEPRESSED OR HOPELESS: NOT AT ALL
2. FEELING DOWN, DEPRESSED OR HOPELESS: 0
SUM OF ALL RESPONSES TO PHQ QUESTIONS 1-9: 0

## 2023-09-06 ENCOUNTER — OFFICE VISIT (OUTPATIENT)
Dept: FAMILY MEDICINE CLINIC | Age: 73
End: 2023-09-06
Payer: MEDICARE

## 2023-09-06 VITALS
DIASTOLIC BLOOD PRESSURE: 80 MMHG | SYSTOLIC BLOOD PRESSURE: 100 MMHG | WEIGHT: 274 LBS | HEART RATE: 110 BPM | TEMPERATURE: 97.2 F | BODY MASS INDEX: 47.03 KG/M2 | OXYGEN SATURATION: 97 %

## 2023-09-06 DIAGNOSIS — E78.2 MIXED HYPERLIPIDEMIA: ICD-10-CM

## 2023-09-06 DIAGNOSIS — I48.91 ATRIAL FIBRILLATION, UNSPECIFIED TYPE (HCC): ICD-10-CM

## 2023-09-06 DIAGNOSIS — E03.9 ACQUIRED HYPOTHYROIDISM: ICD-10-CM

## 2023-09-06 DIAGNOSIS — G47.33 OSA (OBSTRUCTIVE SLEEP APNEA): ICD-10-CM

## 2023-09-06 DIAGNOSIS — Z23 NEED FOR INFLUENZA VACCINATION: ICD-10-CM

## 2023-09-06 DIAGNOSIS — I10 ESSENTIAL HYPERTENSION: Primary | ICD-10-CM

## 2023-09-06 DIAGNOSIS — E55.9 VITAMIN D DEFICIENCY: ICD-10-CM

## 2023-09-06 DIAGNOSIS — R73.03 PREDIABETES: ICD-10-CM

## 2023-09-06 DIAGNOSIS — Z78.0 POSTMENOPAUSE: ICD-10-CM

## 2023-09-06 DIAGNOSIS — Z12.31 ENCOUNTER FOR SCREENING MAMMOGRAM FOR MALIGNANT NEOPLASM OF BREAST: ICD-10-CM

## 2023-09-06 PROCEDURE — 99215 OFFICE O/P EST HI 40 MIN: CPT | Performed by: FAMILY MEDICINE

## 2023-09-06 PROCEDURE — 3074F SYST BP LT 130 MM HG: CPT | Performed by: FAMILY MEDICINE

## 2023-09-06 PROCEDURE — G0008 ADMIN INFLUENZA VIRUS VAC: HCPCS | Performed by: FAMILY MEDICINE

## 2023-09-06 PROCEDURE — 1123F ACP DISCUSS/DSCN MKR DOCD: CPT | Performed by: FAMILY MEDICINE

## 2023-09-06 PROCEDURE — 3078F DIAST BP <80 MM HG: CPT | Performed by: FAMILY MEDICINE

## 2023-09-06 PROCEDURE — 90694 VACC AIIV4 NO PRSRV 0.5ML IM: CPT | Performed by: FAMILY MEDICINE

## 2023-09-06 RX ORDER — ROSUVASTATIN CALCIUM 5 MG/1
5 TABLET, COATED ORAL NIGHTLY
Qty: 90 TABLET | Refills: 1 | Status: SHIPPED | OUTPATIENT
Start: 2023-09-06

## 2023-09-06 RX ORDER — ERGOCALCIFEROL 1.25 MG/1
50000 CAPSULE ORAL WEEKLY
Qty: 12 CAPSULE | Refills: 1 | Status: SHIPPED | OUTPATIENT
Start: 2023-09-06

## 2023-09-06 RX ORDER — LEVOTHYROXINE SODIUM 0.12 MG/1
125 TABLET ORAL DAILY
Qty: 90 TABLET | Refills: 1 | Status: SHIPPED | OUTPATIENT
Start: 2023-09-06

## 2023-09-06 NOTE — PROGRESS NOTES
9/6/2023    Elise Chen is a 81 y.o. female here for:    Chief Complaint   Patient presents with    Hypertension     6 month med check    Other     Needs to schedule AWV for this year    Hyperlipidemia     6 month med check    Hypothyroidism     6 month med check    Atrial Fibrillation     6 month med check    Sleep Apnea     6 month med check        HPI:  HTN   - On metoprolol succinate 25 mg HS. Reports compliance with medication. Denies side effects of medication.   - Denies double vision, blurry vision, chest pain, palpitations, and syncope. HLD   - Stopped atorvastatin 40 mg HS in June 2023 due to myalgias. Myalgias have since improved. - Diet: has cut out cookies and chips; eats a variety of meat, veggies, fruits, potatoes; soda only on occasion; drinks water or Crystal Light Iced Tea or Crystal Light Lemonade    - Exercise: chair exercises, no swimming anymore  The 10-year ASCVD risk score (Shirley RASHID, et al., 2019) is: 6.5%    Values used to calculate the score:      Age: 81 years      Sex: Female      Is Non- : No      Diabetic: No      Tobacco smoker: No      Systolic Blood Pressure: 020 mmHg      Is BP treated: No      HDL Cholesterol: 45 mg/dL      Total Cholesterol: 236 mg/dL    Hypothyroidism   - On Synthroid 112 mcg QD. Reports compliance with medication. Denies side effects of medication.   - Denies constipation and palpitations. - Admits to fatigue, dry skin, and weight gain. Atrial fibrillation   - On Eliquis 5 mg BID. Reports compliance with medication. Denies side effects of medication.   - Denies nosebleeds or blood in stool. JESSICA  - On CPAP. Wears CPAP nightly. - Feels refreshed after using it. - Follows with Sleep Medicine, Dr. Singh Holcomb. Last seen in 01/2020.      Current Outpatient Medications   Medication Sig Dispense Refill    vitamin D (ERGOCALCIFEROL) 1.25 MG (31218 UT) CAPS capsule Take 1 capsule by mouth once a week 12 capsule 1

## 2023-09-28 DIAGNOSIS — I10 ESSENTIAL HYPERTENSION: ICD-10-CM

## 2023-09-28 DIAGNOSIS — I48.0 PAROXYSMAL ATRIAL FIBRILLATION (HCC): ICD-10-CM

## 2023-09-28 RX ORDER — METOPROLOL SUCCINATE 25 MG/1
TABLET, EXTENDED RELEASE ORAL
Qty: 90 TABLET | Refills: 1 | Status: SHIPPED | OUTPATIENT
Start: 2023-09-28

## 2023-09-28 NOTE — TELEPHONE ENCOUNTER
Medication Refill Request    LOV 9/6/2023  NOV 12/6/2023    Lab Results   Component Value Date    CREATININE 0.9 09/01/2023

## 2023-10-16 ENCOUNTER — PROCEDURE VISIT (OUTPATIENT)
Dept: PODIATRY | Age: 73
End: 2023-10-16
Payer: MEDICARE

## 2023-10-16 VITALS
TEMPERATURE: 97.8 F | WEIGHT: 274 LBS | DIASTOLIC BLOOD PRESSURE: 74 MMHG | BODY MASS INDEX: 47.03 KG/M2 | SYSTOLIC BLOOD PRESSURE: 126 MMHG

## 2023-10-16 DIAGNOSIS — B35.1 TINEA UNGUIUM: Primary | ICD-10-CM

## 2023-10-16 DIAGNOSIS — M79.675 PAIN IN LEFT TOE(S): ICD-10-CM

## 2023-10-16 DIAGNOSIS — B35.1 DERMATOPHYTOSIS OF NAIL: ICD-10-CM

## 2023-10-16 DIAGNOSIS — B35.3 TINEA PEDIS OF BOTH FEET: ICD-10-CM

## 2023-10-16 DIAGNOSIS — M79.674 PAIN IN TOE OF RIGHT FOOT: ICD-10-CM

## 2023-10-16 DIAGNOSIS — L60.0 OC (ONYCHOCRYPTOSIS): ICD-10-CM

## 2023-10-16 PROCEDURE — 1123F ACP DISCUSS/DSCN MKR DOCD: CPT | Performed by: PODIATRIST

## 2023-10-16 PROCEDURE — 99213 OFFICE O/P EST LOW 20 MIN: CPT | Performed by: PODIATRIST

## 2023-10-16 RX ORDER — AMMONIUM LACTATE 12 G/100G
LOTION TOPICAL
Qty: 225 G | Refills: 1 | Status: SHIPPED | OUTPATIENT
Start: 2023-10-16

## 2023-10-16 RX ORDER — OXICONAZOLE NITRATE 10 MG/ML
LOTION TOPICAL DAILY
Qty: 60 ML | Refills: 0 | Status: SHIPPED | OUTPATIENT
Start: 2023-10-16 | End: 2023-11-06

## 2023-10-16 NOTE — PROGRESS NOTES
[facility]  Return Patient    Chief Complaint   Patient presents with    Toe Pain     Oswald Hernandez DO  9/6/2023       Subjective: This Terryann Setters Hum comes to office for foot and nail care. Pt currently has complaint of thickened, painful, elongated nails that he/she cannot manage by themselves. Pt. Relates pain to nails with shoe gear. Pt's primary care physician is Adelso Rueda DO. Past Medical History:   Diagnosis Date    A-fib ([address])     Diverticulosis 06/09/2021    on colonoscopy    Hyperlipidemia     Hypothyroidism     JESSICA (obstructive sleep apnea)     On CPAP, wears nightly; follows with Sleep Medicine, Dr. Marlo Primrose    Prediabetes 10/11/2022    Tubular adenoma of colon 06/09/2021    on colonoscopy    Vitamin D deficiency        No Known Allergies  Current Outpatient Medications on File Prior to Visit   Medication Sig Dispense Refill    metoprolol succinate (TOPROL XL) 25 MG extended release tablet TAKE 1 TABLET AT BEDTIME 90 tablet 1    vitamin D (ERGOCALCIFEROL) 1.25 MG (31839 UT) CAPS capsule Take 1 capsule by mouth once a week 12 capsule 1    levothyroxine (SYNTHROID) 125 MCG tablet Take 1 tablet by mouth daily 90 tablet 1    rosuvastatin (CRESTOR) 5 MG tablet Take 1 tablet by mouth nightly 90 tablet 1    ELIQUIS 5 MG TABS tablet TAKE 1 TABLET TWICE A  tablet 1    polyethyl glycol-propyl glycol 0.4-0.3 % (SYSTANE) 0.4-0.3 % ophthalmic solution 2 drops as needed for Dry Eyes      Multiple Vitamins-Minerals (CENTRUM SILVER ULTRA WOMENS PO) Take by mouth       No current facility-administered medications on file prior to visit. Review of Systems  Objective:  General: AAO x 3 in NAD.     Derm  Toenail Description  Sites of Onychomycosis Involvement (Check affected area)  [x] [x] [x] [x] [x] [x] [x] [x] [x] [x]  5 4 3 2 1 1 2 3 4 5                          Right                                        Left    Thickness  [x] [x] [x] [x] [x] [x] [x] [x] [x] [x]  5 4 3 2 1 1 2 3 4 5

## 2024-01-02 DIAGNOSIS — R73.03 PREDIABETES: ICD-10-CM

## 2024-01-02 DIAGNOSIS — I10 ESSENTIAL HYPERTENSION: ICD-10-CM

## 2024-01-02 DIAGNOSIS — E55.9 VITAMIN D DEFICIENCY: ICD-10-CM

## 2024-01-02 DIAGNOSIS — E78.2 MIXED HYPERLIPIDEMIA: ICD-10-CM

## 2024-01-02 DIAGNOSIS — E03.9 ACQUIRED HYPOTHYROIDISM: ICD-10-CM

## 2024-01-02 LAB
ABSOLUTE IMMATURE GRANULOCYTE: 0.03 K/UL (ref 0–0.58)
ALBUMIN SERPL-MCNC: 3.9 G/DL (ref 3.5–5.2)
ALP BLD-CCNC: 83 U/L (ref 35–104)
ALT SERPL-CCNC: 12 U/L (ref 0–32)
ANION GAP SERPL CALCULATED.3IONS-SCNC: 13 MMOL/L (ref 7–16)
AST SERPL-CCNC: 16 U/L (ref 0–31)
BASOPHILS ABSOLUTE: 0.03 K/UL (ref 0–0.2)
BASOPHILS RELATIVE PERCENT: 0 % (ref 0–2)
BILIRUB SERPL-MCNC: 0.4 MG/DL (ref 0–1.2)
BUN BLDV-MCNC: 16 MG/DL (ref 6–23)
CALCIUM SERPL-MCNC: 9 MG/DL (ref 8.6–10.2)
CHLORIDE BLD-SCNC: 106 MMOL/L (ref 98–107)
CHOLESTEROL: 142 MG/DL
CO2: 22 MMOL/L (ref 22–29)
CREAT SERPL-MCNC: 0.8 MG/DL (ref 0.5–1)
EOSINOPHILS ABSOLUTE: 0.38 K/UL (ref 0.05–0.5)
EOSINOPHILS RELATIVE PERCENT: 5 % (ref 0–6)
GFR SERPL CREATININE-BSD FRML MDRD: >60 ML/MIN/1.73M2
HBA1C MFR BLD: 5.9 % (ref 4–5.6)
HCT VFR BLD CALC: 44.8 % (ref 34–48)
HDLC SERPL-MCNC: 42 MG/DL
HEMOGLOBIN: 14.3 G/DL (ref 11.5–15.5)
IMMATURE GRANULOCYTES: 0 % (ref 0–5)
LDL CHOLESTEROL: 60 MG/DL
LYMPHOCYTES ABSOLUTE: 1.66 K/UL (ref 1.5–4)
LYMPHOCYTES RELATIVE PERCENT: 20 % (ref 20–42)
MCH RBC QN AUTO: 28.4 PG (ref 26–35)
MCHC RBC AUTO-ENTMCNC: 31.9 G/DL (ref 32–34.5)
MCV RBC AUTO: 89.1 FL (ref 80–99.9)
MONOCYTES ABSOLUTE: 0.64 K/UL (ref 0.1–0.95)
MONOCYTES RELATIVE PERCENT: 8 % (ref 2–12)
NEUTROPHILS ABSOLUTE: 5.58 K/UL (ref 1.8–7.3)
NEUTROPHILS RELATIVE PERCENT: 67 % (ref 43–80)
PDW BLD-RTO: 13.4 % (ref 11.5–15)
PLATELET # BLD: 231 K/UL (ref 130–450)
PMV BLD AUTO: 12.3 FL (ref 7–12)
POTASSIUM SERPL-SCNC: 4.7 MMOL/L (ref 3.5–5)
RBC # BLD: 5.03 M/UL (ref 3.5–5.5)
SODIUM BLD-SCNC: 141 MMOL/L (ref 132–146)
TOTAL PROTEIN: 7.4 G/DL (ref 6.4–8.3)
TRIGL SERPL-MCNC: 202 MG/DL
TSH SERPL DL<=0.05 MIU/L-ACNC: 0.39 UIU/ML (ref 0.27–4.2)
VITAMIN D 25-HYDROXY: 31 NG/ML (ref 30–100)
VLDLC SERPL CALC-MCNC: 40 MG/DL
WBC # BLD: 8.3 K/UL (ref 4.5–11.5)

## 2024-01-04 ENCOUNTER — OFFICE VISIT (OUTPATIENT)
Dept: FAMILY MEDICINE CLINIC | Age: 74
End: 2024-01-04
Payer: MEDICARE

## 2024-01-04 VITALS
TEMPERATURE: 97.9 F | BODY MASS INDEX: 46.69 KG/M2 | WEIGHT: 272 LBS | SYSTOLIC BLOOD PRESSURE: 132 MMHG | HEART RATE: 98 BPM | DIASTOLIC BLOOD PRESSURE: 82 MMHG | OXYGEN SATURATION: 99 %

## 2024-01-04 DIAGNOSIS — R06.02 SHORTNESS OF BREATH: ICD-10-CM

## 2024-01-04 DIAGNOSIS — G47.33 OSA (OBSTRUCTIVE SLEEP APNEA): ICD-10-CM

## 2024-01-04 DIAGNOSIS — E55.9 VITAMIN D DEFICIENCY: ICD-10-CM

## 2024-01-04 DIAGNOSIS — E03.9 ACQUIRED HYPOTHYROIDISM: Primary | ICD-10-CM

## 2024-01-04 DIAGNOSIS — E78.2 MIXED HYPERLIPIDEMIA: ICD-10-CM

## 2024-01-04 DIAGNOSIS — I10 ESSENTIAL HYPERTENSION: ICD-10-CM

## 2024-01-04 DIAGNOSIS — I48.91 ATRIAL FIBRILLATION, UNSPECIFIED TYPE (HCC): ICD-10-CM

## 2024-01-04 DIAGNOSIS — R73.03 PREDIABETES: ICD-10-CM

## 2024-01-04 PROCEDURE — 99214 OFFICE O/P EST MOD 30 MIN: CPT | Performed by: FAMILY MEDICINE

## 2024-01-04 PROCEDURE — 1123F ACP DISCUSS/DSCN MKR DOCD: CPT | Performed by: FAMILY MEDICINE

## 2024-01-04 PROCEDURE — 93000 ELECTROCARDIOGRAM COMPLETE: CPT | Performed by: FAMILY MEDICINE

## 2024-01-04 PROCEDURE — 3075F SYST BP GE 130 - 139MM HG: CPT | Performed by: FAMILY MEDICINE

## 2024-01-04 PROCEDURE — 3079F DIAST BP 80-89 MM HG: CPT | Performed by: FAMILY MEDICINE

## 2024-01-04 ASSESSMENT — PATIENT HEALTH QUESTIONNAIRE - PHQ9
SUM OF ALL RESPONSES TO PHQ QUESTIONS 1-9: 0
SUM OF ALL RESPONSES TO PHQ9 QUESTIONS 1 & 2: 0
1. LITTLE INTEREST OR PLEASURE IN DOING THINGS: 0
2. FEELING DOWN, DEPRESSED OR HOPELESS: 0

## 2024-01-04 ASSESSMENT — LIFESTYLE VARIABLES
HOW OFTEN DO YOU HAVE A DRINK CONTAINING ALCOHOL: MONTHLY OR LESS
HOW MANY STANDARD DRINKS CONTAINING ALCOHOL DO YOU HAVE ON A TYPICAL DAY: 1 OR 2

## 2024-01-04 NOTE — PROGRESS NOTES
1/4/2024    Maricruz Booth is a 73 y.o. female here for:    Chief Complaint   Patient presents with    Hypothyroidism    Hyperlipidemia    Atrial Fibrillation    Sleep Apnea    Hypertension        HPI:  HTN   - On metoprolol succinate 25 mg HS. Reports compliance with medication. Denies side effects of medication.   - Denies double vision, blurry vision, chest pain, and syncope.      HLD   - On rosuvastatin 5 mg HS. Reports compliance with medication. Denies side effects of medication.   - Diet: \"not good\"  - Exercise: chair exercises  The 10-year ASCVD risk score (Shirley RASHID, et al., 2019) is: 10.7%    Values used to calculate the score:      Age: 73 years      Sex: Female      Is Non- : No      Diabetic: No      Tobacco smoker: No      Systolic Blood Pressure: 132 mmHg      Is BP treated: No      HDL Cholesterol: 42 mg/dL      Total Cholesterol: 142 mg/dL    Hypothyroidism   - On Synthroid 125 mcg QD. Reports compliance with medication. Denies side effects of medication.   - Admits to palpitations. Denies fatigue and constipation.       Atrial fibrillation   - On Eliquis 5 mg BID. Reports compliance with medication. Denies side effects of medication.   - Denies nosebleeds or blood in stool.   - Reports shortness of breath and reduced stamina with walking. Denies chest pain.      JESSICA  - On CPAP. Wears CPAP nightly.  - Feels refreshed after using it.   - Follows with Sleep Medicine, Dr. Muñiz. Last seen in 01/2020.     Current Outpatient Medications   Medication Sig Dispense Refill    ammonium lactate (LAC-HYDRIN) 12 % lotion Apply to feet qd 225 g 1    metoprolol succinate (TOPROL XL) 25 MG extended release tablet TAKE 1 TABLET AT BEDTIME 90 tablet 1    vitamin D (ERGOCALCIFEROL) 1.25 MG (47613 UT) CAPS capsule Take 1 capsule by mouth once a week 12 capsule 1    levothyroxine (SYNTHROID) 125 MCG tablet Take 1 tablet by mouth daily 90 tablet 1    rosuvastatin (CRESTOR) 5 MG tablet Take 1

## 2024-01-05 ENCOUNTER — TELEPHONE (OUTPATIENT)
Dept: ADMINISTRATIVE | Age: 74
End: 2024-01-05

## 2024-01-05 NOTE — TELEPHONE ENCOUNTER
Pt has referrel DX - Atrial fibrillation, unspecified type (HCC)  - Shortness of breath  LOV 8/30/22 Anthony  please advise pt

## 2024-01-15 ENCOUNTER — PROCEDURE VISIT (OUTPATIENT)
Dept: PODIATRY | Age: 74
End: 2024-01-15
Payer: MEDICARE

## 2024-01-15 VITALS
TEMPERATURE: 97.6 F | DIASTOLIC BLOOD PRESSURE: 82 MMHG | SYSTOLIC BLOOD PRESSURE: 134 MMHG | BODY MASS INDEX: 44.63 KG/M2 | WEIGHT: 260 LBS

## 2024-01-15 DIAGNOSIS — L60.0 INGROWN NAIL: ICD-10-CM

## 2024-01-15 DIAGNOSIS — B35.1 DERMATOPHYTOSIS OF NAIL: ICD-10-CM

## 2024-01-15 DIAGNOSIS — B35.3 TINEA PEDIS OF BOTH FEET: ICD-10-CM

## 2024-01-15 DIAGNOSIS — M79.675 PAIN IN LEFT TOE(S): ICD-10-CM

## 2024-01-15 DIAGNOSIS — M79.674 PAIN IN TOE OF RIGHT FOOT: ICD-10-CM

## 2024-01-15 DIAGNOSIS — L60.0 OC (ONYCHOCRYPTOSIS): ICD-10-CM

## 2024-01-15 DIAGNOSIS — B35.1 TINEA UNGUIUM: Primary | ICD-10-CM

## 2024-01-15 PROCEDURE — 1123F ACP DISCUSS/DSCN MKR DOCD: CPT | Performed by: PODIATRIST

## 2024-01-15 PROCEDURE — 99212 OFFICE O/P EST SF 10 MIN: CPT | Performed by: PODIATRIST

## 2024-01-15 NOTE — PROGRESS NOTES
[facility]  Return Patient    Chief Complaint   Patient presents with    Toe Pain     Radha Tomlin DO  1/4/2024       Subjective: This Maricruz Booth comes to office for foot and nail care.  Pt currently has complaint of thickened, painful, elongated nails that he/she cannot manage by themselves.  Pt. Relates pain to nails with shoe gear.  Pt's primary care physician is Radha Tomlin DO.  Past Medical History:   Diagnosis Date    A-fib (HCC)     Diverticulosis 06/09/2021    on colonoscopy    Hyperlipidemia     Hypothyroidism     JESSICA (obstructive sleep apnea)     On CPAP, wears nightly; follows with Sleep Medicine, Dr. Muñiz    Prediabetes 10/11/2022    Tubular adenoma of colon 06/09/2021    on colonoscopy    Vitamin D deficiency        No Known Allergies  Current Outpatient Medications on File Prior to Visit   Medication Sig Dispense Refill    ammonium lactate (LAC-HYDRIN) 12 % lotion Apply to feet qd 225 g 1    metoprolol succinate (TOPROL XL) 25 MG extended release tablet TAKE 1 TABLET AT BEDTIME 90 tablet 1    vitamin D (ERGOCALCIFEROL) 1.25 MG (40897 UT) CAPS capsule Take 1 capsule by mouth once a week 12 capsule 1    levothyroxine (SYNTHROID) 125 MCG tablet Take 1 tablet by mouth daily 90 tablet 1    rosuvastatin (CRESTOR) 5 MG tablet Take 1 tablet by mouth nightly 90 tablet 1    ELIQUIS 5 MG TABS tablet TAKE 1 TABLET TWICE A  tablet 1     No current facility-administered medications on file prior to visit.     Review of Systems  Objective:  General: AAO x 3 in NAD.    Derm  Toenail Description  Sites of Onychomycosis Involvement (Check affected area)  [x] [x] [x] [x] [x] [x] [x] [x] [x] [x]  5 4 3 2 1 1 2 3 4 5                          Right                                        Left    Thickness  [x] [x] [x] [x] [x] [x] [x] [x] [x] [x]  5 4 3 2 1 1 2 3 4 5                         Right                                        Left    Dystrophic Changes

## 2024-02-05 DIAGNOSIS — E78.2 MIXED HYPERLIPIDEMIA: ICD-10-CM

## 2024-02-05 RX ORDER — ROSUVASTATIN CALCIUM 5 MG/1
5 TABLET, COATED ORAL NIGHTLY
Qty: 90 TABLET | Refills: 1 | Status: SHIPPED | OUTPATIENT
Start: 2024-02-05

## 2024-02-05 NOTE — TELEPHONE ENCOUNTER
Medication Refill Request    LOV 1/4/2024  NOV 7/8/2024    Lab Results   Component Value Date    CREATININE 0.8 01/02/2024

## 2024-03-18 DIAGNOSIS — E03.9 ACQUIRED HYPOTHYROIDISM: ICD-10-CM

## 2024-03-18 RX ORDER — LEVOTHYROXINE SODIUM 0.12 MG/1
125 TABLET ORAL DAILY
Qty: 90 TABLET | Refills: 1 | Status: SHIPPED | OUTPATIENT
Start: 2024-03-18

## 2024-04-15 ENCOUNTER — PROCEDURE VISIT (OUTPATIENT)
Dept: PODIATRY | Age: 74
End: 2024-04-15
Payer: MEDICARE

## 2024-04-15 VITALS
TEMPERATURE: 97.8 F | SYSTOLIC BLOOD PRESSURE: 130 MMHG | DIASTOLIC BLOOD PRESSURE: 78 MMHG | BODY MASS INDEX: 44.63 KG/M2 | WEIGHT: 260 LBS

## 2024-04-15 DIAGNOSIS — M79.675 PAIN IN LEFT TOE(S): ICD-10-CM

## 2024-04-15 DIAGNOSIS — M79.674 PAIN IN TOE OF RIGHT FOOT: ICD-10-CM

## 2024-04-15 DIAGNOSIS — B35.3 TINEA PEDIS OF BOTH FEET: ICD-10-CM

## 2024-04-15 DIAGNOSIS — B35.1 DERMATOPHYTOSIS OF NAIL: ICD-10-CM

## 2024-04-15 DIAGNOSIS — B35.1 TINEA UNGUIUM: Primary | ICD-10-CM

## 2024-04-15 DIAGNOSIS — L60.0 INGROWN NAIL: ICD-10-CM

## 2024-04-15 DIAGNOSIS — L60.0 OC (ONYCHOCRYPTOSIS): ICD-10-CM

## 2024-04-15 PROCEDURE — 99213 OFFICE O/P EST LOW 20 MIN: CPT | Performed by: PODIATRIST

## 2024-04-15 PROCEDURE — 1123F ACP DISCUSS/DSCN MKR DOCD: CPT | Performed by: PODIATRIST

## 2024-04-15 NOTE — PROGRESS NOTES
[facility]  Return Patient    Chief Complaint   Patient presents with    Toe Pain     Radha Tomlin DO  2/5/24       Subjective: This Maricruz Booth comes to office for foot and nail care.  Pt currently has complaint of thickened, painful, elongated nails that he/she cannot manage by themselves.  Pt. Relates pain to nails with shoe gear.  Pt's primary care physician is Radha Tomlin DO.  Past Medical History:   Diagnosis Date    A-fib (HCC)     Diverticulosis 06/09/2021    on colonoscopy    Hyperlipidemia     Hypothyroidism     JESSICA (obstructive sleep apnea)     On CPAP, wears nightly; follows with Sleep Medicine, Dr. Muñiz    Prediabetes 10/11/2022    Tubular adenoma of colon 06/09/2021    on colonoscopy    Vitamin D deficiency        No Known Allergies  Current Outpatient Medications on File Prior to Visit   Medication Sig Dispense Refill    levothyroxine (SYNTHROID) 125 MCG tablet Take 1 tablet by mouth daily 90 tablet 1    rosuvastatin (CRESTOR) 5 MG tablet Take 1 tablet by mouth nightly 90 tablet 1    ammonium lactate (LAC-HYDRIN) 12 % lotion Apply to feet qd 225 g 1    metoprolol succinate (TOPROL XL) 25 MG extended release tablet TAKE 1 TABLET AT BEDTIME 90 tablet 1    vitamin D (ERGOCALCIFEROL) 1.25 MG (84355 UT) CAPS capsule Take 1 capsule by mouth once a week 12 capsule 1    ELIQUIS 5 MG TABS tablet TAKE 1 TABLET TWICE A  tablet 1     No current facility-administered medications on file prior to visit.     Review of Systems  Objective:  General: AAO x 3 in NAD.    Derm  Toenail Description  Sites of Onychomycosis Involvement (Check affected area)  [x] [x] [x] [x] [x] [x] [x] [x] [x] [x]  5 4 3 2 1 1 2 3 4 5                          Right                                        Left    Thickness  [x] [x] [x] [x] [x] [x] [x] [x] [x] [x]  5 4 3 2 1 1 2 3 4 5                         Right                                        Left    Dystrophic Changes

## 2024-04-18 DIAGNOSIS — I48.0 PAROXYSMAL ATRIAL FIBRILLATION (HCC): ICD-10-CM

## 2024-04-18 DIAGNOSIS — I10 ESSENTIAL HYPERTENSION: ICD-10-CM

## 2024-04-18 RX ORDER — METOPROLOL SUCCINATE 25 MG/1
25 TABLET, EXTENDED RELEASE ORAL NIGHTLY
Qty: 90 TABLET | Refills: 1 | Status: SHIPPED | OUTPATIENT
Start: 2024-04-18

## 2024-07-05 DIAGNOSIS — E55.9 VITAMIN D DEFICIENCY: ICD-10-CM

## 2024-07-05 DIAGNOSIS — I48.91 ATRIAL FIBRILLATION, UNSPECIFIED TYPE (HCC): ICD-10-CM

## 2024-07-05 DIAGNOSIS — R73.03 PREDIABETES: ICD-10-CM

## 2024-07-05 DIAGNOSIS — E78.2 MIXED HYPERLIPIDEMIA: ICD-10-CM

## 2024-07-05 DIAGNOSIS — E03.9 ACQUIRED HYPOTHYROIDISM: ICD-10-CM

## 2024-07-05 LAB
ALBUMIN: 3.9 G/DL (ref 3.5–5.2)
ALP BLD-CCNC: 77 U/L (ref 35–104)
ALT SERPL-CCNC: 13 U/L (ref 0–32)
ANION GAP SERPL CALCULATED.3IONS-SCNC: 14 MMOL/L (ref 7–16)
AST SERPL-CCNC: 21 U/L (ref 0–31)
BASOPHILS ABSOLUTE: 0.03 K/UL (ref 0–0.2)
BASOPHILS RELATIVE PERCENT: 1 % (ref 0–2)
BILIRUB SERPL-MCNC: 0.4 MG/DL (ref 0–1.2)
BUN BLDV-MCNC: 20 MG/DL (ref 6–23)
CALCIUM SERPL-MCNC: 8.9 MG/DL (ref 8.6–10.2)
CHLORIDE BLD-SCNC: 105 MMOL/L (ref 98–107)
CHOLESTEROL, TOTAL: 141 MG/DL
CO2: 21 MMOL/L (ref 22–29)
CREAT SERPL-MCNC: 0.9 MG/DL (ref 0.5–1)
EOSINOPHILS ABSOLUTE: 0.31 K/UL (ref 0.05–0.5)
EOSINOPHILS RELATIVE PERCENT: 5 % (ref 0–6)
GFR, ESTIMATED: 69 ML/MIN/1.73M2
GLUCOSE BLD-MCNC: 95 MG/DL (ref 74–99)
HBA1C MFR BLD: 5.9 % (ref 4–5.6)
HCT VFR BLD CALC: 45.9 % (ref 34–48)
HDLC SERPL-MCNC: 46 MG/DL
HEMOGLOBIN: 14 G/DL (ref 11.5–15.5)
IMMATURE GRANULOCYTES %: 1 % (ref 0–5)
IMMATURE GRANULOCYTES ABSOLUTE: 0.03 K/UL (ref 0–0.58)
LDL CHOLESTEROL: 63 MG/DL
LYMPHOCYTES ABSOLUTE: 1.29 K/UL (ref 1.5–4)
LYMPHOCYTES RELATIVE PERCENT: 20 % (ref 20–42)
MCH RBC QN AUTO: 27.2 PG (ref 26–35)
MCHC RBC AUTO-ENTMCNC: 30.5 G/DL (ref 32–34.5)
MCV RBC AUTO: 89.1 FL (ref 80–99.9)
MONOCYTES ABSOLUTE: 0.62 K/UL (ref 0.1–0.95)
MONOCYTES RELATIVE PERCENT: 10 % (ref 2–12)
NEUTROPHILS ABSOLUTE: 4.1 K/UL (ref 1.8–7.3)
NEUTROPHILS RELATIVE PERCENT: 64 % (ref 43–80)
PDW BLD-RTO: 14.3 % (ref 11.5–15)
PLATELET # BLD: 221 K/UL (ref 130–450)
PMV BLD AUTO: 12.2 FL (ref 7–12)
POTASSIUM SERPL-SCNC: 4.7 MMOL/L (ref 3.5–5)
RBC # BLD: 5.15 M/UL (ref 3.5–5.5)
SODIUM BLD-SCNC: 140 MMOL/L (ref 132–146)
TOTAL PROTEIN: 7.1 G/DL (ref 6.4–8.3)
TRIGL SERPL-MCNC: 161 MG/DL
TSH SERPL DL<=0.05 MIU/L-ACNC: 0.74 UIU/ML (ref 0.27–4.2)
VITAMIN D 25-HYDROXY: 27.7 NG/ML (ref 30–100)
VLDLC SERPL CALC-MCNC: 32 MG/DL
WBC # BLD: 6.4 K/UL (ref 4.5–11.5)

## 2024-07-05 SDOH — HEALTH STABILITY: PHYSICAL HEALTH: ON AVERAGE, HOW MANY DAYS PER WEEK DO YOU ENGAGE IN MODERATE TO STRENUOUS EXERCISE (LIKE A BRISK WALK)?: 0 DAYS

## 2024-07-05 ASSESSMENT — PATIENT HEALTH QUESTIONNAIRE - PHQ9
SUM OF ALL RESPONSES TO PHQ QUESTIONS 1-9: 1
SUM OF ALL RESPONSES TO PHQ QUESTIONS 1-9: 1
2. FEELING DOWN, DEPRESSED OR HOPELESS: SEVERAL DAYS
SUM OF ALL RESPONSES TO PHQ9 QUESTIONS 1 & 2: 1
SUM OF ALL RESPONSES TO PHQ QUESTIONS 1-9: 1
1. LITTLE INTEREST OR PLEASURE IN DOING THINGS: NOT AT ALL
SUM OF ALL RESPONSES TO PHQ QUESTIONS 1-9: 1

## 2024-07-05 ASSESSMENT — LIFESTYLE VARIABLES
HOW OFTEN DO YOU HAVE A DRINK CONTAINING ALCOHOL: MONTHLY OR LESS
HOW MANY STANDARD DRINKS CONTAINING ALCOHOL DO YOU HAVE ON A TYPICAL DAY: PATIENT DOES NOT DRINK
HOW MANY STANDARD DRINKS CONTAINING ALCOHOL DO YOU HAVE ON A TYPICAL DAY: 0
HOW OFTEN DO YOU HAVE A DRINK CONTAINING ALCOHOL: 2
HOW OFTEN DO YOU HAVE SIX OR MORE DRINKS ON ONE OCCASION: 1

## 2024-07-08 ENCOUNTER — OFFICE VISIT (OUTPATIENT)
Dept: FAMILY MEDICINE CLINIC | Age: 74
End: 2024-07-08
Payer: MEDICARE

## 2024-07-08 ENCOUNTER — TELEPHONE (OUTPATIENT)
Dept: CARDIOLOGY | Age: 74
End: 2024-07-08

## 2024-07-08 VITALS
WEIGHT: 275.8 LBS | HEART RATE: 68 BPM | OXYGEN SATURATION: 96 % | SYSTOLIC BLOOD PRESSURE: 118 MMHG | DIASTOLIC BLOOD PRESSURE: 80 MMHG | TEMPERATURE: 97.8 F | HEIGHT: 63 IN | BODY MASS INDEX: 48.87 KG/M2

## 2024-07-08 DIAGNOSIS — Z00.00 MEDICARE ANNUAL WELLNESS VISIT, SUBSEQUENT: Primary | ICD-10-CM

## 2024-07-08 DIAGNOSIS — E03.9 ACQUIRED HYPOTHYROIDISM: ICD-10-CM

## 2024-07-08 DIAGNOSIS — I10 ESSENTIAL HYPERTENSION: ICD-10-CM

## 2024-07-08 DIAGNOSIS — I48.11 LONGSTANDING PERSISTENT ATRIAL FIBRILLATION (HCC): ICD-10-CM

## 2024-07-08 DIAGNOSIS — G47.33 OSA (OBSTRUCTIVE SLEEP APNEA): ICD-10-CM

## 2024-07-08 DIAGNOSIS — E78.2 MIXED HYPERLIPIDEMIA: ICD-10-CM

## 2024-07-08 DIAGNOSIS — R06.02 SHORTNESS OF BREATH: ICD-10-CM

## 2024-07-08 DIAGNOSIS — R73.03 PREDIABETES: ICD-10-CM

## 2024-07-08 DIAGNOSIS — E55.9 VITAMIN D DEFICIENCY: ICD-10-CM

## 2024-07-08 PROBLEM — E66.01 MORBID OBESITY WITH BMI OF 45.0-49.9, ADULT (HCC): Status: ACTIVE | Noted: 2024-07-08

## 2024-07-08 PROCEDURE — 3074F SYST BP LT 130 MM HG: CPT | Performed by: FAMILY MEDICINE

## 2024-07-08 PROCEDURE — G0439 PPPS, SUBSEQ VISIT: HCPCS | Performed by: FAMILY MEDICINE

## 2024-07-08 PROCEDURE — 3079F DIAST BP 80-89 MM HG: CPT | Performed by: FAMILY MEDICINE

## 2024-07-08 PROCEDURE — 99214 OFFICE O/P EST MOD 30 MIN: CPT | Performed by: FAMILY MEDICINE

## 2024-07-08 PROCEDURE — 93000 ELECTROCARDIOGRAM COMPLETE: CPT | Performed by: FAMILY MEDICINE

## 2024-07-08 PROCEDURE — 1123F ACP DISCUSS/DSCN MKR DOCD: CPT | Performed by: FAMILY MEDICINE

## 2024-07-08 RX ORDER — CHOLECALCIFEROL (VITAMIN D3) 1250 MCG
1 CAPSULE ORAL WEEKLY
Qty: 12 CAPSULE | Refills: 1 | Status: SHIPPED | OUTPATIENT
Start: 2024-07-08

## 2024-07-08 RX ORDER — ERGOCALCIFEROL 1.25 MG/1
50000 CAPSULE ORAL WEEKLY
Qty: 12 CAPSULE | Refills: 1 | Status: SHIPPED
Start: 2024-07-08 | End: 2024-07-08

## 2024-07-08 NOTE — PROGRESS NOTES
loss.      Atrial fibrillation   - On Eliquis 5 mg BID. Reports compliance with medication. Denies side effects of medication.   - Denies nosebleeds or blood in stool.   - Denies chest pain. Admits to fatigue and shortness of breath with exertional activity, such as going up 1 flight of stairs.     JESSICA  - On CPAP. Wears CPAP nightly.  - Follows with Sleep Medicine, Dr. Muñiz. Last seen in 01/2020.     Patient's complete Health Risk Assessment and screening values have been reviewed and are found in Flowsheets. The following problems were reviewed today and where indicated follow up appointments were made and/or referrals ordered.    Positive Risk Factor Screenings with Interventions:               General HRA Questions:  Select all that apply: (!) Stress    Stress Interventions:  Patient comments: Patient has been caring for 90 YO uncle. Uncle was recently placed into Assisted Living. She and  are cleaning out uncle's house and are in the process of selling it.       Activity, Diet, and Weight:  On average, how many days per week do you engage in moderate to strenuous exercise (like a brisk walk)?: 0 days       Do you eat balanced/healthy meals regularly?: Yes    Body mass index is 48.86 kg/m². (!) Abnormal    Obesity Interventions:  See AVS for additional education material       Hearing Screen:  Do you or your family notice any trouble with your hearing that hasn't been managed with hearing aids?: (!) Yes    Interventions:  Patient declines any further evaluation or treatment                 Objective   Vitals:    07/08/24 0817 07/08/24 0909   BP: 122/84 118/80   Site: Right Lower Arm Right Upper Arm   Position: Sitting Sitting   Cuff Size: Large Adult Large Adult   Pulse: 68    Temp: 97.8 °F (36.6 °C)    SpO2: 96%    Weight: 125.1 kg (275 lb 12.8 oz)    Height: 1.6 m (5' 3\")       Body mass index is 48.86 kg/m².      Physical Exam  Vitals reviewed.   Constitutional:       General: She is awake. She is not

## 2024-07-08 NOTE — PATIENT INSTRUCTIONS
radio, or microphone.  Devices that use lights or vibrations. These alert you to the doorbell, a ringing telephone, or a baby monitor.  Television closed-captioning. This shows the words at the bottom of the screen. Most new TVs can do this.  TTY (text telephone). This lets you type messages back and forth on the telephone instead of talking or listening. These devices are also called TDD. When messages are typed on the keyboard, they are sent over the phone line to a receiving TTY. The message is shown on a monitor.  Use text messaging, social media, and email if it is hard for you to communicate by telephone.  Try to learn a listening technique called speechreading. It is not lipreading. You pay attention to people's gestures, expressions, posture, and tone of voice. These clues can help you understand what a person is saying. Face the person you are talking to, and have them face you. Make sure the lighting is good. You need to see the other person's face clearly.  Think about counseling if you need help to adjust to your hearing loss.  When should you call for help?  Watch closely for changes in your health, and be sure to contact your doctor if:    You think your hearing is getting worse.     You have new symptoms, such as dizziness or nausea.   Where can you learn more?  Go to https://www.[employer].net/patientEd and enter R798 to learn more about \"Hearing Loss: Care Instructions.\"  Current as of: September 27, 2023  Content Version: 14.1  © 2006-2024 [employer].   Care instructions adapted under license by [employer]. If you have questions about a medical condition or this instruction, always ask your healthcare professional. [employer] disclaims any warranty or liability for your use of this information.           Starting a Weight Loss Plan: Care Instructions  Overview     It can be a challenge to lose weight. But your doctor can help you make a weight-loss plan that meets your

## 2024-07-08 NOTE — TELEPHONE ENCOUNTER
Left message to schedule echo and Exercise Nuclear stress test.  Electronically signed by Sherin Munoz on 7/8/2024 at 1:35 PM

## 2024-07-12 ENCOUNTER — TELEPHONE (OUTPATIENT)
Dept: FAMILY MEDICINE CLINIC | Age: 74
End: 2024-07-12

## 2024-07-12 NOTE — TELEPHONE ENCOUNTER
Pharmacy called for clarification on medication Vitamin D.   Wrote 2 scripts for Vitamin D.  One was Vitamin D2 and the other was Vitamin D3, both for 50,000 units once weekly.      Insurance will not cover Vitamin D3, Dr. Winn verbally okayed medication to be the Vitamin D2 50,000 units once weekly.

## 2024-07-30 DIAGNOSIS — E78.2 MIXED HYPERLIPIDEMIA: ICD-10-CM

## 2024-07-30 RX ORDER — ROSUVASTATIN CALCIUM 5 MG/1
5 TABLET, COATED ORAL NIGHTLY
Qty: 90 TABLET | Refills: 1 | Status: SHIPPED | OUTPATIENT
Start: 2024-07-30

## 2024-07-30 RX ORDER — ERGOCALCIFEROL 1.25 MG/1
CAPSULE ORAL
COMMUNITY
Start: 2024-07-08

## 2024-07-30 NOTE — TELEPHONE ENCOUNTER
Medication Refill Request    LOV 7/8/2024  NOV 1/8/2025    Lab Results   Component Value Date    CREATININE 0.9 07/05/2024

## 2024-08-12 ENCOUNTER — PROCEDURE VISIT (OUTPATIENT)
Dept: PODIATRY | Age: 74
End: 2024-08-12
Payer: MEDICARE

## 2024-08-12 VITALS
SYSTOLIC BLOOD PRESSURE: 142 MMHG | WEIGHT: 275 LBS | BODY MASS INDEX: 48.71 KG/M2 | TEMPERATURE: 97.2 F | DIASTOLIC BLOOD PRESSURE: 84 MMHG

## 2024-08-12 DIAGNOSIS — M79.674 PAIN IN TOE OF RIGHT FOOT: ICD-10-CM

## 2024-08-12 DIAGNOSIS — L60.0 INGROWN NAIL: ICD-10-CM

## 2024-08-12 DIAGNOSIS — M79.675 PAIN IN LEFT TOE(S): ICD-10-CM

## 2024-08-12 DIAGNOSIS — B35.1 TINEA UNGUIUM: Primary | ICD-10-CM

## 2024-08-12 DIAGNOSIS — B35.1 DERMATOPHYTOSIS OF NAIL: ICD-10-CM

## 2024-08-12 DIAGNOSIS — L60.0 OC (ONYCHOCRYPTOSIS): ICD-10-CM

## 2024-08-12 PROCEDURE — 3079F DIAST BP 80-89 MM HG: CPT | Performed by: PODIATRIST

## 2024-08-12 PROCEDURE — 99212 OFFICE O/P EST SF 10 MIN: CPT | Performed by: PODIATRIST

## 2024-08-12 PROCEDURE — 3077F SYST BP >= 140 MM HG: CPT | Performed by: PODIATRIST

## 2024-08-12 PROCEDURE — 1123F ACP DISCUSS/DSCN MKR DOCD: CPT | Performed by: PODIATRIST

## 2024-08-12 NOTE — PROGRESS NOTES
[facility]  [facility]    Chief Complaint   Patient presents with    Toe Pain     Radha Tomlin DO  Last visit 7/30/24    Nail Problem     Jublia and rowanc lilian        Subjective: This Maricruz Booth comes to office for foot and nail care.  Pt currently has complaint of thickened, painful, elongated nails that he/she cannot manage by themselves.  Pt. Relates pain to nails with shoe gear.  Pt's primary care physician is Radha Tomlin DO.  Past Medical History:   Diagnosis Date    A-fib (HCC)     Diverticulosis 06/09/2021    on colonoscopy    Hyperlipidemia     Hypothyroidism     JESSICA (obstructive sleep apnea)     On CPAP, wears nightly; follows with Sleep Medicine, Dr. Muñiz    Prediabetes 10/11/2022    Tubular adenoma of colon 06/09/2021    on colonoscopy    Vitamin D deficiency        No Known Allergies  Current Outpatient Medications on File Prior to Visit   Medication Sig Dispense Refill    rosuvastatin (CRESTOR) 5 MG tablet TAKE 1 TABLET NIGHTLY 90 tablet 1    vitamin D (ERGOCALCIFEROL) 1.25 MG (92787 UT) CAPS capsule       Coenzyme Q10 (CO Q 10 PO) Take by mouth daily      Cholecalciferol (VITAMIN D3) 1.25 MG (16061 UT) CAPS Take 1 capsule by mouth once a week 12 capsule 1    apixaban (ELIQUIS) 5 MG TABS tablet Take 1 tablet by mouth 2 times daily 180 tablet 1    metoprolol succinate (TOPROL XL) 25 MG extended release tablet Take 1 tablet by mouth nightly at bedtime. 90 tablet 1    Efinaconazole 10 % SOLN Apply 8 mLs topically daily 8 mL 3    levothyroxine (SYNTHROID) 125 MCG tablet Take 1 tablet by mouth daily 90 tablet 1    ammonium lactate (LAC-HYDRIN) 12 % lotion Apply to feet qd 225 g 1     Current Facility-Administered Medications on File Prior to Visit   Medication Dose Route Frequency Provider Last Rate Last Admin    perflutren lipid microspheres (DEFINITY) injection 1.5 mL  1.5 mL IntraVENous ONCE PRN Radha Tomlin DO         Review of Systems  Objective:  General: AAO x 3

## 2024-08-15 ENCOUNTER — TELEPHONE (OUTPATIENT)
Dept: FAMILY MEDICINE CLINIC | Age: 74
End: 2024-08-15

## 2024-08-15 ENCOUNTER — TELEPHONE (OUTPATIENT)
Dept: CARDIOLOGY | Age: 74
End: 2024-08-15

## 2024-08-15 DIAGNOSIS — R06.02 SHORTNESS OF BREATH: Primary | ICD-10-CM

## 2024-08-15 NOTE — TELEPHONE ENCOUNTER
Spoke with patient and confirmed nuclear stress test appointment on 8/19/24 at 0800. Instructions for test, to hold Toprol for 24 hours before test, and COVID-19 preprocedure information reviewed with patient, questions answered. Patient verbalized understanding.

## 2024-08-15 NOTE — TELEPHONE ENCOUNTER
Cardiology called and would like if  could put in for a racheal scan Pt has concerns about walking on the treadmill and would like the racheal scan as back up if the treadmill doesn't work for Pt Please advise

## 2024-08-19 ENCOUNTER — HOSPITAL ENCOUNTER (OUTPATIENT)
Dept: CARDIOLOGY | Age: 74
Discharge: HOME | End: 2024-08-21
Payer: MEDICARE

## 2024-08-19 VITALS
DIASTOLIC BLOOD PRESSURE: 84 MMHG | HEIGHT: 63 IN | BODY MASS INDEX: 48.73 KG/M2 | SYSTOLIC BLOOD PRESSURE: 142 MMHG | WEIGHT: 275 LBS

## 2024-08-19 VITALS
HEIGHT: 63 IN | SYSTOLIC BLOOD PRESSURE: 110 MMHG | BODY MASS INDEX: 48.73 KG/M2 | HEART RATE: 76 BPM | DIASTOLIC BLOOD PRESSURE: 84 MMHG | RESPIRATION RATE: 12 BRPM | WEIGHT: 275 LBS

## 2024-08-19 DIAGNOSIS — R06.02 SHORTNESS OF BREATH: ICD-10-CM

## 2024-08-19 LAB
ECHO BSA: 2.35 M2
NUC STRESS EJECTION FRACTION: 76 %
STRESS BASELINE DIAS BP: 84 MMHG
STRESS BASELINE HR: 106 BPM
STRESS BASELINE SYS BP: 110 MMHG
STRESS ESTIMATED WORKLOAD: 1 METS
STRESS PEAK DIAS BP: 70 MMHG
STRESS PEAK SYS BP: 104 MMHG
STRESS PERCENT HR ACHIEVED: 85 %
STRESS POST PEAK HR: 127 BPM
STRESS RATE PRESSURE PRODUCT: NORMAL BPM*MMHG
STRESS TARGET HR: 149 BPM
TID: 1

## 2024-08-19 PROCEDURE — 93016 CV STRESS TEST SUPVJ ONLY: CPT | Performed by: INTERNAL MEDICINE

## 2024-08-19 PROCEDURE — 78452 HT MUSCLE IMAGE SPECT MULT: CPT | Performed by: INTERNAL MEDICINE

## 2024-08-19 PROCEDURE — 2580000003 HC RX 258: Performed by: INTERNAL MEDICINE

## 2024-08-19 PROCEDURE — 93018 CV STRESS TEST I&R ONLY: CPT | Performed by: INTERNAL MEDICINE

## 2024-08-19 PROCEDURE — A9500 TC99M SESTAMIBI: HCPCS | Performed by: INTERNAL MEDICINE

## 2024-08-19 PROCEDURE — 6360000002 HC RX W HCPCS: Performed by: FAMILY MEDICINE

## 2024-08-19 PROCEDURE — 93306 TTE W/DOPPLER COMPLETE: CPT

## 2024-08-19 PROCEDURE — 93017 CV STRESS TEST TRACING ONLY: CPT

## 2024-08-19 PROCEDURE — 3430000000 HC RX DIAGNOSTIC RADIOPHARMACEUTICAL: Performed by: INTERNAL MEDICINE

## 2024-08-19 RX ORDER — SODIUM CHLORIDE 0.9 % (FLUSH) 0.9 %
10 SYRINGE (ML) INJECTION PRN
Status: DISCONTINUED | OUTPATIENT
Start: 2024-08-19 | End: 2024-08-22 | Stop reason: HOSPADM

## 2024-08-19 RX ORDER — TETRAKIS(2-METHOXYISOBUTYLISOCYANIDE)COPPER(I) TETRAFLUOROBORATE 1 MG/ML
32.1 INJECTION, POWDER, LYOPHILIZED, FOR SOLUTION INTRAVENOUS
Status: COMPLETED | OUTPATIENT
Start: 2024-08-19 | End: 2024-08-19

## 2024-08-19 RX ORDER — TETRAKIS(2-METHOXYISOBUTYLISOCYANIDE)COPPER(I) TETRAFLUOROBORATE 1 MG/ML
10.5 INJECTION, POWDER, LYOPHILIZED, FOR SOLUTION INTRAVENOUS
Status: COMPLETED | OUTPATIENT
Start: 2024-08-19 | End: 2024-08-19

## 2024-08-19 RX ORDER — REGADENOSON 0.08 MG/ML
0.4 INJECTION, SOLUTION INTRAVENOUS
Status: COMPLETED | OUTPATIENT
Start: 2024-08-19 | End: 2024-08-19

## 2024-08-19 RX ADMIN — Medication 10.5 MILLICURIE: at 08:04

## 2024-08-19 RX ADMIN — SODIUM CHLORIDE, PRESERVATIVE FREE 10 ML: 5 INJECTION INTRAVENOUS at 10:03

## 2024-08-19 RX ADMIN — SODIUM CHLORIDE, PRESERVATIVE FREE 10 ML: 5 INJECTION INTRAVENOUS at 10:05

## 2024-08-19 RX ADMIN — SODIUM CHLORIDE, PRESERVATIVE FREE 10 ML: 5 INJECTION INTRAVENOUS at 08:05

## 2024-08-19 RX ADMIN — REGADENOSON 0.4 MG: 0.08 INJECTION, SOLUTION INTRAVENOUS at 10:04

## 2024-08-19 RX ADMIN — Medication 32.1 MILLICURIE: at 10:04

## 2024-08-22 LAB
ECHO AO ASC DIAM: 2.9 CM
ECHO AO ASCENDING AORTA INDEX: 1.31 CM/M2
ECHO AR MAX VEL PISA: 5 M/S
ECHO AV AREA PEAK VELOCITY: 2.1 CM2
ECHO AV AREA VTI: 2.3 CM2
ECHO AV AREA/BSA PEAK VELOCITY: 1 CM2/M2
ECHO AV AREA/BSA VTI: 1 CM2/M2
ECHO AV CUSP MM: 2.1 CM
ECHO AV MEAN GRADIENT: 4 MMHG
ECHO AV MEAN VELOCITY: 1 M/S
ECHO AV PEAK GRADIENT: 8 MMHG
ECHO AV PEAK VELOCITY: 1.4 M/S
ECHO AV REGURGITANT PHT: 498.1 MILLISECOND
ECHO AV VELOCITY RATIO: 0.64
ECHO AV VTI: 28.7 CM
ECHO BSA: 2.35 M2
ECHO EST RA PRESSURE: 3 MMHG
ECHO LA DIAMETER INDEX: 2.13 CM/M2
ECHO LA DIAMETER: 4.7 CM
ECHO LA VOL A-L A2C: 40 ML (ref 22–52)
ECHO LA VOL A-L A4C: 59 ML (ref 22–52)
ECHO LA VOL MOD A2C: 39 ML (ref 22–52)
ECHO LA VOL MOD A4C: 55 ML (ref 22–52)
ECHO LA VOLUME AREA LENGTH: 51 ML
ECHO LA VOLUME INDEX A-L A2C: 18 ML/M2 (ref 16–34)
ECHO LA VOLUME INDEX A-L A4C: 27 ML/M2 (ref 16–34)
ECHO LA VOLUME INDEX AREA LENGTH: 23 ML/M2 (ref 16–34)
ECHO LA VOLUME INDEX MOD A2C: 18 ML/M2 (ref 16–34)
ECHO LA VOLUME INDEX MOD A4C: 25 ML/M2 (ref 16–34)
ECHO LV EF PHYSICIAN: 63 %
ECHO LV FRACTIONAL SHORTENING: 35 % (ref 28–44)
ECHO LV INTERNAL DIMENSION DIASTOLE INDEX: 1.95 CM/M2
ECHO LV INTERNAL DIMENSION DIASTOLIC: 4.3 CM (ref 3.9–5.3)
ECHO LV INTERNAL DIMENSION SYSTOLIC INDEX: 1.27 CM/M2
ECHO LV INTERNAL DIMENSION SYSTOLIC: 2.8 CM
ECHO LV IVSD: 1.3 CM (ref 0.6–0.9)
ECHO LV IVSS: 1.6 CM
ECHO LV MASS 2D: 184.7 G (ref 67–162)
ECHO LV MASS INDEX 2D: 83.6 G/M2 (ref 43–95)
ECHO LV POSTERIOR WALL DIASTOLIC: 1.1 CM (ref 0.6–0.9)
ECHO LV POSTERIOR WALL SYSTOLIC: 1.2 CM
ECHO LV RELATIVE WALL THICKNESS RATIO: 0.51
ECHO LVOT AREA: 3.5 CM2
ECHO LVOT AV VTI INDEX: 0.66
ECHO LVOT DIAM: 2.1 CM
ECHO LVOT MEAN GRADIENT: 2 MMHG
ECHO LVOT PEAK GRADIENT: 3 MMHG
ECHO LVOT PEAK VELOCITY: 0.9 M/S
ECHO LVOT STROKE VOLUME INDEX: 29.4 ML/M2
ECHO LVOT SV: 65.1 ML
ECHO LVOT VTI: 18.8 CM
ECHO MV AREA VTI: 2.5 CM2
ECHO MV E DECELERATION TIME (DT): 200.5 MS
ECHO MV LVOT VTI INDEX: 1.36
ECHO MV MAX VELOCITY: 1.4 M/S
ECHO MV MEAN GRADIENT: 3 MMHG
ECHO MV MEAN VELOCITY: 0.9 M/S
ECHO MV PEAK GRADIENT: 8 MMHG
ECHO MV VTI: 25.6 CM
ECHO PV MAX VELOCITY: 1.2 M/S
ECHO PV MEAN GRADIENT: 4 MMHG
ECHO PV MEAN VELOCITY: 0.9 M/S
ECHO PV PEAK GRADIENT: 6 MMHG
ECHO PV VTI: 20.3 CM
ECHO RIGHT VENTRICULAR SYSTOLIC PRESSURE (RVSP): 30 MMHG
ECHO RV INTERNAL DIMENSION: 2.9 CM
ECHO TV REGURGITANT MAX VELOCITY: 2.58 M/S
ECHO TV REGURGITANT PEAK GRADIENT: 27 MMHG

## 2024-08-22 PROCEDURE — 93306 TTE W/DOPPLER COMPLETE: CPT | Performed by: INTERNAL MEDICINE

## 2024-08-30 ENCOUNTER — PATIENT MESSAGE (OUTPATIENT)
Dept: FAMILY MEDICINE CLINIC | Age: 74
End: 2024-08-30

## 2024-09-02 DIAGNOSIS — E03.9 ACQUIRED HYPOTHYROIDISM: ICD-10-CM

## 2024-09-03 RX ORDER — LEVOTHYROXINE SODIUM 125 UG/1
125 TABLET ORAL DAILY
Qty: 90 TABLET | Refills: 1 | Status: SHIPPED | OUTPATIENT
Start: 2024-09-03

## 2024-09-05 NOTE — TELEPHONE ENCOUNTER
Called [facility], they can not get patient in sooner ar this time but she is on a cancellation list. Offer patient a referral to [facility] and patient stated she is feeling okay at this time and will think about going to the [facility].

## 2024-10-03 DIAGNOSIS — I10 ESSENTIAL HYPERTENSION: ICD-10-CM

## 2024-10-03 DIAGNOSIS — I48.0 PAROXYSMAL ATRIAL FIBRILLATION (HCC): ICD-10-CM

## 2024-10-03 RX ORDER — APIXABAN 5 MG/1
5 TABLET, FILM COATED ORAL 2 TIMES DAILY
Qty: 180 TABLET | Refills: 1 | Status: SHIPPED | OUTPATIENT
Start: 2024-10-03

## 2024-10-03 RX ORDER — METOPROLOL SUCCINATE 25 MG/1
TABLET, EXTENDED RELEASE ORAL
Qty: 90 TABLET | Refills: 1 | Status: SHIPPED | OUTPATIENT
Start: 2024-10-03

## 2024-11-11 ENCOUNTER — PROCEDURE VISIT (OUTPATIENT)
Dept: PODIATRY | Age: 74
End: 2024-11-11
Payer: MEDICARE

## 2024-11-11 VITALS
TEMPERATURE: 97.3 F | OXYGEN SATURATION: 96 % | BODY MASS INDEX: 48.71 KG/M2 | SYSTOLIC BLOOD PRESSURE: 134 MMHG | WEIGHT: 275 LBS | DIASTOLIC BLOOD PRESSURE: 74 MMHG | HEART RATE: 87 BPM

## 2024-11-11 DIAGNOSIS — M79.675 PAIN IN LEFT TOE(S): ICD-10-CM

## 2024-11-11 DIAGNOSIS — L60.0 OC (ONYCHOCRYPTOSIS): ICD-10-CM

## 2024-11-11 DIAGNOSIS — B35.1 DERMATOPHYTOSIS OF NAIL: ICD-10-CM

## 2024-11-11 DIAGNOSIS — M79.674 PAIN IN TOE OF RIGHT FOOT: ICD-10-CM

## 2024-11-11 DIAGNOSIS — B35.1 TINEA UNGUIUM: Primary | ICD-10-CM

## 2024-11-11 DIAGNOSIS — L60.0 INGROWN NAIL: ICD-10-CM

## 2024-11-11 PROCEDURE — 1159F MED LIST DOCD IN RCRD: CPT | Performed by: PODIATRIST

## 2024-11-11 PROCEDURE — 3075F SYST BP GE 130 - 139MM HG: CPT | Performed by: PODIATRIST

## 2024-11-11 PROCEDURE — 3078F DIAST BP <80 MM HG: CPT | Performed by: PODIATRIST

## 2024-11-11 PROCEDURE — 99213 OFFICE O/P EST LOW 20 MIN: CPT | Performed by: PODIATRIST

## 2024-11-11 PROCEDURE — 1123F ACP DISCUSS/DSCN MKR DOCD: CPT | Performed by: PODIATRIST

## 2024-11-11 NOTE — PROGRESS NOTES
[facility]  [facility]    Chief Complaint   Patient presents with    Toe Pain     Radha Tomlin DO  10/3/24    Nail Problem     Juabdi quintana        Subjective: This Maricruz Booth comes to office for foot and nail care.  Pt currently has complaint of thickened, painful, elongated nails that he/she cannot manage by themselves.  Pt. Relates pain to nails with shoe gear.  Pt's primary care physician is Radha Tomlin DO.  Past Medical History:   Diagnosis Date    A-fib (HCC)     Diverticulosis 06/09/2021    on colonoscopy    Hyperlipidemia     Hypothyroidism     JESSICA (obstructive sleep apnea)     On CPAP, wears nightly; follows with Sleep Medicine, Dr. Muñiz    Prediabetes 10/11/2022    Tubular adenoma of colon 06/09/2021    on colonoscopy    Vitamin D deficiency        No Known Allergies  Current Outpatient Medications on File Prior to Visit   Medication Sig Dispense Refill    ELIQUIS 5 MG TABS tablet TAKE 1 TABLET TWICE A  tablet 1    metoprolol succinate (TOPROL XL) 25 MG extended release tablet TAKE 1 TABLET NIGHTLY AT   BEDTIME 90 tablet 1    levothyroxine (SYNTHROID) 125 MCG tablet Take 1 tablet by mouth daily 90 tablet 1    rosuvastatin (CRESTOR) 5 MG tablet TAKE 1 TABLET NIGHTLY 90 tablet 1    Coenzyme Q10 (CO Q 10 PO) Take by mouth daily      Cholecalciferol (VITAMIN D3) 1.25 MG (07736 UT) CAPS Take 1 capsule by mouth once a week 12 capsule 1    ammonium lactate (LAC-HYDRIN) 12 % lotion Apply to feet qd 225 g 1     Current Facility-Administered Medications on File Prior to Visit   Medication Dose Route Frequency Provider Last Rate Last Admin    perflutren lipid microspheres (DEFINITY) injection 1.5 mL  1.5 mL IntraVENous ONCE PRN Radha Tomlin DO         Review of Systems  Objective:  General: AAO x 3 in NAD.    Derm  Toenail Description  Sites of Onychomycosis Involvement (Check affected area)  [x] [x] [x] [x] [x] [x] [x] [x] [x] [x]  5 4 3 2 1 1 2 3 4 5

## 2024-11-26 NOTE — PROGRESS NOTES
[facility]  Consultation Report  PATIENT: Maricruz Booth  MEDICAL RECORD NUMBER: [id number]  DATE OF SERVICE:  11/27/2024  ATTENDING ELECTROPHYSIOLOGIST: Sergio Vyas MD  REFERRING PHYSICIAN: Radha Tomlin DO  CHIEF COMPLAINT: Persistent atrial fibrillation    HPI: This is a 74 y.o. female with a history of   Patient Active Problem List   Diagnosis    Vitamin D deficiency    Mixed hyperlipidemia    Atrial fibrillation (HCC)    JESSICA (obstructive sleep apnea)    Tubular adenoma of colon    Acquired hypothyroidism    Diverticulosis    Morbid obesity with BMI of 45.0-49.9, adult    Essential hypertension   who presents to cardiac electrophysiology clinic for consultation of persistent atrial fibrillation. The patient has history of  persistent atrial fibrillation, hypertension, hyperlipidemia, hypothyroidism, obesity and mild JESSICA. The patient was diagnosed with atrial fibrillation in January 2019 when she presented with cellulitis and was noted to be in AF. The rhythm converted spontaneously. He was seen by Cardiology and was started on Metoprolol and Eliquis. Her last known NSR was 8/30/22. She was documented being in atrial fibrillation on EKG since 7/8/24. She has echocardiogram on 8/22/24 which showed LV EF 60-65%, mild MR and mild LAE. Nuclear stress test 8/19/24 showed no ischemia. The patient reports she is unaware when she is out of rhythm, her only complaint is mild fatigue which she attributes to Metoprolol. She presents today in AF with CVR.The patient denies any chest pain, dyspnea, palpitations, dizziness, syncope, orthopnea or paroxysmal nocturnal dyspnea. Discussed with her regarding rhythm controlled treatment with DC-cardioversion +/- AAD therapy. She would like to try to switch to Cardizem first and see.    Patient Active Problem List    Diagnosis Date Noted    Morbid obesity with BMI of 45.0-49.9, adult 07/08/2024

## 2024-11-27 ENCOUNTER — OFFICE VISIT (OUTPATIENT)
Dept: NON INVASIVE DIAGNOSTICS | Age: 74
End: 2024-11-27

## 2024-11-27 VITALS
WEIGHT: 281 LBS | BODY MASS INDEX: 49.79 KG/M2 | HEART RATE: 98 BPM | HEIGHT: 63 IN | RESPIRATION RATE: 16 BRPM | OXYGEN SATURATION: 95 % | DIASTOLIC BLOOD PRESSURE: 80 MMHG | SYSTOLIC BLOOD PRESSURE: 124 MMHG | TEMPERATURE: 96.8 F

## 2024-11-27 DIAGNOSIS — I48.91 ATRIAL FIBRILLATION, UNSPECIFIED TYPE (HCC): Primary | ICD-10-CM

## 2024-11-27 RX ORDER — DILTIAZEM HYDROCHLORIDE 120 MG/1
120 CAPSULE, COATED, EXTENDED RELEASE ORAL DAILY
Qty: 90 CAPSULE | Refills: 1 | Status: SHIPPED | OUTPATIENT
Start: 2024-11-27

## 2024-12-02 RX ORDER — ERGOCALCIFEROL 1.25 MG/1
CAPSULE, LIQUID FILLED ORAL
Qty: 12 CAPSULE | Refills: 1 | Status: SHIPPED | OUTPATIENT
Start: 2024-12-02

## 2024-12-02 NOTE — TELEPHONE ENCOUNTER
Name of Medication(s) Requested:  Requested Prescriptions     Pending Prescriptions Disp Refills    vitamin D (ERGOCALCIFEROL) 1.25 MG (72891 UT) CAPS capsule [Pharmacy Med Name: VITAMIN D2 CAP 50,000IU] 12 capsule 1     Sig: TAKE 1 CAPSULE WEEKLY      (CANCEL D3-50)       Medication is on current medication list Yes    Dosage and directions were verified? Yes    Quantity verified: 90 day supply     Pharmacy Verified?  Yes    Last Appointment:  7/8/2024    Future appts:  Future Appointments   Date Time Provider Department Center   1/8/2025  7:30 AM [facility]   2/24/2025  7:30 AM Grabiel Brown DPM Col Podiatry [facility]   2/28/2025  8:00 AM Sergio Vyas MD ELECTRO PHYS [facility]   7/14/2025  7:30 AM [facility]        (If no appt send self scheduling link. .REFILLAPPT)  Scheduling request sent?     [] Yes  [x] No    Does patient need updated?  [] Yes  [x] No

## 2025-01-03 DIAGNOSIS — E55.9 VITAMIN D DEFICIENCY: ICD-10-CM

## 2025-01-03 DIAGNOSIS — R73.03 PREDIABETES: ICD-10-CM

## 2025-01-03 DIAGNOSIS — E78.2 MIXED HYPERLIPIDEMIA: ICD-10-CM

## 2025-01-03 DIAGNOSIS — E03.9 ACQUIRED HYPOTHYROIDISM: ICD-10-CM

## 2025-01-03 LAB
ALBUMIN: 4.3 G/DL (ref 3.5–5.2)
ALP BLD-CCNC: 85 U/L (ref 35–104)
ALT SERPL-CCNC: 17 U/L (ref 0–32)
ANION GAP SERPL CALCULATED.3IONS-SCNC: 17 MMOL/L (ref 7–16)
AST SERPL-CCNC: 21 U/L (ref 0–31)
BASOPHILS ABSOLUTE: 0.03 K/UL (ref 0–0.2)
BASOPHILS RELATIVE PERCENT: 0 % (ref 0–2)
BILIRUB SERPL-MCNC: 0.5 MG/DL (ref 0–1.2)
BUN BLDV-MCNC: 15 MG/DL (ref 6–23)
CALCIUM SERPL-MCNC: 9.3 MG/DL (ref 8.6–10.2)
CHLORIDE BLD-SCNC: 104 MMOL/L (ref 98–107)
CHOLESTEROL, TOTAL: 151 MG/DL
CO2: 22 MMOL/L (ref 22–29)
CREAT SERPL-MCNC: 0.9 MG/DL (ref 0.5–1)
EOSINOPHILS ABSOLUTE: 0.28 K/UL (ref 0.05–0.5)
EOSINOPHILS RELATIVE PERCENT: 4 % (ref 0–6)
GFR, ESTIMATED: 70 ML/MIN/1.73M2
GLUCOSE BLD-MCNC: 100 MG/DL (ref 74–99)
HBA1C MFR BLD: 5.9 % (ref 4–5.6)
HCT VFR BLD CALC: 47.3 % (ref 34–48)
HDLC SERPL-MCNC: 44 MG/DL
HEMOGLOBIN: 14.9 G/DL (ref 11.5–15.5)
IMMATURE GRANULOCYTES %: 0 % (ref 0–5)
IMMATURE GRANULOCYTES ABSOLUTE: 0.03 K/UL (ref 0–0.58)
LDL CHOLESTEROL: 68 MG/DL
LYMPHOCYTES ABSOLUTE: 1.42 K/UL (ref 1.5–4)
LYMPHOCYTES RELATIVE PERCENT: 18 % (ref 20–42)
MCH RBC QN AUTO: 27.7 PG (ref 26–35)
MCHC RBC AUTO-ENTMCNC: 31.5 G/DL (ref 32–34.5)
MCV RBC AUTO: 87.9 FL (ref 80–99.9)
MONOCYTES ABSOLUTE: 0.65 K/UL (ref 0.1–0.95)
MONOCYTES RELATIVE PERCENT: 8 % (ref 2–12)
NEUTROPHILS ABSOLUTE: 5.5 K/UL (ref 1.8–7.3)
NEUTROPHILS RELATIVE PERCENT: 70 % (ref 43–80)
PDW BLD-RTO: 13.9 % (ref 11.5–15)
PLATELET # BLD: 213 K/UL (ref 130–450)
PMV BLD AUTO: 12 FL (ref 7–12)
POTASSIUM SERPL-SCNC: 4.6 MMOL/L (ref 3.5–5)
RBC # BLD: 5.38 M/UL (ref 3.5–5.5)
SODIUM BLD-SCNC: 143 MMOL/L (ref 132–146)
TOTAL PROTEIN: 7.7 G/DL (ref 6.4–8.3)
TRIGL SERPL-MCNC: 195 MG/DL
TSH SERPL DL<=0.05 MIU/L-ACNC: 1.26 UIU/ML (ref 0.27–4.2)
VITAMIN D 25-HYDROXY: 35.9 NG/ML (ref 30–100)
VLDLC SERPL CALC-MCNC: 39 MG/DL
WBC # BLD: 7.9 K/UL (ref 4.5–11.5)

## 2025-01-05 SDOH — ECONOMIC STABILITY: INCOME INSECURITY: HOW HARD IS IT FOR YOU TO PAY FOR THE VERY BASICS LIKE FOOD, HOUSING, MEDICAL CARE, AND HEATING?: NOT HARD AT ALL

## 2025-01-05 SDOH — ECONOMIC STABILITY: FOOD INSECURITY: WITHIN THE PAST 12 MONTHS, THE FOOD YOU BOUGHT JUST DIDN'T LAST AND YOU DIDN'T HAVE MONEY TO GET MORE.: NEVER TRUE

## 2025-01-05 SDOH — ECONOMIC STABILITY: FOOD INSECURITY: WITHIN THE PAST 12 MONTHS, YOU WORRIED THAT YOUR FOOD WOULD RUN OUT BEFORE YOU GOT MONEY TO BUY MORE.: NEVER TRUE

## 2025-01-05 ASSESSMENT — PATIENT HEALTH QUESTIONNAIRE - PHQ9
SUM OF ALL RESPONSES TO PHQ QUESTIONS 1-9: 0
1. LITTLE INTEREST OR PLEASURE IN DOING THINGS: NOT AT ALL
SUM OF ALL RESPONSES TO PHQ QUESTIONS 1-9: 0
2. FEELING DOWN, DEPRESSED OR HOPELESS: NOT AT ALL
SUM OF ALL RESPONSES TO PHQ9 QUESTIONS 1 & 2: 0
SUM OF ALL RESPONSES TO PHQ QUESTIONS 1-9: 0
SUM OF ALL RESPONSES TO PHQ9 QUESTIONS 1 & 2: 0
2. FEELING DOWN, DEPRESSED OR HOPELESS: NOT AT ALL
1. LITTLE INTEREST OR PLEASURE IN DOING THINGS: NOT AT ALL
SUM OF ALL RESPONSES TO PHQ QUESTIONS 1-9: 0

## 2025-01-08 ENCOUNTER — OFFICE VISIT (OUTPATIENT)
Dept: FAMILY MEDICINE CLINIC | Age: 75
End: 2025-01-08

## 2025-01-08 VITALS
HEART RATE: 66 BPM | SYSTOLIC BLOOD PRESSURE: 116 MMHG | BODY MASS INDEX: 49.25 KG/M2 | RESPIRATION RATE: 20 BRPM | DIASTOLIC BLOOD PRESSURE: 73 MMHG | WEIGHT: 278 LBS | TEMPERATURE: 97.8 F | OXYGEN SATURATION: 99 %

## 2025-01-08 DIAGNOSIS — N39.490 URINARY INCONTINENCE, OVERFLOW: ICD-10-CM

## 2025-01-08 DIAGNOSIS — Z23 NEED FOR INFLUENZA VACCINATION: ICD-10-CM

## 2025-01-08 DIAGNOSIS — E03.9 ACQUIRED HYPOTHYROIDISM: ICD-10-CM

## 2025-01-08 DIAGNOSIS — E78.1 PURE HYPERTRIGLYCERIDEMIA: ICD-10-CM

## 2025-01-08 DIAGNOSIS — E55.9 VITAMIN D DEFICIENCY: ICD-10-CM

## 2025-01-08 DIAGNOSIS — R73.03 PREDIABETES: ICD-10-CM

## 2025-01-08 DIAGNOSIS — G47.33 OSA (OBSTRUCTIVE SLEEP APNEA): ICD-10-CM

## 2025-01-08 DIAGNOSIS — I10 ESSENTIAL HYPERTENSION: Primary | ICD-10-CM

## 2025-01-08 DIAGNOSIS — Z12.31 ENCOUNTER FOR SCREENING MAMMOGRAM FOR MALIGNANT NEOPLASM OF BREAST: ICD-10-CM

## 2025-01-08 DIAGNOSIS — I48.11 LONGSTANDING PERSISTENT ATRIAL FIBRILLATION (HCC): ICD-10-CM

## 2025-01-08 SDOH — ECONOMIC STABILITY: FOOD INSECURITY: WITHIN THE PAST 12 MONTHS, THE FOOD YOU BOUGHT JUST DIDN'T LAST AND YOU DIDN'T HAVE MONEY TO GET MORE.: NEVER TRUE

## 2025-01-08 SDOH — ECONOMIC STABILITY: FOOD INSECURITY: WITHIN THE PAST 12 MONTHS, YOU WORRIED THAT YOUR FOOD WOULD RUN OUT BEFORE YOU GOT MONEY TO BUY MORE.: NEVER TRUE

## 2025-01-08 NOTE — PROGRESS NOTES
2025    Maricruz Booth is a 74 y.o. female here for:    Chief Complaint   Patient presents with    Hypothyroidism    Hypertension    Atrial Fibrillation    Hyperlipidemia    Sleep Apnea        HPI:  HTN   - On diltiazem 120 mg HS. Reports compliance with medication. Denies side effects of medication.   - Denies chest pain, double vision, blurry vision, and syncope.      HLD   - On rosuvastatin 5 mg HS. Reports compliance with medication. Denies side effects of medication.   The 10-year ASCVD risk score (Shirley DK, et al., 2019) is: 9.4%    Values used to calculate the score:      Age: 74 years      Sex: Female      Is Non- : No      Diabetic: No      Tobacco smoker: No      Systolic Blood Pressure: 116 mmHg      Is BP treated: No      HDL Cholesterol: 44 mg/dL      Total Cholesterol: 151 mg/dL     Hypothyroidism   - On Synthroid 125 mcg QD. Reports compliance with medication. Denies side effects of medication.   - Admits to fatigue and palpitations. Denies constipation and hair loss.      Atrial fibrillation   - On Eliquis 5 mg BID and diltiazem 120 mg HS. Reports compliance with medications. Denies side effects of medications.   - Denies nosebleeds and blood in stool.   - Denies chest pain. Admits to fatigue and shortness of breath with exertional activity, such as going up 1 flight of stairs. Had unremarkable echocardiogram and NM cardiac stress test in 2024.   - Following with Electrophysiology, Dr. Vyas. Will be getting DCCV. Date TBD.      JESSICA  - On CPAP. Wears CPAP nightly.  - Follows with Sleep Medicine, Dr. Muñiz. Last seen in 2020.     Urinary incontinence   - Started a few years ago  - , C-sections   - S/p hysterectomy 20+ years ago   - Reports a constant dribbling. Has to wear pads.   - Wants to hold off on medication at this time        Current Outpatient Medications   Medication Sig Dispense Refill    vitamin D (ERGOCALCIFEROL) 1.25 MG (24072 UT) CAPS

## 2025-01-16 ENCOUNTER — TELEPHONE (OUTPATIENT)
Dept: FAMILY MEDICINE CLINIC | Age: 75
End: 2025-01-16

## 2025-01-16 DIAGNOSIS — G47.33 OSA ON CPAP: Primary | ICD-10-CM

## 2025-01-16 NOTE — TELEPHONE ENCOUNTER
Patient called and said she tried to call doctor [facility] office to get an appt but has not been there in 5 years and they need a new referral. Please place referral to Dr. Fahad [place] [address] Please advise.

## 2025-01-16 NOTE — TELEPHONE ENCOUNTER
New referral placed to Sleep Medicine, Dr. Moy (same office as Dr. Muñiz). Please fax referral if needed. Thanks!

## 2025-01-26 DIAGNOSIS — E78.2 MIXED HYPERLIPIDEMIA: ICD-10-CM

## 2025-01-27 RX ORDER — ROSUVASTATIN CALCIUM 5 MG/1
5 TABLET, COATED ORAL NIGHTLY
Qty: 90 TABLET | Refills: 1 | Status: SHIPPED | OUTPATIENT
Start: 2025-01-27

## 2025-01-27 NOTE — TELEPHONE ENCOUNTER
Name of Medication(s) Requested:  Requested Prescriptions     Pending Prescriptions Disp Refills    rosuvastatin (CRESTOR) 5 MG tablet [Pharmacy Med Name: ROSUVASTATIN TAB 5MG] 90 tablet 1     Sig: TAKE 1 TABLET NIGHTLY       Medication is on current medication list Yes    Dosage and directions were verified? Yes    Quantity verified: 90 day supply     Pharmacy Verified?  Yes    Last Appointment:  1/8/2025    Future appts:  Future Appointments   Date Time Provider Department Center   1/29/2025  9:00 AM [facility]   2/24/2025  7:30 AM Grabiel Brown DPM Col Podiatry [facility]   2/28/2025  8:00 AM Sergio Vyas MD ELECTRO PHYS [facility]   3/19/2025  1:20 PM Maria Elena Moy MD [facility]   7/14/2025  7:30 AM Radha Tomlin DO [facility] ECC DEP        (If no appt send self scheduling link. .REFILLAPPT)  Scheduling request sent?     [] Yes  [] No    Does patient need updated?  [] Yes  [] No

## 2025-02-24 ENCOUNTER — PROCEDURE VISIT (OUTPATIENT)
Dept: PODIATRY | Age: 75
End: 2025-02-24
Payer: MEDICARE

## 2025-02-24 ENCOUNTER — OFFICE VISIT (OUTPATIENT)
Dept: ORTHOPEDIC SURGERY | Age: 75
End: 2025-02-24
Payer: MEDICARE

## 2025-02-24 VITALS
TEMPERATURE: 97 F | DIASTOLIC BLOOD PRESSURE: 79 MMHG | SYSTOLIC BLOOD PRESSURE: 110 MMHG | BODY MASS INDEX: 49.26 KG/M2 | HEIGHT: 63 IN | OXYGEN SATURATION: 99 % | WEIGHT: 278 LBS | HEART RATE: 74 BPM

## 2025-02-24 VITALS
SYSTOLIC BLOOD PRESSURE: 134 MMHG | DIASTOLIC BLOOD PRESSURE: 76 MMHG | HEART RATE: 76 BPM | OXYGEN SATURATION: 96 % | TEMPERATURE: 97.1 F | WEIGHT: 278 LBS | BODY MASS INDEX: 49.25 KG/M2

## 2025-02-24 DIAGNOSIS — M79.674 PAIN IN TOE OF RIGHT FOOT: ICD-10-CM

## 2025-02-24 DIAGNOSIS — L60.0 OC (ONYCHOCRYPTOSIS): ICD-10-CM

## 2025-02-24 DIAGNOSIS — B35.1 DERMATOPHYTOSIS OF NAIL: ICD-10-CM

## 2025-02-24 DIAGNOSIS — B35.3 TINEA PEDIS OF BOTH FEET: ICD-10-CM

## 2025-02-24 DIAGNOSIS — T14.8XXA HEMATOMA: Primary | ICD-10-CM

## 2025-02-24 DIAGNOSIS — M25.562 ACUTE PAIN OF LEFT KNEE: ICD-10-CM

## 2025-02-24 DIAGNOSIS — I73.9 PERIPHERAL VASCULAR DISEASE, UNSPECIFIED: ICD-10-CM

## 2025-02-24 DIAGNOSIS — B35.1 TINEA UNGUIUM: Primary | ICD-10-CM

## 2025-02-24 DIAGNOSIS — M17.12 PRIMARY OSTEOARTHRITIS OF LEFT KNEE: ICD-10-CM

## 2025-02-24 DIAGNOSIS — L60.0 INGROWN NAIL: ICD-10-CM

## 2025-02-24 DIAGNOSIS — M79.675 PAIN IN LEFT TOE(S): ICD-10-CM

## 2025-02-24 PROCEDURE — 1123F ACP DISCUSS/DSCN MKR DOCD: CPT | Performed by: PHYSICIAN ASSISTANT

## 2025-02-24 PROCEDURE — 1159F MED LIST DOCD IN RCRD: CPT | Performed by: PHYSICIAN ASSISTANT

## 2025-02-24 PROCEDURE — 11721 DEBRIDE NAIL 6 OR MORE: CPT | Performed by: PODIATRIST

## 2025-02-24 PROCEDURE — 99213 OFFICE O/P EST LOW 20 MIN: CPT | Performed by: PHYSICIAN ASSISTANT

## 2025-02-24 PROCEDURE — 3078F DIAST BP <80 MM HG: CPT | Performed by: PHYSICIAN ASSISTANT

## 2025-02-24 PROCEDURE — 1125F AMNT PAIN NOTED PAIN PRSNT: CPT | Performed by: PHYSICIAN ASSISTANT

## 2025-02-24 PROCEDURE — 3074F SYST BP LT 130 MM HG: CPT | Performed by: PHYSICIAN ASSISTANT

## 2025-02-24 NOTE — PROGRESS NOTES
[facility]  [facility]    Chief Complaint   Patient presents with    Toe Pain    Nail Problem     Radha Tomlin DO  1/26/25       Subjective: This Maricruz Booth comes to office for foot and nail care.  Pt currently has complaint of thickened, painful, elongated nails that he/she cannot manage by themselves.  Pt. Relates pain to nails with shoe gear.  Pt's primary care physician is Radha Tomlin DO.  Past Medical History:   Diagnosis Date    A-fib (HCC)     Diverticulosis 06/09/2021    on colonoscopy    Hyperlipidemia     Hypothyroidism     JESSICA (obstructive sleep apnea)     On CPAP, wears nightly; follows with Sleep Medicine, Dr. Muñiz    Prediabetes 10/11/2022    Tubular adenoma of colon 06/09/2021    on colonoscopy    Vitamin D deficiency        No Known Allergies  Current Outpatient Medications on File Prior to Visit   Medication Sig Dispense Refill    rosuvastatin (CRESTOR) 5 MG tablet TAKE 1 TABLET NIGHTLY 90 tablet 1    vitamin D (ERGOCALCIFEROL) 1.25 MG (50986 UT) CAPS capsule TAKE 1 CAPSULE WEEKLY      (CANCEL D3-50) 12 capsule 1    dilTIAZem (CARDIZEM CD) 120 MG extended release capsule Take 1 capsule by mouth daily 90 capsule 1    Efinaconazole 10 % SOLN Apply 8 mLs topically daily 8 mL 3    ELIQUIS 5 MG TABS tablet TAKE 1 TABLET TWICE A  tablet 1    levothyroxine (SYNTHROID) 125 MCG tablet Take 1 tablet by mouth daily 90 tablet 1    Coenzyme Q10 (CO Q 10 PO) Take by mouth daily      ammonium lactate (LAC-HYDRIN) 12 % lotion Apply to feet qd (Patient taking differently: Apply topically daily as needed for Dry Skin) 225 g 1     No current facility-administered medications on file prior to visit.     Review of Systems  Objective:  General: AAO x 3 in NAD.    Derm  Toenail Description  Sites of Onychomycosis Involvement (Check affected area)  [x] [x] [x] [x] [x] [x] [x] [x] [x] [x]  5 4 3 2 1 1 2 3 4 5                          Right

## 2025-02-24 NOTE — PROGRESS NOTES
[facility]  Established Patient New Problem Note      CHIEF COMPLAINT:   Chief Complaint   Patient presents with    Knee Pain     Pt presents this AM with c/o pain in her L knee. States that she fell earlier today. Has not taken anything to manage her symptoms.        HISTORY OF PRESENT ILLNESS:                The patient is a 74 y.o. female who presents today with complaints of left knee pain that began earlier today when she fell directly on the knee at home.  Pt denies head injury or LOC.  Pt localizes the pain to anterior aspect of knee.  Pt denies any numbness, tingling, loss of sensation or radiation of symptoms into toes.  Pain is worse with ROM secondary to edema.  They have tried at home therapies of ice for symptomatic relief.  Pt has never injured this knee in the past.         Past Medical History:        Diagnosis Date    A-fib (HCC)     Diverticulosis 2021    on colonoscopy    Hyperlipidemia     Hypothyroidism     JESSICA (obstructive sleep apnea)     On CPAP, wears nightly; follows with Sleep Medicine, Dr. Muñiz    Prediabetes 10/11/2022    Tubular adenoma of colon 2021    on colonoscopy    Vitamin D deficiency      Past Surgical History:        Procedure Laterality Date     SECTION N/A     x 2    COLONOSCOPY N/A 2021    COLONOSCOPY WITH BIOPSY [facility] by Grabiel Silverio DO [facility] ENDOSCOPY    HYSTERECTOMY, TOTAL ABDOMINAL (CERVIX REMOVED)      WISDOM TOOTH EXTRACTION       Current Medications:   No current facility-administered medications for this visit.  Allergies:  Patient has no known allergies.    Social History:   TOBACCO:   reports that she has never smoked. She has never used smokeless tobacco.  ETOH:   reports no history of alcohol use.  DRUGS:   reports no history of drug use.    Review of Systems   Constitutional: Negative for fever, chills, diaphoresis, appetite change and fatigue.   HENT: Negative for dental issues, hearing loss and

## 2025-02-26 ENCOUNTER — OFFICE VISIT (OUTPATIENT)
Dept: ORTHOPEDIC SURGERY | Age: 75
End: 2025-02-26
Payer: MEDICARE

## 2025-02-26 ENCOUNTER — TELEPHONE (OUTPATIENT)
Dept: ORTHOPEDIC SURGERY | Age: 75
End: 2025-02-26

## 2025-02-26 ENCOUNTER — HOSPITAL ENCOUNTER (OUTPATIENT)
Dept: CT IMAGING | Age: 75
Discharge: HOME | End: 2025-02-28
Payer: MEDICARE

## 2025-02-26 VITALS — BODY MASS INDEX: 49.26 KG/M2 | WEIGHT: 278 LBS | HEIGHT: 63 IN

## 2025-02-26 DIAGNOSIS — T14.8XXA HEMATOMA: Primary | ICD-10-CM

## 2025-02-26 DIAGNOSIS — M17.12 PRIMARY OSTEOARTHRITIS OF LEFT KNEE: ICD-10-CM

## 2025-02-26 DIAGNOSIS — L03.116 CELLULITIS OF LEFT LOWER EXTREMITY: ICD-10-CM

## 2025-02-26 DIAGNOSIS — T14.8XXA HEMATOMA: ICD-10-CM

## 2025-02-26 PROCEDURE — 1123F ACP DISCUSS/DSCN MKR DOCD: CPT | Performed by: PHYSICIAN ASSISTANT

## 2025-02-26 PROCEDURE — 1159F MED LIST DOCD IN RCRD: CPT | Performed by: PHYSICIAN ASSISTANT

## 2025-02-26 PROCEDURE — 1125F AMNT PAIN NOTED PAIN PRSNT: CPT | Performed by: PHYSICIAN ASSISTANT

## 2025-02-26 PROCEDURE — 73700 CT LOWER EXTREMITY W/O DYE: CPT

## 2025-02-26 PROCEDURE — 99212 OFFICE O/P EST SF 10 MIN: CPT | Performed by: PHYSICIAN ASSISTANT

## 2025-02-26 RX ORDER — CEPHALEXIN 500 MG/1
500 CAPSULE ORAL 2 TIMES DAILY
Qty: 14 CAPSULE | Refills: 0 | Status: SHIPPED | OUTPATIENT
Start: 2025-02-26 | End: 2025-03-05

## 2025-02-26 NOTE — TELEPHONE ENCOUNTER
CT shows large soft tissue hematoma.  No fracture appreciated.  Would recommend continuous close monitoring.  She should follow-up with PCP  for wound care.

## 2025-02-26 NOTE — TELEPHONE ENCOUNTER
Patient called in this morning for an update on her symptoms. She stated that the pain and swelling has pretty much stayed the same. She stated that the bruising has extended to the ankle. She stated that the abrasion she had on her knee is now seeping.  I asked if the drainage had a color or odor to it and she said it did not. I explained to her that if that changes, she should be seen immediately.     She also asked how long she should keep the knee wrapped. She requested that you contact her if possible, but I explained that if you were unable, one of us would call her with your suggestions.

## 2025-02-26 NOTE — TELEPHONE ENCOUNTER
Patient advised to come back into the office either today or tomorrow. She stated she would try to come in this afternoon for recheck.

## 2025-02-26 NOTE — PROGRESS NOTES
Established Patient Follow Up  [facility]    Chief Complaint   Patient presents with    Knee Pain     Pt presents this AM for a f/u concerning her L knee.          Subjective:  Pt is a 74 y.o. female, established pt who presents today for follow up of left knee pain status post fall.  Please refer to the last clinic note from 2/20/2025 as none of the patient's past medical hx has changed.  Pt reports increase in bruising.  Swelling and pain have remained the same.  She now has open wounds on the knee.    Objective:  There were no vitals filed for this visit.    Pt is alert and oriented x 3, NAD, sitting comfortable in the exam room today.  Patient has extensive ecchymosis localized to the left knee and lower extremity.  She has open skin abrasion at the distal pole of the patella.  She has scattered fluid-filled blisters on the anterior knee.  There does not appear to be any erythema however this is difficult to determine due to the extensive ecchymosis.  She continues to have tenderness to palpation throughout the knee.  Negative Homans' sign.    Radiology Imaging:  No new imaging at today's visit    Procedure:  None    Assessment:  Encounter Diagnoses   Name Primary?    Hematoma Yes    Primary osteoarthritis of left knee     Cellulitis of left lower extremity        Plan:  Pt returns to the clinic today for follow up of left knee pain status post fall.  On exam today she has significant extensive ecchymosis of the left knee and into the left lower extremity.  She has moderate edema.  She has skin abrasion at the distal pole of the patella as well as scattered fluid-filled blisters around the patella.  I do worry about this as a source for infection.  Going to start her on Keflex 500 mg 1 tablet twice daily x 7 days.  If she would develop any increase in edema, drainage, warmth, generalized systemic feeling of illness, fevers she would need to seek immediate medical attention.  You also want to

## 2025-02-26 NOTE — TELEPHONE ENCOUNTER
Contacted patient and explained that the CT did not show any sign of a fracture and did show the large hematoma. Advised patient to continue to ice, use light compression and to follow with her primary care provider for wound follow up. Patient verbalized her understanding and agreed with this plan.

## 2025-02-27 NOTE — PROGRESS NOTES
non-tender, bowel sounds present, no masses or hepatomegaly   Musculoskeletal: no digital clubbing, no edema, left knee bruising, scrapped wound and suspected hematoma  at left knee and calf  Skin: warm, no rashes     I have personally reviewed the laboratory, cardiac diagnostic and radiographic testing as outlined below:    Data:    No results for input(s): \"WBC\", \"HGB\", \"HCT\", \"PLT\" in the last 72 hours.  No results for input(s): \"NA\", \"K\", \"CL\", \"CO2\", \"BUN\", \"CREATININE\", \"GLU\", \"CALCIUM\", \"MAGNESIUM\" in the last 72 hours.   No results found for: \"MG\"  No results for input(s): \"TSH\" in the last 72 hours.  No results for input(s): \"INR\" in the last 72 hours.    CXR 20:   FINDINGS:  The lungs are clear.  The heart is top-normal in size.  The aorta is  tortuous.  No pulmonary vascular congestion. No pneumothorax or pleural  effusion is seen.  IMPRESSION:  No acute cardiopulmonary abnormality.    EK25: AF, rate: 99 bpm, QRS: 78, QTC: 396 msec - Please see scan in Cardiology.    Echocardiogram 24:     Left Ventricle: Normal left ventricular systolic function with a visually estimated EF of 60 - 65%. Left ventricle size is normal. Normal wall thickness. Normal wall motion.    Mitral Valve: Mild regurgitation.    Left Atrium: Left atrium is mildly dilated.    Atrial fibrillation    Echocardiogram 18:  Findings      Left Ventricle   Normal left ventricle size and systolic function   Stage I diastolic dysfunction   No wall motion abnormalities   Ejection fraction is visually estimated at 65%.      Right Ventricle   Normal right ventricle structure and function.      Left Atrium   Normal sized left atrium.      Right Atrium   Normal right atrium size.      Mitral Valve   Mild mitral annular calcification   Trace mitral regurgitation   No mitral stenosis      Tricuspid Valve   Trace tricuspid regurgitation.   RVSP is 32 mmHg.      Aortic Valve   Focal calcification of the aortic valve   [place]

## 2025-02-28 ENCOUNTER — OFFICE VISIT (OUTPATIENT)
Dept: NON INVASIVE DIAGNOSTICS | Age: 75
End: 2025-02-28
Payer: MEDICARE

## 2025-02-28 ENCOUNTER — OFFICE VISIT (OUTPATIENT)
Dept: FAMILY MEDICINE CLINIC | Age: 75
End: 2025-02-28

## 2025-02-28 VITALS
OXYGEN SATURATION: 98 % | SYSTOLIC BLOOD PRESSURE: 118 MMHG | HEART RATE: 83 BPM | TEMPERATURE: 98.1 F | DIASTOLIC BLOOD PRESSURE: 66 MMHG

## 2025-02-28 VITALS
DIASTOLIC BLOOD PRESSURE: 68 MMHG | HEART RATE: 99 BPM | OXYGEN SATURATION: 98 % | BODY MASS INDEX: 49.08 KG/M2 | HEIGHT: 63 IN | WEIGHT: 277 LBS | TEMPERATURE: 98.2 F | RESPIRATION RATE: 18 BRPM | SYSTOLIC BLOOD PRESSURE: 112 MMHG

## 2025-02-28 DIAGNOSIS — I48.91 ATRIAL FIBRILLATION, UNSPECIFIED TYPE (HCC): ICD-10-CM

## 2025-02-28 DIAGNOSIS — S80.12XS: ICD-10-CM

## 2025-02-28 DIAGNOSIS — S80.02XS TRAUMATIC HEMATOMA OF LEFT KNEE, SEQUELA: Primary | ICD-10-CM

## 2025-02-28 DIAGNOSIS — I48.91 ATRIAL FIBRILLATION, UNSPECIFIED TYPE (HCC): Primary | ICD-10-CM

## 2025-02-28 PROCEDURE — 1160F RVW MEDS BY RX/DR IN RCRD: CPT | Performed by: INTERNAL MEDICINE

## 2025-02-28 PROCEDURE — 1159F MED LIST DOCD IN RCRD: CPT | Performed by: INTERNAL MEDICINE

## 2025-02-28 PROCEDURE — 3074F SYST BP LT 130 MM HG: CPT | Performed by: INTERNAL MEDICINE

## 2025-02-28 PROCEDURE — 3078F DIAST BP <80 MM HG: CPT | Performed by: INTERNAL MEDICINE

## 2025-02-28 PROCEDURE — 99215 OFFICE O/P EST HI 40 MIN: CPT | Performed by: INTERNAL MEDICINE

## 2025-02-28 PROCEDURE — 1123F ACP DISCUSS/DSCN MKR DOCD: CPT | Performed by: INTERNAL MEDICINE

## 2025-02-28 PROCEDURE — 93000 ELECTROCARDIOGRAM COMPLETE: CPT | Performed by: INTERNAL MEDICINE

## 2025-02-28 RX ORDER — DILTIAZEM HYDROCHLORIDE 120 MG/1
120 CAPSULE, COATED, EXTENDED RELEASE ORAL DAILY
Qty: 90 CAPSULE | Refills: 3 | Status: SHIPPED | OUTPATIENT
Start: 2025-02-28

## 2025-02-28 NOTE — PROGRESS NOTES
Chief Complaint:  Bleeding/Bruising    History of Present Illness:  Source of history provided by:  patient.      Maricruz Booth is a 74 y.o. female who presents for ***. Denies fever, chills, cough, congestion, ear pain, sore throat, neck pain, CP, SOB, N/V/D, abdominal pain, dysuria, hematuria, increased frequency, dizziness, rash, or lethargy.     Review of Systems: Unless otherwise stated in this report or unable to obtain because of the patient's clinical or mental status as evidenced by the medical record, this patients's positive and negative responses for Review of Systems, constitutional, psych, eyes, ENT, cardiovascular, respiratory, gastrointestinal, neurological, genitourinary, musculoskeletal, integument systems and systems related to the presenting problem are either stated in the preceding or were not pertinent or were negative for the symptoms and/or complaints related to the medical problem.    Past Medical History:  has a past medical history of A-fib (HCC), Diverticulosis, Hyperlipidemia, Hypothyroidism, JESSICA (obstructive sleep apnea), Prediabetes, Tubular adenoma of colon, and Vitamin D deficiency.  Past Surgical History:  has a past surgical history that includes Hysterectomy, total abdominal;  section (N/A); [place] tooth extraction; and Colonoscopy (N/A, 2021).  Social History:  reports that she has never smoked. She has never used smokeless tobacco. She reports that she does not drink alcohol and does not use drugs.  Family History: family history includes Arthritis in her brother and brother; Atrial Fibrillation in her paternal grandmother; COPD in her brother and father; Colon Cancer in her paternal grandfather; Colon Polyps in her father; Deep Vein Thrombosis in her maternal grandmother; Glaucoma in her brother; Heart Attack in her brother; Heart Disease in her maternal grandfather; Hemochromatosis in her brother; High Blood Pressure in her brother; Hypothyroidism in her brother, 
     Tenderness:  No TTP over calf, ankle, or foot. Mild edema noted. Negative Kina's sign.            ROM: FROM without pain or deficits.       Neurovascular:             Sensory deficit: Sensation intact above and below the injury site.             Pulse deficit: Pulses 2+ and bounding.             Capillary refill: Less then 2 sec throughout.  Gait:  Slightly antalgic gait, but able to bear weight with mild difficulty.  Lymphatics: No lymphangitis or adenopathy noted.  Neurological:  Alert and oriented.  Motor functions intact.        Lab / Imaging Results   (All laboratory and radiology results have been personally reviewed by myself)  Labs:    Imaging:  All Radiology results interpreted by Radiologist unless otherwise noted.    Assessment / Plan     Impression(s):  1. Traumatic hematoma of left knee, sequela    2. Traumatic ecchymosis of left lower leg, sequela      Disposition:  Disposition: Discharge to home    Continue with eliquis per EP. Continue to let air out as much as possible. RICE protocol advised. F/u in 1 week. To call or to ED sooner if symptoms worsen or change. ED immediately with any calf pain/swelling, severe/worsening knee pain, paresthesias, weakness, fever, CP, or SOB. Pt states understanding and is in agreement with this care plan. All questions answered.    Seen by myself and Dr. Radha Tomlin. Reviewed previous urgent care notes and imaging. More than 30 minutes of face-to-face time was spent with the patient during the encounter, during which more than half of that time was spent counseling and/or coordinating her care.

## 2025-03-01 DIAGNOSIS — E03.9 ACQUIRED HYPOTHYROIDISM: ICD-10-CM

## 2025-03-02 NOTE — TELEPHONE ENCOUNTER
Name of Medication(s) Requested:  Requested Prescriptions     Pending Prescriptions Disp Refills    SYNTHROID 125 MCG tablet [Pharmacy Med Name: SYNTHROID TAB 125MCG] 90 tablet 1     Sig: TAKE 1 TABLET DAILY       Medication is on current medication list Yes    Dosage and directions were verified? Yes    Quantity verified: 90 day supply     Pharmacy Verified?  Yes    Last Appointment:  1/8/2025    Future appts:  Future Appointments   Date Time Provider Department Center   3/7/2025  9:00 AM Amara Hermosillo PA-C [facility] DEP   3/19/2025  1:20 PM Maria Elena Moy MD [facility]   6/2/2025  7:30 AM Grabiel Brown DPM Col Podiatry [facility]   7/14/2025  7:30 AM Radha Tomlin DO [facility] DEP        (If no appt send self scheduling link. .REFILLAPPT)  Scheduling request sent?     [] Yes  [x] No    Does patient need updated?  [] Yes  [x] No

## 2025-03-03 RX ORDER — LEVOTHYROXINE SODIUM 125 MCG
125 TABLET ORAL DAILY
Qty: 90 TABLET | Refills: 1 | Status: SHIPPED | OUTPATIENT
Start: 2025-03-03

## 2025-03-03 RX ORDER — DOXYCYCLINE HYCLATE 100 MG
100 TABLET ORAL 2 TIMES DAILY
Qty: 14 TABLET | Refills: 0 | Status: SHIPPED
Start: 2025-03-03 | End: 2025-03-07

## 2025-03-03 RX ORDER — CEPHALEXIN 500 MG/1
500 CAPSULE ORAL 2 TIMES DAILY
Qty: 14 CAPSULE | Refills: 0 | Status: SHIPPED
Start: 2025-03-03 | End: 2025-03-07 | Stop reason: ALTCHOICE

## 2025-03-04 ENCOUNTER — PREP FOR PROCEDURE (OUTPATIENT)
Dept: NON INVASIVE DIAGNOSTICS | Age: 75
End: 2025-03-04

## 2025-03-04 RX ORDER — SODIUM CHLORIDE 9 MG/ML
INJECTION, SOLUTION INTRAVENOUS PRN
Status: CANCELLED | OUTPATIENT
Start: 2025-03-04

## 2025-03-04 RX ORDER — SODIUM CHLORIDE 0.9 % (FLUSH) 0.9 %
5-40 SYRINGE (ML) INJECTION EVERY 12 HOURS SCHEDULED
Status: CANCELLED | OUTPATIENT
Start: 2025-03-04

## 2025-03-04 RX ORDER — SODIUM CHLORIDE 0.9 % (FLUSH) 0.9 %
5-40 SYRINGE (ML) INJECTION PRN
Status: CANCELLED | OUTPATIENT
Start: 2025-03-04

## 2025-03-07 ENCOUNTER — HOSPITAL ENCOUNTER (OUTPATIENT)
Dept: ULTRASOUND IMAGING | Age: 75
Discharge: HOME | End: 2025-03-09
Payer: MEDICARE

## 2025-03-07 ENCOUNTER — OFFICE VISIT (OUTPATIENT)
Dept: FAMILY MEDICINE CLINIC | Age: 75
End: 2025-03-07

## 2025-03-07 VITALS
TEMPERATURE: 98 F | SYSTOLIC BLOOD PRESSURE: 128 MMHG | RESPIRATION RATE: 22 BRPM | DIASTOLIC BLOOD PRESSURE: 80 MMHG | WEIGHT: 275.2 LBS | HEART RATE: 103 BPM | OXYGEN SATURATION: 99 % | HEIGHT: 63 IN | BODY MASS INDEX: 48.76 KG/M2

## 2025-03-07 DIAGNOSIS — S80.12XS: ICD-10-CM

## 2025-03-07 DIAGNOSIS — M79.89 LEG SWELLING: ICD-10-CM

## 2025-03-07 DIAGNOSIS — S80.02XS TRAUMATIC HEMATOMA OF LEFT KNEE, SEQUELA: ICD-10-CM

## 2025-03-07 DIAGNOSIS — S80.02XS TRAUMATIC HEMATOMA OF LEFT KNEE, SEQUELA: Primary | ICD-10-CM

## 2025-03-07 PROCEDURE — 93971 EXTREMITY STUDY: CPT

## 2025-03-07 RX ORDER — DOXYCYCLINE HYCLATE 100 MG
100 TABLET ORAL 2 TIMES DAILY WITH MEALS
Qty: 14 TABLET | Refills: 0 | Status: SHIPPED | OUTPATIENT
Start: 2025-03-07 | End: 2025-03-14

## 2025-03-07 RX ORDER — MUPIROCIN 20 MG/G
OINTMENT TOPICAL
Qty: 22 G | Refills: 0 | Status: SHIPPED | OUTPATIENT
Start: 2025-03-07 | End: 2025-03-14

## 2025-03-10 ENCOUNTER — OFFICE VISIT (OUTPATIENT)
Dept: FAMILY MEDICINE CLINIC | Age: 75
End: 2025-03-10
Payer: MEDICARE

## 2025-03-10 ENCOUNTER — RESULTS FOLLOW-UP (OUTPATIENT)
Dept: ULTRASOUND IMAGING | Age: 75
End: 2025-03-10

## 2025-03-10 VITALS
DIASTOLIC BLOOD PRESSURE: 60 MMHG | BODY MASS INDEX: 49.64 KG/M2 | RESPIRATION RATE: 22 BRPM | WEIGHT: 275.8 LBS | OXYGEN SATURATION: 98 % | HEART RATE: 102 BPM | SYSTOLIC BLOOD PRESSURE: 92 MMHG | TEMPERATURE: 97.9 F

## 2025-03-10 DIAGNOSIS — L08.9 WOUND INFECTION: Primary | ICD-10-CM

## 2025-03-10 DIAGNOSIS — T14.8XXA WOUND INFECTION: Primary | ICD-10-CM

## 2025-03-10 DIAGNOSIS — T14.8XXA HEMATOMA: ICD-10-CM

## 2025-03-10 PROCEDURE — 3078F DIAST BP <80 MM HG: CPT | Performed by: FAMILY MEDICINE

## 2025-03-10 PROCEDURE — 99213 OFFICE O/P EST LOW 20 MIN: CPT | Performed by: FAMILY MEDICINE

## 2025-03-10 PROCEDURE — 1160F RVW MEDS BY RX/DR IN RCRD: CPT | Performed by: FAMILY MEDICINE

## 2025-03-10 PROCEDURE — 1123F ACP DISCUSS/DSCN MKR DOCD: CPT | Performed by: FAMILY MEDICINE

## 2025-03-10 PROCEDURE — 1159F MED LIST DOCD IN RCRD: CPT | Performed by: FAMILY MEDICINE

## 2025-03-10 PROCEDURE — 3074F SYST BP LT 130 MM HG: CPT | Performed by: FAMILY MEDICINE

## 2025-03-10 NOTE — PROGRESS NOTES
3/10/2025    Maricruz Booth is a 74 y.o. female here for:    Chief Complaint   Patient presents with    Wound Check     Left leg, left knee        HPI:  Wound Check   - Patient presents today for follow-up of left lower extremity wound and hematoma. Had unremarkable US on 2025, no DVT. Has been taking doxycycline 100 mg BID, on Day 7 of 14. Has been using mupirocin on superficial abrasion on left knee as well.   - Denies fevers and chills. Feels that left lower extremity pain and bruising are improving.     Current Outpatient Medications   Medication Sig Dispense Refill    mupirocin (BACTROBAN) 2 % ointment Apply topically 3 times daily. 22 g 0    doxycycline hyclate (VIBRA-TABS) 100 MG tablet Take 1 tablet by mouth 2 times daily (with meals) for 7 days 14 tablet 0    SYNTHROID 125 MCG tablet TAKE 1 TABLET DAILY 90 tablet 1    dilTIAZem (CARDIZEM CD) 120 MG extended release capsule Take 1 capsule by mouth daily 90 capsule 3    rosuvastatin (CRESTOR) 5 MG tablet TAKE 1 TABLET NIGHTLY 90 tablet 1    vitamin D (ERGOCALCIFEROL) 1.25 MG (35219 UT) CAPS capsule TAKE 1 CAPSULE WEEKLY      (CANCEL D3-50) 12 capsule 1    Efinaconazole 10 % SOLN Apply 8 mLs topically daily 8 mL 3    ELIQUIS 5 MG TABS tablet TAKE 1 TABLET TWICE A  tablet 1    Coenzyme Q10 (CO Q 10 PO) Take by mouth daily      ammonium lactate (LAC-HYDRIN) 12 % lotion Apply to feet qd 225 g 1     No current facility-administered medications for this visit.      No Known Allergies    Past Medical & Surgical History:      Diagnosis Date    A-fib (HCC)     Diverticulosis 2021    on colonoscopy    Hyperlipidemia     Hypothyroidism     JESSICA (obstructive sleep apnea)     On CPAP, wears nightly; follows with Sleep Medicine, Dr. Muñiz    Prediabetes 10/11/2022    Tubular adenoma of colon 2021    on colonoscopy    Vitamin D deficiency      Past Surgical History:   Procedure Laterality Date     SECTION N/A     x 2    COLONOSCOPY N/A

## 2025-03-17 ENCOUNTER — OFFICE VISIT (OUTPATIENT)
Dept: FAMILY MEDICINE CLINIC | Age: 75
End: 2025-03-17
Payer: MEDICARE

## 2025-03-17 VITALS
DIASTOLIC BLOOD PRESSURE: 62 MMHG | OXYGEN SATURATION: 98 % | BODY MASS INDEX: 49.53 KG/M2 | WEIGHT: 275.2 LBS | HEART RATE: 94 BPM | TEMPERATURE: 97.7 F | SYSTOLIC BLOOD PRESSURE: 118 MMHG

## 2025-03-17 DIAGNOSIS — S80.02XS TRAUMATIC HEMATOMA OF LEFT KNEE, SEQUELA: Primary | ICD-10-CM

## 2025-03-17 DIAGNOSIS — T14.8XXA WOUND INFECTION: ICD-10-CM

## 2025-03-17 DIAGNOSIS — L08.9 WOUND INFECTION: ICD-10-CM

## 2025-03-17 PROCEDURE — 1159F MED LIST DOCD IN RCRD: CPT | Performed by: PHYSICIAN ASSISTANT

## 2025-03-17 PROCEDURE — 99213 OFFICE O/P EST LOW 20 MIN: CPT | Performed by: PHYSICIAN ASSISTANT

## 2025-03-17 PROCEDURE — 3078F DIAST BP <80 MM HG: CPT | Performed by: PHYSICIAN ASSISTANT

## 2025-03-17 PROCEDURE — 1123F ACP DISCUSS/DSCN MKR DOCD: CPT | Performed by: PHYSICIAN ASSISTANT

## 2025-03-17 PROCEDURE — 3074F SYST BP LT 130 MM HG: CPT | Performed by: PHYSICIAN ASSISTANT

## 2025-03-17 RX ORDER — DOXYCYCLINE HYCLATE 100 MG
100 TABLET ORAL 2 TIMES DAILY WITH MEALS
Qty: 20 TABLET | Refills: 0 | Status: SHIPPED | OUTPATIENT
Start: 2025-03-17 | End: 2025-03-27

## 2025-03-17 RX ORDER — MUPIROCIN 20 MG/G
OINTMENT TOPICAL
COMMUNITY
Start: 2025-03-07

## 2025-03-17 NOTE — PROGRESS NOTES
hematoma of left knee, sequela  Wound infection        -     Continue doxycycline 100 mg BID and topical mupirocin. Continue elevation. Okay to use warm or cold compresses to leg as well for pain relief. Follow-up in 1 week for re-evaluation.     Return in about 1 week (around 3/24/2025).

## 2025-03-21 ENCOUNTER — TELEPHONE (OUTPATIENT)
Dept: NON INVASIVE DIAGNOSTICS | Age: 75
End: 2025-03-21

## 2025-03-24 ENCOUNTER — OFFICE VISIT (OUTPATIENT)
Dept: FAMILY MEDICINE CLINIC | Age: 75
End: 2025-03-24
Payer: MEDICARE

## 2025-03-24 VITALS
WEIGHT: 276.6 LBS | SYSTOLIC BLOOD PRESSURE: 124 MMHG | HEART RATE: 88 BPM | TEMPERATURE: 98 F | OXYGEN SATURATION: 98 % | DIASTOLIC BLOOD PRESSURE: 68 MMHG | BODY MASS INDEX: 48.23 KG/M2

## 2025-03-24 DIAGNOSIS — R60.0 EDEMA OF LEFT LOWER EXTREMITY: Primary | ICD-10-CM

## 2025-03-24 DIAGNOSIS — E55.9 VITAMIN D DEFICIENCY: ICD-10-CM

## 2025-03-24 PROCEDURE — 99213 OFFICE O/P EST LOW 20 MIN: CPT | Performed by: FAMILY MEDICINE

## 2025-03-24 PROCEDURE — 1160F RVW MEDS BY RX/DR IN RCRD: CPT | Performed by: FAMILY MEDICINE

## 2025-03-24 PROCEDURE — 1123F ACP DISCUSS/DSCN MKR DOCD: CPT | Performed by: FAMILY MEDICINE

## 2025-03-24 PROCEDURE — G2211 COMPLEX E/M VISIT ADD ON: HCPCS | Performed by: FAMILY MEDICINE

## 2025-03-24 PROCEDURE — 3074F SYST BP LT 130 MM HG: CPT | Performed by: FAMILY MEDICINE

## 2025-03-24 PROCEDURE — 1159F MED LIST DOCD IN RCRD: CPT | Performed by: FAMILY MEDICINE

## 2025-03-24 PROCEDURE — 3078F DIAST BP <80 MM HG: CPT | Performed by: FAMILY MEDICINE

## 2025-03-24 RX ORDER — CHOLECALCIFEROL (VITAMIN D3) 1250 MCG
1 CAPSULE ORAL WEEKLY
Qty: 12 CAPSULE | Refills: 0 | Status: SHIPPED | OUTPATIENT
Start: 2025-03-24

## 2025-03-24 RX ORDER — BUMETANIDE 1 MG/1
1 TABLET ORAL DAILY
Qty: 10 TABLET | Refills: 0 | Status: SHIPPED | OUTPATIENT
Start: 2025-03-24

## 2025-03-24 NOTE — PROGRESS NOTES
3/24/2025    Maricruz Booth is a 74 y.o. female here for:    Chief Complaint   Patient presents with    Hematoma     Left knee; 1-week wound check        HPI:  Hematoma   - Sustained a fall on ice on 02/24/2025. Developed a very large hematoma (due to being on Eliquis). US on 03/07/2025 was negative for DVT in LLE. Received 1 week of Keflex followed by 3 weeks of doxycycline. This Friday is her last day of antibiotics.  - Patient presents for follow-up today. She is having some posterior calf pain, where she thinks blood pulled due to elevating her left leg. Denies numbness and tingling. Denies warmth and redness. Denies fevers and chills.     Current Outpatient Medications   Medication Sig Dispense Refill    bumetanide (BUMEX) 1 MG tablet Take 1 tablet by mouth daily 10 tablet 0    Cholecalciferol (VITAMIN D3) 1.25 MG (26010 UT) CAPS Take 1 capsule by mouth once a week 12 capsule 0    mupirocin (BACTROBAN) 2 % ointment       doxycycline hyclate (VIBRA-TABS) 100 MG tablet Take 1 tablet by mouth 2 times daily (with meals) for 10 days 20 tablet 0    SYNTHROID 125 MCG tablet TAKE 1 TABLET DAILY 90 tablet 1    dilTIAZem (CARDIZEM CD) 120 MG extended release capsule Take 1 capsule by mouth daily 90 capsule 3    rosuvastatin (CRESTOR) 5 MG tablet TAKE 1 TABLET NIGHTLY 90 tablet 1    vitamin D (ERGOCALCIFEROL) 1.25 MG (87025 UT) CAPS capsule TAKE 1 CAPSULE WEEKLY      (CANCEL D3-50) 12 capsule 1    Efinaconazole 10 % SOLN Apply 8 mLs topically daily 8 mL 3    ELIQUIS 5 MG TABS tablet TAKE 1 TABLET TWICE A  tablet 1    Coenzyme Q10 (CO Q 10 PO) Take by mouth daily      ammonium lactate (LAC-HYDRIN) 12 % lotion Apply to feet qd 225 g 1     No current facility-administered medications for this visit.      No Known Allergies    Past Medical & Surgical History:      Diagnosis Date    A-fib (HCC)     Diverticulosis 06/09/2021    on colonoscopy    Hyperlipidemia     Hypothyroidism     JESSICA (obstructive sleep apnea)

## 2025-03-27 ENCOUNTER — TELEPHONE (OUTPATIENT)
Age: 75
End: 2025-03-27

## 2025-03-27 NOTE — H&P
[facility]  Outpatient Progress Report  PATIENT: Maricruz Booth  MEDICAL RECORD NUMBER: [id number]  DATE OF SERVICE:  3/28/2025  ATTENDING ELECTROPHYSIOLOGIST: Sergio Vyas MD  REFERRING PHYSICIAN: Radha Tomlin DO  CHIEF COMPLAINT: Persistent atrial fibrillation    HPI: This is a 74 y.o. female with a history of   Patient Active Problem List   Diagnosis    Vitamin D deficiency    Mixed hyperlipidemia    Atrial fibrillation (HCC)    JESSICA (obstructive sleep apnea)    Tubular adenoma of colon    Acquired hypothyroidism    Diverticulosis    Morbid obesity with BMI of 45.0-49.9, adult    Essential hypertension    Prediabetes   who presents to cardiac electrophysiology clinic for management of persistent atrial fibrillation. The patient has history of persistent atrial fibrillation, hypertension, hyperlipidemia, hypothyroidism, obesity and mild JESSICA. The patient was diagnosed with atrial fibrillation in January 2019 when she presented with cellulitis and was noted to be in AF. The rhythm converted spontaneously. He was seen by Cardiology and was started on Metoprolol and Eliquis. Her last known NSR was 8/30/22. She was documented being in atrial fibrillation on EKG since 7/8/24. She has echocardiogram on 8/22/24 which showed LV EF 60-65%, mild MR and mild LAE. Nuclear stress test 8/19/24 showed no ischemia.  At her last office visit, in November 2024, the patient complained of fatigue which she related to metoprolol.  Subsequently, her metoprolol was changed to Cardizem help with fatigue symptoms.  Today the patient reports and states on Monday 2/24/2025, she had a mechanical fall on her knee. She denies any syncopal episodes and she has not stopped her Eliquis post fall. The patient denies any chest pain, dyspnea, palpitations, dizziness, syncope, orthopnea or paroxysmal nocturnal dyspnea. She presents today in AF with CVR. Discussed with

## 2025-03-28 ENCOUNTER — HOSPITAL ENCOUNTER (OUTPATIENT)
Age: 75
Discharge: HOME | End: 2025-03-30
Attending: INTERNAL MEDICINE
Payer: MEDICARE

## 2025-03-28 ENCOUNTER — ANESTHESIA EVENT (OUTPATIENT)
Age: 75
End: 2025-03-28
Payer: MEDICARE

## 2025-03-28 ENCOUNTER — ANESTHESIA (OUTPATIENT)
Age: 75
End: 2025-03-28
Payer: MEDICARE

## 2025-03-28 VITALS
HEART RATE: 74 BPM | SYSTOLIC BLOOD PRESSURE: 112 MMHG | RESPIRATION RATE: 22 BRPM | OXYGEN SATURATION: 98 % | DIASTOLIC BLOOD PRESSURE: 88 MMHG

## 2025-03-28 DIAGNOSIS — I48.0 PAROXYSMAL ATRIAL FIBRILLATION (HCC): ICD-10-CM

## 2025-03-28 LAB
ANION GAP SERPL CALCULATED.3IONS-SCNC: 16 MMOL/L (ref 7–16)
BUN SERPL-MCNC: 24 MG/DL (ref 6–23)
CALCIUM SERPL-MCNC: 9.3 MG/DL (ref 8.6–10.2)
CHLORIDE SERPL-SCNC: 102 MMOL/L (ref 98–107)
CO2 SERPL-SCNC: 23 MMOL/L (ref 22–29)
CREAT SERPL-MCNC: 0.8 MG/DL (ref 0.5–1)
EKG ATRIAL RATE: 197 BPM
EKG ATRIAL RATE: 416 BPM
EKG Q-T INTERVAL: 382 MS
EKG Q-T INTERVAL: 408 MS
EKG QRS DURATION: 84 MS
EKG QRS DURATION: 92 MS
EKG QTC CALCULATION (BAZETT): 455 MS
EKG QTC CALCULATION (BAZETT): 467 MS
EKG R AXIS: 46 DEGREES
EKG R AXIS: 47 DEGREES
EKG T AXIS: 33 DEGREES
EKG T AXIS: 43 DEGREES
EKG VENTRICULAR RATE: 75 BPM
EKG VENTRICULAR RATE: 90 BPM
ERYTHROCYTE [DISTWIDTH] IN BLOOD BY AUTOMATED COUNT: 14.2 % (ref 11.5–15)
GFR, ESTIMATED: 79 ML/MIN/1.73M2
GLUCOSE SERPL-MCNC: 98 MG/DL (ref 74–99)
HCT VFR BLD AUTO: 41.9 % (ref 34–48)
HGB BLD-MCNC: 13.5 G/DL (ref 11.5–15.5)
MAGNESIUM SERPL-MCNC: 2.2 MG/DL (ref 1.6–2.6)
MCH RBC QN AUTO: 27.8 PG (ref 26–35)
MCHC RBC AUTO-ENTMCNC: 32.2 G/DL (ref 32–34.5)
MCV RBC AUTO: 86.4 FL (ref 80–99.9)
PLATELET # BLD AUTO: 252 K/UL (ref 130–450)
PMV BLD AUTO: 11.3 FL (ref 7–12)
POTASSIUM SERPL-SCNC: 4.8 MMOL/L (ref 3.5–5)
RBC # BLD AUTO: 4.85 M/UL (ref 3.5–5.5)
SODIUM SERPL-SCNC: 141 MMOL/L (ref 132–146)
WBC OTHER # BLD: 6.6 K/UL (ref 4.5–11.5)

## 2025-03-28 PROCEDURE — 93005 ELECTROCARDIOGRAM TRACING: CPT | Performed by: INTERNAL MEDICINE

## 2025-03-28 PROCEDURE — 2580000003 HC RX 258: Performed by: NURSE ANESTHETIST, CERTIFIED REGISTERED

## 2025-03-28 PROCEDURE — 80048 BASIC METABOLIC PNL TOTAL CA: CPT

## 2025-03-28 PROCEDURE — 83735 ASSAY OF MAGNESIUM: CPT

## 2025-03-28 PROCEDURE — 3700000000 HC ANESTHESIA ATTENDED CARE

## 2025-03-28 PROCEDURE — 92960 CARDIOVERSION ELECTRIC EXT: CPT

## 2025-03-28 PROCEDURE — 92960 CARDIOVERSION ELECTRIC EXT: CPT | Performed by: INTERNAL MEDICINE

## 2025-03-28 PROCEDURE — 3700000001 HC ADD 15 MINUTES (ANESTHESIA)

## 2025-03-28 PROCEDURE — 7100000011 HC PHASE II RECOVERY - ADDTL 15 MIN

## 2025-03-28 PROCEDURE — 85027 COMPLETE CBC AUTOMATED: CPT

## 2025-03-28 PROCEDURE — 7100000010 HC PHASE II RECOVERY - FIRST 15 MIN

## 2025-03-28 PROCEDURE — 6360000002 HC RX W HCPCS: Performed by: NURSE ANESTHETIST, CERTIFIED REGISTERED

## 2025-03-28 RX ORDER — SILVER SULFADIAZINE 10 MG/G
CREAM TOPICAL
Qty: 20 G | Refills: 1 | Status: SHIPPED | OUTPATIENT
Start: 2025-03-28

## 2025-03-28 RX ORDER — SODIUM CHLORIDE 0.9 % (FLUSH) 0.9 %
5-40 SYRINGE (ML) INJECTION PRN
Status: CANCELLED | OUTPATIENT
Start: 2025-03-28

## 2025-03-28 RX ORDER — SODIUM CHLORIDE 0.9 % (FLUSH) 0.9 %
5-40 SYRINGE (ML) INJECTION PRN
Status: DISCONTINUED | OUTPATIENT
Start: 2025-03-28 | End: 2025-03-31 | Stop reason: HOSPADM

## 2025-03-28 RX ORDER — SODIUM CHLORIDE 0.9 % (FLUSH) 0.9 %
5-40 SYRINGE (ML) INJECTION EVERY 12 HOURS SCHEDULED
Status: CANCELLED | OUTPATIENT
Start: 2025-03-28

## 2025-03-28 RX ORDER — PROPOFOL 10 MG/ML
INJECTION, EMULSION INTRAVENOUS
Status: DISCONTINUED | OUTPATIENT
Start: 2025-03-28 | End: 2025-03-28 | Stop reason: SDUPTHER

## 2025-03-28 RX ORDER — SODIUM CHLORIDE 9 MG/ML
INJECTION, SOLUTION INTRAVENOUS PRN
Status: DISCONTINUED | OUTPATIENT
Start: 2025-03-28 | End: 2025-03-31 | Stop reason: HOSPADM

## 2025-03-28 RX ORDER — SODIUM CHLORIDE 9 MG/ML
INJECTION, SOLUTION INTRAVENOUS PRN
Status: CANCELLED | OUTPATIENT
Start: 2025-03-28

## 2025-03-28 RX ORDER — SODIUM CHLORIDE 0.9 % (FLUSH) 0.9 %
5-40 SYRINGE (ML) INJECTION EVERY 12 HOURS SCHEDULED
Status: DISCONTINUED | OUTPATIENT
Start: 2025-03-28 | End: 2025-03-31 | Stop reason: HOSPADM

## 2025-03-28 RX ORDER — SODIUM CHLORIDE 9 MG/ML
INJECTION, SOLUTION INTRAVENOUS
Status: DISCONTINUED | OUTPATIENT
Start: 2025-03-28 | End: 2025-03-28 | Stop reason: SDUPTHER

## 2025-03-28 RX ADMIN — PROPOFOL 60 MG: 10 INJECTION, EMULSION INTRAVENOUS at 10:35

## 2025-03-28 RX ADMIN — PROPOFOL 20 MG: 10 INJECTION, EMULSION INTRAVENOUS at 10:39

## 2025-03-28 RX ADMIN — SODIUM CHLORIDE: 9 INJECTION, SOLUTION INTRAVENOUS at 10:25

## 2025-03-28 ASSESSMENT — LIFESTYLE VARIABLES: SMOKING_STATUS: 0

## 2025-03-28 NOTE — PROGRESS NOTES
At time of discharge the patient was voicing no complaints, and verbalized readiness for discharge.  Patient and family verbalized understanding of AVS and discharge instructions.  Patient was able to dress self, and ambulate without assistance.  Patient provided wheelchair, and taken to 1st floor.  Family member driving her home.

## 2025-03-28 NOTE — ANESTHESIA PRE PROCEDURE
138/91   03/17/25 118/62       NPO Status:  >8 hrs                                                                               BMI:   Wt Readings from Last 3 Encounters:   03/24/25 125.5 kg (276 lb 9.6 oz)   03/19/25 123.8 kg (273 lb)   03/17/25 124.8 kg (275 lb 3.2 oz)     There is no height or weight on file to calculate BMI.    CBC:   Lab Results   Component Value Date/Time    WBC 7.9 01/03/2025 08:14 AM    RBC 5.38 01/03/2025 08:14 AM    HGB 14.9 01/03/2025 08:14 AM    HCT 47.3 01/03/2025 08:14 AM    MCV 87.9 01/03/2025 08:14 AM    RDW 13.9 01/03/2025 08:14 AM     01/03/2025 08:14 AM       CMP:   Lab Results   Component Value Date/Time     01/03/2025 08:14 AM    K 4.6 01/03/2025 08:14 AM    K 4.1 12/10/2020 03:01 AM     01/03/2025 08:14 AM    CO2 22 01/03/2025 08:14 AM    BUN 15 01/03/2025 08:14 AM    CREATININE 0.9 01/03/2025 08:14 AM    GFRAA >60 10/11/2022 08:12 AM    LABGLOM 70 01/03/2025 08:14 AM    LABGLOM >60 01/02/2024 07:52 AM    GLUCOSE 100 01/03/2025 08:14 AM    GLUCOSE 91 06/22/2011 08:55 AM    CALCIUM 9.3 01/03/2025 08:14 AM    BILITOT 0.5 01/03/2025 08:14 AM    ALKPHOS 85 01/03/2025 08:14 AM    AST 21 01/03/2025 08:14 AM    ALT 17 01/03/2025 08:14 AM       POC Tests: No results for input(s): \"POCGLU\", \"POCNA\", \"POCK\", \"POCCL\", \"POCBUN\", \"POCHEMO\", \"POCHCT\" in the last 72 hours.    Coags: No results found for: \"PROTIME\", \"INR\", \"APTT\"    HCG (If Applicable): No results found for: \"PREGTESTUR\", \"PREGSERUM\", \"HCG\", \"HCGQUANT\"     ABGs: No results found for: \"PHART\", \"PO2ART\", \"NLT6DRM\", \"ZCH9FUJ\", \"BEART\", \"O0IUTNUQ\"     Type & Screen (If Applicable):  No results found for: \"ABORH\", \"LABANTI\"    Drug/Infectious Status (If Applicable):  No results found for: \"HIV\", \"HEPCAB\"    COVID-19 Screening (If Applicable): No results found for: \"COVID19\"        Anesthesia Evaluation  Patient summary reviewed and Nursing notes reviewed   no history of anesthetic complications:   Airway:

## 2025-03-28 NOTE — ANESTHESIA POSTPROCEDURE EVALUATION
Department of Anesthesiology  Postprocedure Note    Patient: Maricruz Booth  MRN: [id number]  YOB: 1950  Date of evaluation: 3/28/2025    Procedure Summary       Date: 03/28/25 Room / Location: [facility] Cardiac Cath Lab    Anesthesia Start: 1025 Anesthesia Stop: 1044    Procedure: CARDIOVERSION EXTERNAL Diagnosis: Paroxysmal atrial fibrillation (HCC)    Scheduled Providers: Tj Maradiaga DO Responsible Provider: Tj Maradiaga DO    Anesthesia Type: MAC ASA Status: 3            Anesthesia Type: No value filed.    Munir Phase I:      Munir Phase II:      Anesthesia Post Evaluation    Patient location during evaluation: PACU  Patient participation: complete - patient participated  Level of consciousness: awake  Cardiovascular status: blood pressure returned to baseline  Respiratory status: acceptable  Hydration status: euvolemic  Multimodal analgesia pain management approach  Pain management: adequate        No notable events documented.

## 2025-03-28 NOTE — OP NOTE
[facility] Physicians- [facility] [place] [place] Electrophysiology  Procedure Report  PATIENT: Maricruz Booth  MEDICAL RECORD NUMBER: [id number]  DATE OF PROCEDURE:  3/28/2025  ATTENDING ELECTROPHYSIOLOGIST:  Sergio Vyas MD  REFERRING PHYSICIAN: Dr. Radha Tomlin    PROCECURE:    1. Direct Current Electrical Cardioversion    INDICATION: Persistent atrial fibrillation    PROCEDURE PERFORMED By: Sergio Vyas MD    PROCEDURE TIME: 30 minutes    COMPICATIONS: None immediately apparent    ANESTHESIA: LMAC    DESCRIPTION OF PROCEDURE: The risks, benefits, and alternatives to the procedure were discussed with the patient, and informed consent was obtained. The patient was brought to the cardiovascular lab and sedated under the guidance of anesthesia. Once anesthesia was deemed adequate, a 200 J, a 360 J and a 360 J biphasic synchronous shocks were applied consecutively and all resulted in early recurrent atrial fibrillation. The patient was then allowed to wake in the usual fashion.    Comment: The patient was therapeutically anticoagulated for a minimum of 3 consecutive weeks prior to cardioversion.      SUMMARY:  1. Unsuccessful cardioversion of persistent atrial fibrillation to sinus rhythm due to early recurrent atrial fibrillation.    RECOMMENDATIONS:  1. Discharge to home when fully awake and alert, if clinically stable.  2. Resume all pre-procedure medications, including anticoagulation.  3. The patient to consider rate control treatment versus Tikosyn initiation.  4. Follow-up in the office in 1 week for EKG and in 1 month with provider.    Sergio Vyas MD  Cardiac Electrophysiology  [facility] [place]: [place] Electrophysiology  11:10 AM  3/28/2025

## 2025-03-28 NOTE — DISCHARGE INSTRUCTIONS
[facility]/Electrophysiology  Post DC-Cardioversion Patient Discharge Instructions    1. No driving the day of the procedure.    2. Please resume your medications as instructed.    3. Please call the office at [phone] to schedule a follow-up appointment in 1 week for EKG and in 1 month with provider.

## 2025-03-31 ENCOUNTER — OFFICE VISIT (OUTPATIENT)
Dept: FAMILY MEDICINE CLINIC | Age: 75
End: 2025-03-31
Payer: MEDICARE

## 2025-03-31 VITALS
WEIGHT: 270.4 LBS | OXYGEN SATURATION: 96 % | BODY MASS INDEX: 47.15 KG/M2 | SYSTOLIC BLOOD PRESSURE: 114 MMHG | TEMPERATURE: 98.6 F | DIASTOLIC BLOOD PRESSURE: 62 MMHG | HEART RATE: 90 BPM

## 2025-03-31 DIAGNOSIS — R60.0 EDEMA OF LEFT LOWER EXTREMITY: ICD-10-CM

## 2025-03-31 DIAGNOSIS — S80.02XS TRAUMATIC HEMATOMA OF LEFT KNEE, SEQUELA: Primary | ICD-10-CM

## 2025-03-31 PROCEDURE — 3074F SYST BP LT 130 MM HG: CPT | Performed by: PHYSICIAN ASSISTANT

## 2025-03-31 PROCEDURE — 1159F MED LIST DOCD IN RCRD: CPT | Performed by: PHYSICIAN ASSISTANT

## 2025-03-31 PROCEDURE — 3078F DIAST BP <80 MM HG: CPT | Performed by: PHYSICIAN ASSISTANT

## 2025-03-31 PROCEDURE — 1123F ACP DISCUSS/DSCN MKR DOCD: CPT | Performed by: PHYSICIAN ASSISTANT

## 2025-03-31 PROCEDURE — 99213 OFFICE O/P EST LOW 20 MIN: CPT | Performed by: PHYSICIAN ASSISTANT

## 2025-03-31 NOTE — PROGRESS NOTES
Chief Complaint:  Hematoma (Left leg; 8-day follow-up)    History of Present Illness:  Source of history provided by:  patient.    Hematoma   - Sustained a fall on ice on 2025. Developed a very large hematoma (due to being on Eliquis). US on 2025 was negative for DVT in LLE. Received 1 week of Keflex followed by 3 weeks of doxycycline.   - Was prescribed 10 days of Bumex for leg swelling; has taken intermittently as she had appointments and a  so she did not complete those days; has about 6 days left that she plans to take; swelling has improved    - Cardioversion was not successful; she has appointment with EP this week  - Denies numbness and tingling. Denies new warmth and redness. Denies fevers and chills.     Review of Systems: Unless otherwise stated in this report or unable to obtain because of the patient's clinical or mental status as evidenced by the medical record, this patients's positive and negative responses for Review of Systems, constitutional, psych, eyes, ENT, cardiovascular, respiratory, gastrointestinal, neurological, genitourinary, musculoskeletal, integument systems and systems related to the presenting problem are either stated in the preceding or were not pertinent or were negative for the symptoms and/or complaints related to the medical problem.    Past Medical History:  has a past medical history of A-fib (HCC), Diverticulosis, Hyperlipidemia, Hypothyroidism, JESSICA (obstructive sleep apnea), Prediabetes, Tubular adenoma of colon, and Vitamin D deficiency.  Past Surgical History:  has a past surgical history that includes Hysterectomy, total abdominal;  section (N/A); [place] tooth extraction; and Colonoscopy (N/A, 2021).  Social History:  reports that she has never smoked. She has never used smokeless tobacco. She reports that she does not drink alcohol and does not use drugs.  Family History: family history includes Arthritis in her brother and brother;

## 2025-04-01 DIAGNOSIS — I48.0 PAROXYSMAL ATRIAL FIBRILLATION (HCC): ICD-10-CM

## 2025-04-01 RX ORDER — APIXABAN 5 MG/1
5 TABLET, FILM COATED ORAL 2 TIMES DAILY
Qty: 180 TABLET | Refills: 1 | Status: SHIPPED | OUTPATIENT
Start: 2025-04-01

## 2025-04-01 NOTE — TELEPHONE ENCOUNTER
Name of Medication(s) Requested:  Requested Prescriptions     Pending Prescriptions Disp Refills    ELIQUIS 5 MG TABS tablet [Pharmacy Med Name: ELIQUIS TAB 5MG] 180 tablet 1     Sig: TAKE 1 TABLET TWICE A DAY       Medication is on current medication list Yes    Dosage and directions were verified? Yes    Quantity verified: 90 day supply     Pharmacy Verified?  Yes    Last Appointment:  3/24/2025    Future appts:  Future Appointments   Date Time Provider Department Center   4/4/2025  1:00 PM SCHEDULE, EP LAB/MA ELECTRO PHYS [facility]   4/30/2025  9:00 AM [facility]   5/2/2025  9:30 AM Rylee Shin APRN - CNP ELECTRO PHYS [facility]   6/2/2025  7:30 AM Grabiel Brown DPM Col Podiatry [facility]   7/14/2025  7:30 AM Radha Tomlin, [facility]        (If no appt send self scheduling link. .REFILLAPPT)  Scheduling request sent?     [] Yes  [x] No    Does patient need updated?  [] Yes  [x] No

## 2025-04-02 ENCOUNTER — TELEPHONE (OUTPATIENT)
Dept: NON INVASIVE DIAGNOSTICS | Age: 75
End: 2025-04-02

## 2025-04-02 NOTE — TELEPHONE ENCOUNTER
----- Message from SARI LAY MA sent at 3/28/2025  2:34 PM EDT -----  Regarding: Med cost  Will you check on this next week? Thanks.  ----- Message -----  From: Sergio Vyas MD  Sent: 3/28/2025  10:58 AM EDT  To: Alyssia Brennan, APRN - CNP; Holly Bae MA    Please check cost of Tikosyn and let her know on Friday whether she wants Tikosyn admission or rate control only. Please also give her [facility] pharmacy informations. Thanks.

## 2025-04-02 NOTE — TELEPHONE ENCOUNTER
Called [facility]  Estimated Co-Pay for Dofetilide per the automated system    30 day $ 9.67  90 day $ 30.00

## 2025-04-03 NOTE — TELEPHONE ENCOUNTER
Patient notified of Tikosyn cost and verbalized understanding. She stated she will more than likely defer the Tikosyn admit for a few months.     Electronically signed by Holly Bae MA on 4/3/2025 at 9:52 AM

## 2025-04-04 ENCOUNTER — CLINICAL SUPPORT (OUTPATIENT)
Dept: NON INVASIVE DIAGNOSTICS | Age: 75
End: 2025-04-04
Payer: MEDICARE

## 2025-04-04 VITALS
HEART RATE: 83 BPM | TEMPERATURE: 97.5 F | SYSTOLIC BLOOD PRESSURE: 112 MMHG | DIASTOLIC BLOOD PRESSURE: 72 MMHG | RESPIRATION RATE: 18 BRPM | OXYGEN SATURATION: 95 %

## 2025-04-04 DIAGNOSIS — I48.91 ATRIAL FIBRILLATION, UNSPECIFIED TYPE (HCC): Primary | ICD-10-CM

## 2025-04-04 PROCEDURE — 3078F DIAST BP <80 MM HG: CPT | Performed by: NURSE PRACTITIONER

## 2025-04-04 PROCEDURE — 3074F SYST BP LT 130 MM HG: CPT | Performed by: NURSE PRACTITIONER

## 2025-04-04 PROCEDURE — 93000 ELECTROCARDIOGRAM COMPLETE: CPT | Performed by: INTERNAL MEDICINE

## 2025-04-04 PROCEDURE — 99212 OFFICE O/P EST SF 10 MIN: CPT | Performed by: NURSE PRACTITIONER

## 2025-04-04 PROCEDURE — 1123F ACP DISCUSS/DSCN MKR DOCD: CPT | Performed by: NURSE PRACTITIONER

## 2025-04-04 NOTE — PROGRESS NOTES
Patient continues in atrial flutter with controlled ventricular rate.  Patient did discuss starting Tikosyn, is agreeable for Tikosyn admission.  She would like to wait about a month due to other family obligations and personal issues.  She feels overall good and her controlled rate other than she does get fatigued easily and feels more tired.  I did discuss that the longer she is in atrial fibrillation, the more difficult it would be to treat and get her back into normal rhythm.  She verbalized understanding and will go to Tikosyn admission likely next month.  Message sent to coordinator to set up Tikosyn admission.  I did go over Tikosyn education today and described admission.  I also informed the patient that she would be encouraged to ambulate during her hospital stay, will wear continuous telemetry monitor and have labs daily and EKGs after each dose of medication.  She was informed that she had to be admitted for 6 doses of the medication and likely be cardioverted after 3rd or 4th dose if she remains in atrial flutter or atrial fibrillation.    Alyssia Brennan, APRN - CNP

## 2025-04-11 DIAGNOSIS — I48.91 ATRIAL FIBRILLATION, UNSPECIFIED TYPE (HCC): ICD-10-CM

## 2025-04-11 RX ORDER — DILTIAZEM HYDROCHLORIDE 120 MG/1
120 CAPSULE, COATED, EXTENDED RELEASE ORAL DAILY
Qty: 90 CAPSULE | Refills: 3 | Status: SHIPPED | OUTPATIENT
Start: 2025-04-11

## 2025-04-14 ENCOUNTER — TELEPHONE (OUTPATIENT)
Dept: NON INVASIVE DIAGNOSTICS | Age: 75
End: 2025-04-14

## 2025-04-14 NOTE — TELEPHONE ENCOUNTER
John vann is scheduled on 5/19/2025.     Electronically signed by Holly Bae MA on 4/14/2025 at 2:37 PM

## 2025-04-30 ENCOUNTER — OFFICE VISIT (OUTPATIENT)
Dept: FAMILY MEDICINE CLINIC | Age: 75
End: 2025-04-30

## 2025-04-30 VITALS
HEART RATE: 59 BPM | DIASTOLIC BLOOD PRESSURE: 70 MMHG | RESPIRATION RATE: 20 BRPM | SYSTOLIC BLOOD PRESSURE: 102 MMHG | OXYGEN SATURATION: 98 % | TEMPERATURE: 97.6 F | BODY MASS INDEX: 47.6 KG/M2 | WEIGHT: 273 LBS

## 2025-04-30 DIAGNOSIS — R60.0 EDEMA OF LEFT LOWER EXTREMITY: ICD-10-CM

## 2025-04-30 DIAGNOSIS — T14.8XXA HEMATOMA: Primary | ICD-10-CM

## 2025-04-30 RX ORDER — ACETAMINOPHEN 160 MG
2000 TABLET,DISINTEGRATING ORAL DAILY
COMMUNITY

## 2025-04-30 NOTE — PROGRESS NOTES
2025    Maricruz Booth is a 74 y.o. female here for:    Chief Complaint   Patient presents with    Hematoma     1 month follow up on knee hematoma        HPI:  Hematoma  - Sustained a fall on ice on 2025. Developed a very large hematoma (due to being on Eliquis). US on 2025 was negative for DVT in LLE. Received 1 week of Keflex followed by 3 weeks of doxycycline.   - Was prescribed 10 days of Bumex 1 mg QD for leg swelling. Has taken intermittently as she had appointments and a  so she did not complete those days; has about 6 days left that she plans to take; swelling has improved.  - Patient presents to office today for follow-up. Swelling and bruising of left lower extremity has greatly improved. Patient is noticing some arthritic pain in the left knee joint. States that she notices some \"clicking\" when walking down the stairs. Denies difficulty with range of motion. Denies fevers, chills, and redness. Patient has resumed her swimming twice weekly.     Current Outpatient Medications   Medication Sig Dispense Refill    vitamin D (VITAMIN D3) 50 MCG (2000) CAPS capsule Take 1 capsule by mouth daily      dilTIAZem (CARDIZEM CD) 120 MG extended release capsule Take 1 capsule by mouth daily 90 capsule 3    ELIQUIS 5 MG TABS tablet TAKE 1 TABLET TWICE A  tablet 1    bumetanide (BUMEX) 1 MG tablet Take 1 tablet by mouth daily 10 tablet 0    mupirocin (BACTROBAN) 2 % ointment       SYNTHROID 125 MCG tablet TAKE 1 TABLET DAILY 90 tablet 1    rosuvastatin (CRESTOR) 5 MG tablet TAKE 1 TABLET NIGHTLY 90 tablet 1    Efinaconazole 10 % SOLN Apply 8 mLs topically daily 8 mL 3    Coenzyme Q10 (CO Q 10 PO) Take by mouth daily      ammonium lactate (LAC-HYDRIN) 12 % lotion Apply to feet qd 225 g 1     No current facility-administered medications for this visit.      No Known Allergies    Past Medical & Surgical History:      Diagnosis Date    A-fib (HCC)     Diverticulosis 2021    on

## 2025-04-30 NOTE — PROGRESS NOTES
[facility]  Outpatient Progress Note  Maricruz Booth  1950  Date of Service: 5/2/2025  PCP: Radha Tomlin DO  Electrophysiologist: Dr. Vyas             Subjective: Maricruz Booth is seen for follow-up and management of: persistent atrial fibrillation    Last seen in the office with Dr. Vyas on 2/28/25 and is s/p unsuccessful DCCV due to early recurrent atrial fibrillation    PMH as noted below significant for persistent atrial fibrillation, hypertension, hyperlipidemia, hypothyroidism, obesity and mild JESSICA. The patient was diagnosed with atrial fibrillation in January 2019 when she presented with cellulitis and was noted to be in AF. The rhythm converted spontaneously. He was seen by Cardiology and was started on Metoprolol and Eliquis. Her last known NSR was 8/30/22. She was documented being in atrial fibrillation on EKG since 7/8/24. She has echocardiogram on 8/22/24 which showed LV EF 60-65%, mild MR and mild LAE. Nuclear stress test 8/19/24 showed no ischemia.  At her last office visit, in November 2024, the patient complained of fatigue which she related to metoprolol.  Subsequently, her metoprolol was changed to Cardizem help with fatigue symptoms.  Today the patient reports and states on Monday 2/24/2025, she had a mechanical fall on her knee. She denies any syncopal episodes and she has not stopped her Eliquis post fall. The patient denies any chest pain, dyspnea, palpitations,  syncope, orthopnea or paroxysmal nocturnal dyspnea. She presents today in AF with CVR. Does report fatigue and occasional dizziness.   She underwent a unsuccessful cardioversion on 3/28/25 due to early recurrent atrial fibrillation. Her one week EKG showed she was still in atrial fibrillation.  Here today 1 month s/p DCCV. [facility] She's still agreeable.      Patient Active Problem List

## 2025-05-02 ENCOUNTER — OFFICE VISIT (OUTPATIENT)
Dept: NON INVASIVE DIAGNOSTICS | Age: 75
End: 2025-05-02
Payer: MEDICARE

## 2025-05-02 VITALS
WEIGHT: 274 LBS | HEART RATE: 87 BPM | SYSTOLIC BLOOD PRESSURE: 122 MMHG | BODY MASS INDEX: 48.55 KG/M2 | TEMPERATURE: 97.8 F | DIASTOLIC BLOOD PRESSURE: 72 MMHG | RESPIRATION RATE: 18 BRPM | HEIGHT: 63 IN | OXYGEN SATURATION: 96 %

## 2025-05-02 DIAGNOSIS — I10 ESSENTIAL HYPERTENSION: ICD-10-CM

## 2025-05-02 DIAGNOSIS — E66.01 MORBID OBESITY WITH BMI OF 45.0-49.9, ADULT (HCC): ICD-10-CM

## 2025-05-02 DIAGNOSIS — I48.91 ATRIAL FIBRILLATION, UNSPECIFIED TYPE (HCC): Primary | ICD-10-CM

## 2025-05-02 PROCEDURE — 3078F DIAST BP <80 MM HG: CPT | Performed by: NURSE PRACTITIONER

## 2025-05-02 PROCEDURE — 1123F ACP DISCUSS/DSCN MKR DOCD: CPT | Performed by: NURSE PRACTITIONER

## 2025-05-02 PROCEDURE — 99214 OFFICE O/P EST MOD 30 MIN: CPT | Performed by: NURSE PRACTITIONER

## 2025-05-02 PROCEDURE — 1159F MED LIST DOCD IN RCRD: CPT | Performed by: NURSE PRACTITIONER

## 2025-05-02 PROCEDURE — 1160F RVW MEDS BY RX/DR IN RCRD: CPT | Performed by: NURSE PRACTITIONER

## 2025-05-02 PROCEDURE — 3074F SYST BP LT 130 MM HG: CPT | Performed by: NURSE PRACTITIONER

## 2025-05-02 PROCEDURE — 93000 ELECTROCARDIOGRAM COMPLETE: CPT | Performed by: INTERNAL MEDICINE

## 2025-05-19 ENCOUNTER — HOSPITAL ENCOUNTER (INPATIENT)
Age: 75
LOS: 3 days | Discharge: HOME | DRG: 949 | End: 2025-05-22
Attending: INTERNAL MEDICINE | Admitting: INTERNAL MEDICINE
Payer: MEDICARE

## 2025-05-19 PROBLEM — I48.19 PERSISTENT ATRIAL FIBRILLATION (HCC): Status: ACTIVE | Noted: 2025-05-19

## 2025-05-19 LAB
ALBUMIN SERPL-MCNC: 3.3 G/DL (ref 3.5–5.2)
ALP SERPL-CCNC: 63 U/L (ref 35–104)
ALT SERPL-CCNC: 13 U/L (ref 0–35)
ANION GAP SERPL CALCULATED.3IONS-SCNC: 12 MMOL/L (ref 7–16)
AST SERPL-CCNC: 16 U/L (ref 0–35)
BILIRUB SERPL-MCNC: 0.2 MG/DL (ref 0–1.2)
BUN SERPL-MCNC: 14 MG/DL (ref 8–23)
CALCIUM SERPL-MCNC: 7.3 MG/DL (ref 8.8–10.2)
CHLORIDE SERPL-SCNC: 115 MMOL/L (ref 98–107)
CO2 SERPL-SCNC: 18 MMOL/L (ref 22–29)
CREAT SERPL-MCNC: 0.6 MG/DL (ref 0.5–1)
EKG ATRIAL RATE: 98 BPM
EKG Q-T INTERVAL: 364 MS
EKG QRS DURATION: 84 MS
EKG QTC CALCULATION (BAZETT): 476 MS
EKG R AXIS: 59 DEGREES
EKG T AXIS: 19 DEGREES
EKG VENTRICULAR RATE: 103 BPM
ERYTHROCYTE [DISTWIDTH] IN BLOOD BY AUTOMATED COUNT: 14 % (ref 11.5–15)
GFR, ESTIMATED: >90 ML/MIN/1.73M2
GLUCOSE SERPL-MCNC: 87 MG/DL (ref 74–99)
HCT VFR BLD AUTO: 44 % (ref 34–48)
HGB BLD-MCNC: 13.7 G/DL (ref 11.5–15.5)
MAGNESIUM SERPL-MCNC: 1.5 MG/DL (ref 1.6–2.4)
MCH RBC QN AUTO: 26.9 PG (ref 26–35)
MCHC RBC AUTO-ENTMCNC: 31.1 G/DL (ref 32–34.5)
MCV RBC AUTO: 86.4 FL (ref 80–99.9)
PLATELET # BLD AUTO: 221 K/UL (ref 130–450)
PMV BLD AUTO: 11.8 FL (ref 7–12)
POTASSIUM SERPL-SCNC: 3.3 MMOL/L (ref 3.5–5.1)
PROT SERPL-MCNC: 5.7 G/DL (ref 6.4–8.3)
RBC # BLD AUTO: 5.09 M/UL (ref 3.5–5.5)
SODIUM SERPL-SCNC: 145 MMOL/L (ref 136–145)
WBC OTHER # BLD: 6.7 K/UL (ref 4.5–11.5)

## 2025-05-19 PROCEDURE — 6370000000 HC RX 637 (ALT 250 FOR IP): Performed by: INTERNAL MEDICINE

## 2025-05-19 PROCEDURE — 85027 COMPLETE CBC AUTOMATED: CPT

## 2025-05-19 PROCEDURE — 6370000000 HC RX 637 (ALT 250 FOR IP): Performed by: NURSE PRACTITIONER

## 2025-05-19 PROCEDURE — 93005 ELECTROCARDIOGRAM TRACING: CPT | Performed by: NURSE PRACTITIONER

## 2025-05-19 PROCEDURE — 99223 1ST HOSP IP/OBS HIGH 75: CPT | Performed by: INTERNAL MEDICINE

## 2025-05-19 PROCEDURE — 80053 COMPREHEN METABOLIC PANEL: CPT

## 2025-05-19 PROCEDURE — 93010 ELECTROCARDIOGRAM REPORT: CPT | Performed by: INTERNAL MEDICINE

## 2025-05-19 PROCEDURE — 2140000000 HC CCU INTERMEDIATE R&B

## 2025-05-19 PROCEDURE — 93005 ELECTROCARDIOGRAM TRACING: CPT | Performed by: INTERNAL MEDICINE

## 2025-05-19 PROCEDURE — 6360000002 HC RX W HCPCS: Performed by: NURSE PRACTITIONER

## 2025-05-19 PROCEDURE — 83735 ASSAY OF MAGNESIUM: CPT

## 2025-05-19 RX ORDER — ROSUVASTATIN CALCIUM 5 MG/1
5 TABLET, COATED ORAL NIGHTLY
Status: DISCONTINUED | OUTPATIENT
Start: 2025-05-19 | End: 2025-05-22 | Stop reason: HOSPADM

## 2025-05-19 RX ORDER — MAGNESIUM SULFATE IN WATER 40 MG/ML
2000 INJECTION, SOLUTION INTRAVENOUS ONCE
Status: COMPLETED | OUTPATIENT
Start: 2025-05-19 | End: 2025-05-19

## 2025-05-19 RX ORDER — CHOLECALCIFEROL (VITAMIN D3) 50 MCG
2000 TABLET ORAL DAILY
Status: DISCONTINUED | OUTPATIENT
Start: 2025-05-19 | End: 2025-05-22 | Stop reason: HOSPADM

## 2025-05-19 RX ORDER — DOFETILIDE 0.25 MG/1
250 CAPSULE ORAL EVERY 12 HOURS SCHEDULED
Status: DISCONTINUED | OUTPATIENT
Start: 2025-05-19 | End: 2025-05-22 | Stop reason: HOSPADM

## 2025-05-19 RX ORDER — POTASSIUM CHLORIDE 1500 MG/1
20 TABLET, EXTENDED RELEASE ORAL 2 TIMES DAILY WITH MEALS
Status: COMPLETED | OUTPATIENT
Start: 2025-05-19 | End: 2025-05-19

## 2025-05-19 RX ORDER — DILTIAZEM HYDROCHLORIDE 120 MG/1
120 CAPSULE, COATED, EXTENDED RELEASE ORAL NIGHTLY
Status: DISCONTINUED | OUTPATIENT
Start: 2025-05-19 | End: 2025-05-22 | Stop reason: HOSPADM

## 2025-05-19 RX ADMIN — POTASSIUM CHLORIDE 20 MEQ: 1500 TABLET, EXTENDED RELEASE ORAL at 11:30

## 2025-05-19 RX ADMIN — DOFETILIDE 250 MCG: 0.25 CAPSULE ORAL at 18:04

## 2025-05-19 RX ADMIN — Medication 2000 UNITS: at 20:41

## 2025-05-19 RX ADMIN — DILTIAZEM HYDROCHLORIDE 120 MG: 120 CAPSULE, EXTENDED RELEASE ORAL at 20:41

## 2025-05-19 RX ADMIN — ROSUVASTATIN CALCIUM 5 MG: 5 TABLET, FILM COATED ORAL at 20:40

## 2025-05-19 RX ADMIN — POTASSIUM CHLORIDE 20 MEQ: 1500 TABLET, EXTENDED RELEASE ORAL at 17:12

## 2025-05-19 RX ADMIN — MAGNESIUM SULFATE HEPTAHYDRATE 2000 MG: 40 INJECTION, SOLUTION INTRAVENOUS at 11:31

## 2025-05-19 RX ADMIN — APIXABAN 5 MG: 5 TABLET, FILM COATED ORAL at 20:41

## 2025-05-19 NOTE — H&P
[facility]- [facility]  History and Physical Examination  Maricruz Booth  1950  Date of Service: 5/19/2025  PCP: Radha Tomlin DO  Electrophysiologist: Sergio Vyas MD            Subjective: Maricruz Booth is seen in the hospital for Tikosyn initiation for treatment of persistent atrial fibrillation. The patient has history of  persistent atrial fibrillation, hypertension, hyperlipidemia, hypothyroidism, obesity and mild JESSICA. She was diagnosed with atrial fibrillation in January 2019 when she presented with cellulitis and was noted to be in AF. The rhythm converted spontaneously. He was seen by Cardiology and was started on Metoprolol and Eliquis. Her last known NSR was 8/30/22. She was documented being in atrial fibrillation on EKG since 7/8/24. She has echocardiogram on 8/22/24 which showed LV EF 60-65%, mild MR and mild LAE. Nuclear stress test 8/19/24 showed no ischemia.  At her office visit, in November 2024, the patient complained of fatigue which she related to metoprolol.  Subsequently, her metoprolol was changed to Cardizem help with fatigue symptoms. She underwent a unsuccessful cardioversion on 3/28/25 due to early recurrent atrial fibrillation. She is scheduled for Tikosyn admission today, 5/19/25.    Patient Active Problem List    Diagnosis Date Noted    Persistent atrial fibrillation (HCC) 05/19/2025    Morbid obesity with BMI of 45.0-49.9, adult (HCC) 07/08/2024    Essential hypertension 07/08/2024    Prediabetes 10/11/2022    Acquired hypothyroidism     Diverticulosis 06/09/2021     Overview Note:     on colonoscopy      Tubular adenoma of colon     JESSICA (obstructive sleep apnea)      Overview Note:     On CPAP, wears nightly       Atrial fibrillation (HCC) 02/13/2019    Mixed hyperlipidemia 11/29/2018    Vitamin D deficiency 03/28/2018       Past Medical History:   Diagnosis Date    A-fib (HCC)     Diverticulosis 06/09/2021

## 2025-05-19 NOTE — ACP (ADVANCE CARE PLANNING)
Advance Care Planning   Healthcare Decision Maker:    Primary Decision Maker: Rajan Booth - Spouse - [phone]    Secondary Decision Maker: Sandra Blackburn - Child - [phone]    Click here to complete Healthcare Decision Makers including selection of the Healthcare Decision Maker Relationship (ie \"Primary\").

## 2025-05-19 NOTE — PROGRESS NOTES
4 Eyes Skin Assessment     NAME:  Maricruz Booth  YOB: 1950  MEDICAL RECORD NUMBER:  [id number]    The patient is being assessed for  Admission    I agree that at least one RN has performed a thorough Head to Toe Skin Assessment on the patient. ALL assessment sites listed below have been assessed.      Areas assessed by both nurses:    Head, Face, Ears, Shoulders, Back, Chest, Arms, Elbows, Hands, Sacrum. Buttock, Coccyx, Ischium, Legs. Feet and Heels, and Under Medical Devices         Does the Patient have a Wound? No noted wound(s)       Richard Prevention initiated by RN: No  Wound Care Orders initiated by RN: No    Pressure Injury (Stage 3,4, Unstageable, DTI, NWPT, and Complex wounds) if present, place Wound referral order by RN under : No    New Ostomies, if present place, Ostomy referral order under : No     Nurse 1 eSignature: Electronically signed by Kristen Garner RN on 5/19/25 at 3:06 PM EDT    **SHARE this note so that the co-signing nurse can place an eSignature**    Nurse 2 eSignature: {Esignature:[id number]}

## 2025-05-19 NOTE — PLAN OF CARE
Problem: Discharge Planning  Goal: Discharge to home or other facility with appropriate resources  Outcome: Progressing  Flowsheets (Taken 5/19/2025 1032 by Kristen Garner RN)  Discharge to home or other facility with appropriate resources: Identify barriers to discharge with patient and caregiver

## 2025-05-20 LAB
ANION GAP SERPL CALCULATED.3IONS-SCNC: 9 MMOL/L (ref 7–16)
BUN SERPL-MCNC: 16 MG/DL (ref 8–23)
CALCIUM SERPL-MCNC: 8.8 MG/DL (ref 8.8–10.2)
CHLORIDE SERPL-SCNC: 108 MMOL/L (ref 98–107)
CO2 SERPL-SCNC: 22 MMOL/L (ref 22–29)
CREAT SERPL-MCNC: 0.9 MG/DL (ref 0.5–1)
EKG ATRIAL RATE: 267 BPM
EKG ATRIAL RATE: 300 BPM
EKG ATRIAL RATE: 69 BPM
EKG P AXIS: 35 DEGREES
EKG P-R INTERVAL: 190 MS
EKG Q-T INTERVAL: 414 MS
EKG Q-T INTERVAL: 418 MS
EKG Q-T INTERVAL: 432 MS
EKG QRS DURATION: 86 MS
EKG QRS DURATION: 86 MS
EKG QRS DURATION: 88 MS
EKG QTC CALCULATION (BAZETT): 438 MS
EKG QTC CALCULATION (BAZETT): 462 MS
EKG QTC CALCULATION (BAZETT): 483 MS
EKG R AXIS: 46 DEGREES
EKG R AXIS: 53 DEGREES
EKG R AXIS: 58 DEGREES
EKG T AXIS: 26 DEGREES
EKG T AXIS: 27 DEGREES
EKG T AXIS: 33 DEGREES
EKG VENTRICULAR RATE: 66 BPM
EKG VENTRICULAR RATE: 69 BPM
EKG VENTRICULAR RATE: 82 BPM
GFR, ESTIMATED: 72 ML/MIN/1.73M2
GLUCOSE SERPL-MCNC: 103 MG/DL (ref 74–99)
MAGNESIUM SERPL-MCNC: 2.2 MG/DL (ref 1.6–2.4)
POTASSIUM SERPL-SCNC: 5 MMOL/L (ref 3.5–5.1)
SODIUM SERPL-SCNC: 140 MMOL/L (ref 136–145)

## 2025-05-20 PROCEDURE — 6370000000 HC RX 637 (ALT 250 FOR IP): Performed by: INTERNAL MEDICINE

## 2025-05-20 PROCEDURE — 2140000000 HC CCU INTERMEDIATE R&B

## 2025-05-20 PROCEDURE — 6370000000 HC RX 637 (ALT 250 FOR IP): Performed by: NURSE PRACTITIONER

## 2025-05-20 PROCEDURE — 93010 ELECTROCARDIOGRAM REPORT: CPT | Performed by: INTERNAL MEDICINE

## 2025-05-20 PROCEDURE — 99233 SBSQ HOSP IP/OBS HIGH 50: CPT | Performed by: INTERNAL MEDICINE

## 2025-05-20 PROCEDURE — 93005 ELECTROCARDIOGRAM TRACING: CPT | Performed by: NURSE PRACTITIONER

## 2025-05-20 PROCEDURE — 83735 ASSAY OF MAGNESIUM: CPT

## 2025-05-20 PROCEDURE — 80048 BASIC METABOLIC PNL TOTAL CA: CPT

## 2025-05-20 PROCEDURE — 93005 ELECTROCARDIOGRAM TRACING: CPT | Performed by: INTERNAL MEDICINE

## 2025-05-20 PROCEDURE — 36415 COLL VENOUS BLD VENIPUNCTURE: CPT

## 2025-05-20 RX ORDER — ACETAMINOPHEN 325 MG/1
650 TABLET ORAL EVERY 4 HOURS PRN
Status: DISCONTINUED | OUTPATIENT
Start: 2025-05-20 | End: 2025-05-22 | Stop reason: HOSPADM

## 2025-05-20 RX ORDER — ACETAMINOPHEN 650 MG/1
650 SUPPOSITORY RECTAL EVERY 4 HOURS PRN
Status: DISCONTINUED | OUTPATIENT
Start: 2025-05-20 | End: 2025-05-22 | Stop reason: HOSPADM

## 2025-05-20 RX ADMIN — APIXABAN 5 MG: 5 TABLET, FILM COATED ORAL at 08:42

## 2025-05-20 RX ADMIN — ROSUVASTATIN CALCIUM 5 MG: 5 TABLET, FILM COATED ORAL at 20:07

## 2025-05-20 RX ADMIN — LEVOTHYROXINE SODIUM 125 MCG: 0.1 TABLET ORAL at 08:42

## 2025-05-20 RX ADMIN — ACETAMINOPHEN 650 MG: 325 TABLET ORAL at 20:08

## 2025-05-20 RX ADMIN — Medication 2000 UNITS: at 20:10

## 2025-05-20 RX ADMIN — DOFETILIDE 250 MCG: 0.25 CAPSULE ORAL at 05:58

## 2025-05-20 RX ADMIN — APIXABAN 5 MG: 5 TABLET, FILM COATED ORAL at 20:07

## 2025-05-20 RX ADMIN — DOFETILIDE 250 MCG: 0.25 CAPSULE ORAL at 17:59

## 2025-05-20 RX ADMIN — DILTIAZEM HYDROCHLORIDE 120 MG: 120 CAPSULE, EXTENDED RELEASE ORAL at 20:07

## 2025-05-20 ASSESSMENT — PAIN DESCRIPTION - ONSET: ONSET: GRADUAL

## 2025-05-20 ASSESSMENT — PAIN DESCRIPTION - DESCRIPTORS: DESCRIPTORS: SORE;ACHING;DISCOMFORT

## 2025-05-20 ASSESSMENT — PAIN - FUNCTIONAL ASSESSMENT: PAIN_FUNCTIONAL_ASSESSMENT: ACTIVITIES ARE NOT PREVENTED

## 2025-05-20 ASSESSMENT — PAIN DESCRIPTION - ORIENTATION: ORIENTATION: LEFT

## 2025-05-20 ASSESSMENT — PAIN SCALES - GENERAL
PAINLEVEL_OUTOF10: 0
PAINLEVEL_OUTOF10: 4

## 2025-05-20 ASSESSMENT — PAIN DESCRIPTION - PAIN TYPE: TYPE: CHRONIC PAIN

## 2025-05-20 ASSESSMENT — PAIN DESCRIPTION - LOCATION: LOCATION: KNEE

## 2025-05-20 ASSESSMENT — PAIN DESCRIPTION - FREQUENCY: FREQUENCY: INTERMITTENT

## 2025-05-20 NOTE — PROGRESS NOTES
[facility] Electrophysiology  Inpatient Progress Report  PATIENT: Maricruz Booth  MEDICAL RECORD NUMBER: [id number]  DATE OF SERVICE:  5/20/2025  ATTENDING ELECTROPHYSIOLOGIST: Sergio Vysa MD   PRIMARY ELECTROPHYSIOLOGIST: Sergio Vyas MD   REFERRING PHYSICIAN:  Radha Tomlin DO  CHIEF COMPLAINT: Atrial fibrillation.     HPI: This is a 74 y.o. female with a history of  persistent atrial fibrillation, hypertension, hyperlipidemia, hypothyroidism, obesity and mild JESSICA. She was diagnosed with atrial fibrillation in January 2019 when she presented with cellulitis and was noted to be in AF. The rhythm converted spontaneously. He was seen by Cardiology and was started on Metoprolol and Eliquis. Her last known NSR was 8/30/22. She was documented being in atrial fibrillation on EKG since 7/8/24. She has echocardiogram on 8/22/24 which showed LV EF 60-65%, mild MR and mild LAE. Nuclear stress test 8/19/24 showed no ischemia.  At her office visit, in November 2024, the patient complained of fatigue which she related to metoprolol.  Subsequently, her metoprolol was changed to Cardizem help with fatigue symptoms. She underwent a unsuccessful cardioversion on 3/28/25 due to early recurrent atrial fibrillation. She is scheduled for Tikosyn admission, 5/19/25.     05/20/2025: The patient has completed 2/6 doses of Tikosyn without QTc prolongation with spontaneously conversion to sinus. She has no cardiac complaints and yet does not know if she feels better in sinus. QTc today 462ms.           Patient Active Problem List    Diagnosis Date Noted    Persistent atrial fibrillation (HCC) 05/19/2025    Morbid obesity with BMI of 45.0-49.9, adult (HCC) 07/08/2024    Essential hypertension 07/08/2024    Prediabetes 10/11/2022    Acquired hypothyroidism     Diverticulosis 06/09/2021     Overview Note:     on colonoscopy      Tubular adenoma of colon     JESSICA  Yes

## 2025-05-21 LAB
ANION GAP SERPL CALCULATED.3IONS-SCNC: 11 MMOL/L (ref 7–16)
BUN SERPL-MCNC: 16 MG/DL (ref 8–23)
CALCIUM SERPL-MCNC: 9 MG/DL (ref 8.8–10.2)
CHLORIDE SERPL-SCNC: 106 MMOL/L (ref 98–107)
CO2 SERPL-SCNC: 22 MMOL/L (ref 22–29)
CREAT SERPL-MCNC: 0.8 MG/DL (ref 0.5–1)
GFR, ESTIMATED: 80 ML/MIN/1.73M2
GLUCOSE SERPL-MCNC: 96 MG/DL (ref 74–99)
MAGNESIUM SERPL-MCNC: 2 MG/DL (ref 1.6–2.4)
POTASSIUM SERPL-SCNC: 4.8 MMOL/L (ref 3.5–5.1)
SODIUM SERPL-SCNC: 138 MMOL/L (ref 136–145)

## 2025-05-21 PROCEDURE — 6370000000 HC RX 637 (ALT 250 FOR IP): Performed by: NURSE PRACTITIONER

## 2025-05-21 PROCEDURE — 93005 ELECTROCARDIOGRAM TRACING: CPT | Performed by: INTERNAL MEDICINE

## 2025-05-21 PROCEDURE — 2140000000 HC CCU INTERMEDIATE R&B

## 2025-05-21 PROCEDURE — 6370000000 HC RX 637 (ALT 250 FOR IP): Performed by: INTERNAL MEDICINE

## 2025-05-21 PROCEDURE — 83735 ASSAY OF MAGNESIUM: CPT

## 2025-05-21 PROCEDURE — 99233 SBSQ HOSP IP/OBS HIGH 50: CPT | Performed by: INTERNAL MEDICINE

## 2025-05-21 PROCEDURE — 80048 BASIC METABOLIC PNL TOTAL CA: CPT

## 2025-05-21 PROCEDURE — 36415 COLL VENOUS BLD VENIPUNCTURE: CPT

## 2025-05-21 RX ADMIN — DOFETILIDE 250 MCG: 0.25 CAPSULE ORAL at 18:05

## 2025-05-21 RX ADMIN — LEVOTHYROXINE SODIUM 125 MCG: 0.1 TABLET ORAL at 08:14

## 2025-05-21 RX ADMIN — Medication 2000 UNITS: at 08:14

## 2025-05-21 RX ADMIN — ACETAMINOPHEN 650 MG: 325 TABLET ORAL at 08:16

## 2025-05-21 RX ADMIN — APIXABAN 5 MG: 5 TABLET, FILM COATED ORAL at 08:14

## 2025-05-21 RX ADMIN — DILTIAZEM HYDROCHLORIDE 120 MG: 120 CAPSULE, EXTENDED RELEASE ORAL at 20:04

## 2025-05-21 RX ADMIN — ROSUVASTATIN CALCIUM 5 MG: 5 TABLET, FILM COATED ORAL at 20:04

## 2025-05-21 RX ADMIN — APIXABAN 5 MG: 5 TABLET, FILM COATED ORAL at 20:04

## 2025-05-21 RX ADMIN — DOFETILIDE 250 MCG: 0.25 CAPSULE ORAL at 06:00

## 2025-05-21 ASSESSMENT — PAIN DESCRIPTION - LOCATION: LOCATION: KNEE

## 2025-05-21 ASSESSMENT — PAIN SCALES - GENERAL: PAINLEVEL_OUTOF10: 0

## 2025-05-21 ASSESSMENT — PAIN DESCRIPTION - ORIENTATION: ORIENTATION: LEFT

## 2025-05-21 ASSESSMENT — PAIN - FUNCTIONAL ASSESSMENT: PAIN_FUNCTIONAL_ASSESSMENT: ACTIVITIES ARE NOT PREVENTED

## 2025-05-21 ASSESSMENT — PAIN DESCRIPTION - DESCRIPTORS: DESCRIPTORS: ACHING;DULL

## 2025-05-21 NOTE — PLAN OF CARE
Problem: Discharge Planning  Goal: Discharge to home or other facility with appropriate resources  Outcome: Progressing  Flowsheets (Taken 5/20/2025 2000)  Discharge to home or other facility with appropriate resources: Identify barriers to discharge with patient and caregiver     Problem: ABCDS Injury Assessment  Goal: Absence of physical injury  Outcome: Progressing     Problem: Pain  Goal: Verbalizes/displays adequate comfort level or baseline comfort level  Outcome: Progressing

## 2025-05-21 NOTE — PATIENT CARE CONFERENCE
P Quality Flow/Interdisciplinary Rounds Progress Note        Quality Flow Rounds held on May 21, 2025    Disciplines Attending:  Bedside Nurse, , , and Nursing Unit Leadership    Maricruz Booth was admitted on 5/19/2025  7:38 AM    Anticipated Discharge Date:       Disposition:    Richard Score:  Richard Scale Score: 22    [facility] RISK OF UNPLANNED READMISSION 2.0             6.6 Total Score        Discussed patient goal for the day, patient clinical progression, and barriers to discharge.  The following Goal(s) of the Day/Commitment(s) have been identified:  Report labs/diagnostics      Raven Mancia RN  May 21, 2025

## 2025-05-21 NOTE — PROGRESS NOTES
[facility]- [facility]  History and Physical Examination  Maricruz Booth  1950  Date of Service: 5/21/2025  PCP: Radha Tomlin DO  Electrophysiologist: Sergio Vyas MD        Subjective: Maricruz Booth is seen in the hospital for Tikosyn initiation for treatment of persistent atrial fibrillation. The patient has history of  persistent atrial fibrillation, hypertension, hyperlipidemia, hypothyroidism, obesity and mild JESSICA. She was diagnosed with atrial fibrillation in January 2019 when she presented with cellulitis and was noted to be in AF. The rhythm converted spontaneously. He was seen by Cardiology and was started on Metoprolol and Eliquis. Her last known NSR was 8/30/22. She was documented being in atrial fibrillation on EKG since 7/8/24. She has echocardiogram on 8/22/24 which showed LV EF 60-65%, mild MR and mild LAE. Nuclear stress test 8/19/24 showed no ischemia.  At her office visit, in November 2024, the patient complained of fatigue which she related to metoprolol.  Subsequently, her metoprolol was changed to Cardizem help with fatigue symptoms. She underwent a unsuccessful cardioversion on 3/28/25 due to early recurrent atrial fibrillation. She is scheduled for Tikosyn admission today, 5/19/25.    05/20/2025: The patient has completed 2/6 doses of Tikosyn without QTc prolongation with spontaneously conversion to sinus. She has no cardiac complaints and yet does not know if she feels better in sinus. QTc today 462ms.     5/21/2025: Patient lying in bed with no complaints.  She maintained sinus rhythm this morning and diet is resumed.      Patient Active Problem List    Diagnosis Date Noted    Persistent atrial fibrillation (HCC) 05/19/2025    Morbid obesity with BMI of 45.0-49.9, adult (HCC) 07/08/2024    Essential hypertension 07/08/2024    Prediabetes 10/11/2022    Acquired hypothyroidism     Diverticulosis 06/09/2021

## 2025-05-21 NOTE — PLAN OF CARE
Problem: Discharge Planning  Goal: Discharge to home or other facility with appropriate resources  5/21/2025 1957 by Maryjo Genao, RN  Outcome: Progressing     Problem: ABCDS Injury Assessment  Goal: Absence of physical injury  5/21/2025 1957 by Maryjo Genao, RN  Outcome: Progressing

## 2025-05-21 NOTE — PLAN OF CARE
Problem: Discharge Planning  Goal: Discharge to home or other facility with appropriate resources  5/21/2025 0955 by WOODROW Winkler RN  Outcome: Progressing  Flowsheets (Taken 5/21/2025 0800)  Discharge to home or other facility with appropriate resources: Identify barriers to discharge with patient and caregiver  5/20/2025 2243 by Jessie Izquierdo RN  Outcome: Progressing  Flowsheets (Taken 5/20/2025 2000)  Discharge to home or other facility with appropriate resources: Identify barriers to discharge with patient and caregiver     Problem: ABCDS Injury Assessment  Goal: Absence of physical injury  5/21/2025 0955 by WOODROW Winkler RN  Outcome: Progressing  5/20/2025 2243 by Jessie Izquierdo RN  Outcome: Progressing     Problem: Pain  Goal: Verbalizes/displays adequate comfort level or baseline comfort level  5/21/2025 0955 by WOODROW Winkler RN  Outcome: Progressing  Flowsheets  Taken 5/21/2025 0300 by Jessie Izquierdo RN  Verbalizes/displays adequate comfort level or baseline comfort level: Encourage patient to monitor pain and request assistance  Taken 5/20/2025 2305 by Jessie Izquierdo RN  Verbalizes/displays adequate comfort level or baseline comfort level: Encourage patient to monitor pain and request assistance  5/20/2025 2243 by Jessie Izquierdo, RN  Outcome: Progressing

## 2025-05-22 VITALS
RESPIRATION RATE: 20 BRPM | DIASTOLIC BLOOD PRESSURE: 65 MMHG | TEMPERATURE: 97 F | BODY MASS INDEX: 47.37 KG/M2 | SYSTOLIC BLOOD PRESSURE: 115 MMHG | OXYGEN SATURATION: 97 % | HEART RATE: 97 BPM | WEIGHT: 277.5 LBS | HEIGHT: 64 IN

## 2025-05-22 PROBLEM — Z79.899 VISIT FOR MONITORING TIKOSYN THERAPY: Status: ACTIVE | Noted: 2025-05-22

## 2025-05-22 PROBLEM — Z51.81 VISIT FOR MONITORING TIKOSYN THERAPY: Status: ACTIVE | Noted: 2025-05-22

## 2025-05-22 LAB
ANION GAP SERPL CALCULATED.3IONS-SCNC: 11 MMOL/L (ref 7–16)
BUN SERPL-MCNC: 17 MG/DL (ref 8–23)
CALCIUM SERPL-MCNC: 9.1 MG/DL (ref 8.8–10.2)
CHLORIDE SERPL-SCNC: 105 MMOL/L (ref 98–107)
CO2 SERPL-SCNC: 24 MMOL/L (ref 22–29)
CREAT SERPL-MCNC: 0.9 MG/DL (ref 0.5–1)
EKG ATRIAL RATE: 61 BPM
EKG ATRIAL RATE: 71 BPM
EKG ATRIAL RATE: 72 BPM
EKG P AXIS: 48 DEGREES
EKG P AXIS: 52 DEGREES
EKG P-R INTERVAL: 198 MS
EKG P-R INTERVAL: 210 MS
EKG Q-T INTERVAL: 418 MS
EKG Q-T INTERVAL: 440 MS
EKG Q-T INTERVAL: 478 MS
EKG QRS DURATION: 84 MS
EKG QRS DURATION: 88 MS
EKG QRS DURATION: 92 MS
EKG QTC CALCULATION (BAZETT): 478 MS
EKG QTC CALCULATION (BAZETT): 481 MS
EKG QTC CALCULATION (BAZETT): 508 MS
EKG R AXIS: 25 DEGREES
EKG R AXIS: 29 DEGREES
EKG R AXIS: 32 DEGREES
EKG T AXIS: 14 DEGREES
EKG T AXIS: 29 DEGREES
EKG T AXIS: 32 DEGREES
EKG VENTRICULAR RATE: 61 BPM
EKG VENTRICULAR RATE: 71 BPM
EKG VENTRICULAR RATE: 89 BPM
GFR, ESTIMATED: 72 ML/MIN/1.73M2
GLUCOSE SERPL-MCNC: 90 MG/DL (ref 74–99)
MAGNESIUM SERPL-MCNC: 2.1 MG/DL (ref 1.6–2.4)
POTASSIUM SERPL-SCNC: 4.6 MMOL/L (ref 3.5–5.1)
SODIUM SERPL-SCNC: 140 MMOL/L (ref 136–145)

## 2025-05-22 PROCEDURE — 36415 COLL VENOUS BLD VENIPUNCTURE: CPT

## 2025-05-22 PROCEDURE — 6370000000 HC RX 637 (ALT 250 FOR IP): Performed by: INTERNAL MEDICINE

## 2025-05-22 PROCEDURE — 83735 ASSAY OF MAGNESIUM: CPT

## 2025-05-22 PROCEDURE — 6370000000 HC RX 637 (ALT 250 FOR IP): Performed by: NURSE PRACTITIONER

## 2025-05-22 PROCEDURE — 99239 HOSP IP/OBS DSCHRG MGMT >30: CPT | Performed by: INTERNAL MEDICINE

## 2025-05-22 PROCEDURE — 93010 ELECTROCARDIOGRAM REPORT: CPT | Performed by: INTERNAL MEDICINE

## 2025-05-22 PROCEDURE — 80048 BASIC METABOLIC PNL TOTAL CA: CPT

## 2025-05-22 RX ORDER — DOFETILIDE 0.25 MG/1
250 CAPSULE ORAL EVERY 12 HOURS SCHEDULED
Qty: 60 CAPSULE | Refills: 5 | Status: SHIPPED | OUTPATIENT
Start: 2025-05-22

## 2025-05-22 RX ADMIN — LEVOTHYROXINE SODIUM 125 MCG: 0.1 TABLET ORAL at 07:38

## 2025-05-22 RX ADMIN — DOFETILIDE 250 MCG: 0.25 CAPSULE ORAL at 05:57

## 2025-05-22 RX ADMIN — APIXABAN 5 MG: 5 TABLET, FILM COATED ORAL at 07:38

## 2025-05-22 NOTE — PLAN OF CARE
Problem: Pain  Goal: Verbalizes/displays adequate comfort level or baseline comfort level  5/22/2025 1041 by Raven Mancia RN  Outcome: Completed  5/22/2025 1017 by WOODROW Winkler RN  Outcome: Progressing     Problem: ABCDS Injury Assessment  Goal: Absence of physical injury  5/22/2025 1041 by Raven Mancia RN  Outcome: Completed  5/22/2025 1017 by WOODROW Winkler RN  Outcome: Progressing     Problem: Discharge Planning  Goal: Discharge to home or other facility with appropriate resources  5/22/2025 1041 by Raven Mancia RN  Outcome: Completed  5/22/2025 1017 by WOODROW Winkler RN  Outcome: Progressing

## 2025-05-22 NOTE — PATIENT CARE CONFERENCE
P Quality Flow/Interdisciplinary Rounds Progress Note        Quality Flow Rounds held on May 22, 2025    Disciplines Attending:  Bedside Nurse, , , and Nursing Unit Leadership    Maricruz Booth was admitted on 5/19/2025  7:38 AM    Anticipated Discharge Date:       Disposition:    Richard Score:  Richard Scale Score: 22    BS RISK OF UNPLANNED READMISSION 2.0             6.5 Total Score        Discussed patient goal for the day, patient clinical progression, and barriers to discharge.  The following Goal(s) of the Day/Commitment(s) have been identified:  report labs/diagnostics       Raven Mancia RN  May 22, 2025

## 2025-05-22 NOTE — PROGRESS NOTES
Pt's discharge instructions gone over with pt. Pt expressed understanding of medication changes, and future appointments. Telemetry removed and disinfected and placed back in storage drawer. IV removed with catheter intact. Pt gathered own belongings. Pt escorted off floor with belongings to pt's vehicle, in stable condition.

## 2025-05-22 NOTE — CARE COORDINATION
5/20/25  Transition of Care update.  Patient admitted for Tikosyn loading with EP following.  Patient planned for cardioversion after 4th dose if patient does not convert to normal sinus. Patient is independent from home. Discharge goal is home when medically stable with no discharge needs anticipated. JOSE A/VLADIMIR to follow.     Electronically signed by PINKY Gallego on 5/20/2025 at 9:58 AM   
5/21/25  Transition of Care update.   Patient admitted for Tikosyn loading with EP following.  Patient planned for cardioversion after 4th dose if patient does not convert to normal sinus. Patient is independent from home. Discharge goal is home when medically stable with no discharge needs anticipated. JOSE A/VLADIMIR to follow.     Electronically signed by PINKY Gallego on 5/21/2025 at 7:28 AM   
Patient was due to be discharged home today, EKG this morning showed Afib and QTC of 508; EP following. Patient plans to return home, no needs reported, she is independent in the room and will drive herself home; vehicle in the parking lot.    Electronically signed by COBY Haines on 5/22/2025 at 9:18 AM   
Fax:

## 2025-05-22 NOTE — DISCHARGE SUMMARY
[facility]- [facility]rophysiology  Discharge Summary  Patient: Maricruz Booth  Medical Record Number: [id number]  Date of Procedure:  5/22/2025  Primary Electrophysiologist: Sergio Vyas MD  Referring Physician: Radha Tomlin DO     Admission Date:5/19/2025      Discharge Date:  05/22/2025    Patient Active Problem List   Diagnosis    Vitamin D deficiency    Mixed hyperlipidemia    Atrial fibrillation (HCC)    JESSICA (obstructive sleep apnea)    Tubular adenoma of colon    Acquired hypothyroidism    Diverticulosis    Morbid obesity with BMI of 45.0-49.9, adult (HCC)    Essential hypertension    Prediabetes    Persistent atrial fibrillation (HCC)          Medication List        ASK your doctor about these medications      ammonium lactate 12 % lotion  Commonly known as: LAC-HYDRIN  Apply to feet qd     CO Q 10 PO     dilTIAZem 120 MG extended release capsule  Commonly known as: CARDIZEM CD  Take 1 capsule by mouth daily     Efinaconazole 10 % Soln  Apply 8 mLs topically daily     Eliquis 5 MG Tabs tablet  Generic drug: apixaban  TAKE 1 TABLET TWICE A DAY     rosuvastatin 5 MG tablet  Commonly known as: CRESTOR  TAKE 1 TABLET NIGHTLY     Synthroid 125 MCG tablet  Generic drug: levothyroxine  TAKE 1 TABLET DAILY     vitamin D 50 MCG (2000 UT) Caps capsule  Commonly known as: CHOLECALCIFEROL              Hospital Course: Maricruz Booth is a 74 y.o. female with a past medical history of persistent atrial fibrillation, hypertension, hyperlipidemia, hypothyroidism, obesity (BMI 47.63 kg or meter squared) he is in mild obstructive sleep apnea.  She is symptomatic with atrial fibrillation noting increasing activity intolerance when in A-fib.  She was previously managed with metoprolol however this resulted in fatigue which was changed to Cardizem.  She had a cardioversion on 3/28/2025 with early recurrence of atrial fibrillation and presented to the hospital 5/19/2025 for

## 2025-05-22 NOTE — DISCHARGE INSTRUCTIONS
[facility] electrophysiology discharge instructions:  Due to your persistent atrial fibrillation with early recurrence of atrial fibrillation after cardioversion alone in March of this year (2025) you are admitted for dofetilide (Tikosyn) admission you are placed on 250 mics every 12 hours at 6 AM and 6 PM.  You tolerated this without significant EKG changes and spontaneously converted to sinus rhythm after the second dose of this medication.  Unfortunately you had spontaneous return of atrial fibrillation prior to discharge.  Our plan is to continue this Tikosyn 250 mics every 12 hours at 6 AM and 6 PM (okay to move to different times but only at 1 hour increments for example at 7 AM and 7 PM) our anticipation is that the longer you are on Tikosyn the more effective it will become in maintaining sinus rhythm for you.  You are encouraged to continue to use your CPAP, attempt to lose weight, remain physically active.  You have a follow-up EKG on 05/03/2025 at 1 PM and a follow-up with Alyssia Brennan a nurse practitioner with Dr. Vyas on 07/10/2025 at noon.  Your Tikosyn prescription has been sent to your local pharmacy if you would like to utilize a mail-in pharmacy please contact our office for additional refills.

## 2025-05-23 ENCOUNTER — CARE COORDINATION (OUTPATIENT)
Dept: CARE COORDINATION | Age: 75
End: 2025-05-23

## 2025-05-23 DIAGNOSIS — Z79.899 VISIT FOR MONITORING TIKOSYN THERAPY: Primary | ICD-10-CM

## 2025-05-23 DIAGNOSIS — I48.19 PERSISTENT ATRIAL FIBRILLATION (HCC): ICD-10-CM

## 2025-05-23 DIAGNOSIS — Z51.81 VISIT FOR MONITORING TIKOSYN THERAPY: Primary | ICD-10-CM

## 2025-05-23 PROCEDURE — 1111F DSCHRG MED/CURRENT MED MERGE: CPT | Performed by: FAMILY MEDICINE

## 2025-05-23 NOTE — CARE COORDINATION
Care Transitions Note    Initial Call - Call within 2 business days of discharge: Yes    Patient Current Location:  Home: [address]  [place] [address]    Care Transition Nurse contacted the patient by telephone to perform post hospital discharge assessment, verified name and  as identifiers.  Provided introduction to self, and explanation of the Care Transition Nurse role.    Patient: Maricruz Booth    Patient : 1950   MRN: [id number]    Reason for Admission: admission for Tikosyn initiation   Discharge Date: 25  RURS: Readmission Risk Score: 5      Last Discharge Facility       Date Complaint Diagnosis Description Type Department Provider    25   Admission (Discharged) [facility] Sergio Vyas MD            Was this an external facility discharge? No    Additional needs identified to be addressed with provider   No needs identified             Method of communication with provider: none.    Patients top risk factors for readmission: medical condition-recurrent afib and medication management    Interventions to address risk factors:   Review of patient management of conditions/medications:      Care Summary Note: Spoke with Maricruz for initial care transition call post hospital discharge. She reports that she is feeling \"ok so far\" today. She reports that according to her Apple Watch she remains in afib with a current heart rate of 99. She does continue to have fatigue with activities.   She is taking the Tikosyn as ordered and is scheduled for a repeat EKG in Dr. Vyas's office 25. She declined the need to see her PCP at this time.   Maricruz denies any needs, questions, or concerns at this time.     Care Transition Nurse reviewed discharge instructions, medical action plan, and red flags with patient. The patient was given an opportunity to ask questions; no further questions or concerns at this time.. The patient verbalized understanding.  and demonstrated understanding

## 2025-05-25 LAB
EKG ATRIAL RATE: 60 BPM
EKG P AXIS: 36 DEGREES
EKG P-R INTERVAL: 206 MS
EKG Q-T INTERVAL: 474 MS
EKG QRS DURATION: 88 MS
EKG QTC CALCULATION (BAZETT): 474 MS
EKG R AXIS: 17 DEGREES
EKG T AXIS: 15 DEGREES
EKG VENTRICULAR RATE: 60 BPM

## 2025-05-27 LAB
EKG ATRIAL RATE: 60 BPM
EKG ATRIAL RATE: 71 BPM
EKG ATRIAL RATE: 72 BPM
EKG P AXIS: 36 DEGREES
EKG P AXIS: 48 DEGREES
EKG P-R INTERVAL: 198 MS
EKG P-R INTERVAL: 206 MS
EKG Q-T INTERVAL: 418 MS
EKG Q-T INTERVAL: 440 MS
EKG Q-T INTERVAL: 474 MS
EKG QRS DURATION: 84 MS
EKG QRS DURATION: 88 MS
EKG QRS DURATION: 92 MS
EKG QTC CALCULATION (BAZETT): 474 MS
EKG QTC CALCULATION (BAZETT): 478 MS
EKG QTC CALCULATION (BAZETT): 508 MS
EKG R AXIS: 17 DEGREES
EKG R AXIS: 25 DEGREES
EKG R AXIS: 32 DEGREES
EKG T AXIS: 14 DEGREES
EKG T AXIS: 15 DEGREES
EKG T AXIS: 32 DEGREES
EKG VENTRICULAR RATE: 60 BPM
EKG VENTRICULAR RATE: 71 BPM
EKG VENTRICULAR RATE: 89 BPM

## 2025-05-30 ENCOUNTER — CLINICAL SUPPORT (OUTPATIENT)
Dept: NON INVASIVE DIAGNOSTICS | Age: 75
End: 2025-05-30
Payer: MEDICARE

## 2025-05-30 VITALS
RESPIRATION RATE: 18 BRPM | TEMPERATURE: 97 F | SYSTOLIC BLOOD PRESSURE: 108 MMHG | OXYGEN SATURATION: 97 % | HEART RATE: 68 BPM | DIASTOLIC BLOOD PRESSURE: 74 MMHG

## 2025-05-30 DIAGNOSIS — I48.91 ATRIAL FIBRILLATION, UNSPECIFIED TYPE (HCC): Primary | ICD-10-CM

## 2025-05-30 PROCEDURE — 93000 ELECTROCARDIOGRAM COMPLETE: CPT | Performed by: INTERNAL MEDICINE

## 2025-05-30 NOTE — PROGRESS NOTES
EKG preformed today as per Dr Sergio Vyas, for ekg.     BP:  108 74  left upper arm Sitting  P:  68  SP O2: 97    Pt states He/She feels good    EKG done and pt advised it will be sent to the doctor to review and we will call with any further recommendations.     Electronically signed by HENRY GREENFIELD MA on 5/30/2025 at 1:10 PM

## 2025-06-02 ENCOUNTER — PROCEDURE VISIT (OUTPATIENT)
Dept: PODIATRY | Age: 75
End: 2025-06-02
Payer: MEDICARE

## 2025-06-02 VITALS
TEMPERATURE: 98.2 F | BODY MASS INDEX: 47.55 KG/M2 | DIASTOLIC BLOOD PRESSURE: 84 MMHG | WEIGHT: 277 LBS | SYSTOLIC BLOOD PRESSURE: 138 MMHG

## 2025-06-02 DIAGNOSIS — I73.9 PERIPHERAL VASCULAR DISEASE, UNSPECIFIED: ICD-10-CM

## 2025-06-02 DIAGNOSIS — L60.0 INGROWN NAIL: ICD-10-CM

## 2025-06-02 DIAGNOSIS — B35.1 TINEA UNGUIUM: Primary | ICD-10-CM

## 2025-06-02 DIAGNOSIS — R26.2 DIFFICULTY WALKING: ICD-10-CM

## 2025-06-02 DIAGNOSIS — M79.674 PAIN IN RIGHT TOE(S): ICD-10-CM

## 2025-06-02 DIAGNOSIS — M79.675 PAIN IN LEFT TOE(S): ICD-10-CM

## 2025-06-02 PROCEDURE — 11721 DEBRIDE NAIL 6 OR MORE: CPT | Performed by: PODIATRIST

## 2025-06-02 NOTE — PROGRESS NOTES
Exam: tinea and skin changes;     Neurovascular  Dorsalis pedis: 1+  Posterior tibial: absent  Saphenous nerve sensation: normal  Tibial nerve sensation: normal  Superficial peroneal nerve sensation: normal  Deep peroneal nerve sensation: normal  Sural nerve sensation: normal           Ortho Exam  Q7   []Yes    []No                Q8   [x]Yes    []No                     Q9   []Yes    []No  Assessment:  74 y.o. female with:   Maricruz was seen today for toe pain and nail problem.    Diagnoses and all orders for this visit:    Tinea unguium    Ingrown nail    Pain in left toe(s)    Pain in right toe(s)    Peripheral vascular disease, unspecified    Difficulty walking        Plan: Due to chronic ingrown toenails and painful nails debridement is to prevent infection.1-5  bilateral Debridement of all painful nails was performed with both manual and electrical debridement to prevent infection and/or ulceration.  Patient tolerated the debridement well, without any complaints.  Patient was asymptomatic after debridement   Pt was evaluated and examined. Patient was given personalized discharge instructions. Pt will follow up in 9 weeks or sooner if any problems arise. Diagnosis was discussed with the pt and all of their questions were answered in detail. Proper foot hygiene and care was discussed with the pt. Patient to check feet daily and contact the office with any questions/problems/concerns.  Other comorbidity noted and will be managed by PCP.  Pain waiver discussed with patient and confirmed.   6/2/2025      Electronically signed by Graibel Brown DPM on 6/2/2025 at 7:29 AM  6/2/2025

## 2025-06-03 ENCOUNTER — CARE COORDINATION (OUTPATIENT)
Dept: CARE COORDINATION | Age: 75
End: 2025-06-03

## 2025-06-03 NOTE — CARE COORDINATION
Care Transitions Note    Follow Up Call     Patient Current Location:  [facility]    Care Transition Nurse contacted the patient by telephone. Verified name and  as identifiers.    Additional needs identified to be addressed with provider   Maricruz plans to send a [employer] message to Dr. Vyas regarding being in mostly afib since discharge.            Method of communication with provider: none.    Care Summary Note: Spoke with Maricruz for follow up care transition call. She reports that she is feeling \"ok, fatigued still\".   She reports that her Fit Bit watch can detect afib and does a single lead EKG. According to her Fit Bit and how she feels she has been \"in and out\" of afib since discharge and she has been in sinus rhythm only about 3-4 days since discharge (including the day she had her EKG).   She reports when she is in afib her HR ranges  and goes up to about 120 with activity, when she is in sinus rhythm her HR is in the 70's and goes up to about 80-90 with activity.   She continues to take the Tikosyn 250mcg Q12 hours as instructed.    She plans to send a MyChart to Dr. Vyas regarding the above, declined needing assistance, also provided her with his office number if needed.   Maricruz denies any needs, questions, or concerns at this time.     Plan of care updates since last contact:  Review of patient management of conditions/medications:      Advance Care Planning:   Does patient have an Advance Directive: reviewed during previous call, see note. .    Medication Review:  No changes since last call.     Remote Patient Monitoring:  Offered patient enrollment in the Remote Patient Monitoring (RPM) program for in-home monitoring: Patient is not eligible for RPM program because: patient does not have qualifying diagnosis.    Assessments:  Care Transitions Subsequent and Final Call    Subsequent and Final Calls  Do you have any ongoing symptoms?: Yes  Onset of Patient-reported symptoms: In the past

## 2025-06-05 ENCOUNTER — CARE COORDINATION (OUTPATIENT)
Dept: CARE COORDINATION | Age: 75
End: 2025-06-05

## 2025-06-05 NOTE — CARE COORDINATION
Care Transitions Note    Follow Up Call     Patient Current Location:  Home: [address] [place] [address]    Care Transition Nurse contacted the patient by telephone. Verified name and  as identifiers.    Additional needs identified to be addressed with provider   No needs identified                 Method of communication with provider: none.    Care Summary Note: Spoke with Maricruz for follow up care transition call. She reports feeling \"good\" and stated per her Fit Bit watch she has been in sinus rhythm for 3 days as of today. She does continues to have lightheadedness and dizziness at times with activity. She was able to work in her flower beds yesterday and went grocery shopping today without issues.     She did message Dr. Vyas via [employer] today regarding continuing to go in and out of afib and was able to send a copy of her most recent EKG from her watch, discussed his recommendations below:     Maricruz has considered her option of increasing her Cardizem CD to 120mg BID (adding a morning dose) and has decided she would like to see how she progresses over the next several days before increasing her medication as she is concerned it could worsen her dizziness/lightheadedness. She reports her HR is in the 70's during the day and 50-60's while sleeping. Maricruz will reply to Dr. Vyas's [employer] message after this call to notify him.     Maricruz denies any needs, questions, or concerns at this time.     Plan of care updates since last contact:  Review of patient management of conditions/medications:      Advance Care Planning:   Does patient have an Advance Directive: reviewed during previous call, see note. .    Medication Review:  No changes since last call.     Remote Patient Monitoring:  Offered patient enrollment in the Remote Patient Monitoring (RPM) program for in-home monitoring: Yes, but did not enroll at this time: already monitoring with home equipment.    Assessments:  Care Transitions

## 2025-06-12 ENCOUNTER — CARE COORDINATION (OUTPATIENT)
Dept: CARE COORDINATION | Age: 75
End: 2025-06-12

## 2025-06-12 NOTE — CARE COORDINATION
Care Transitions Note    Follow Up Call     Patient Current Location:  Home: [id number] [place] [address]    Care Transition Nurse contacted the patient by telephone. Verified name and  as identifiers.    Additional needs identified to be addressed with provider   No needs identified                 Method of communication with provider: none.    Care Summary Note: Spoke with Maricruz for follow up care transition call. She reports that she continues to feel fatigued intermittently when she goes into afib, however, she is feeling \"pretty good\" today.   She reports that she made the decision to increase the Cardizem to 120mg BID earlier this week rather than once per day at the recommendation of Dr. Vyas last week. She has maintained SR for 4 days straight now with a few short episodes of afib lasting a couple of hours or less this week.   When she goes into afib she rests, does breathing techniques, hydrates, and applies ice to the neck and face, however, she has not had to do so the last couple of days.   She did submit a repeat EKG via [employer] today to Dr. Vyas.   Maricruz denies any needs, questions, or concerns at this time.     Plan of care updates since last contact:  Review of patient management of conditions/medications:         Advance Care Planning:   Does patient have an Advance Directive: reviewed during previous call, see note. .    Medication Review:  Medications changed since last call, reviewed today.     Remote Patient Monitoring:  Offered patient enrollment in the Remote Patient Monitoring (RPM) program for in-home monitoring: Patient is not eligible for RPM program because: patient does not have qualifying diagnosis.    Assessments:  Care Transitions Subsequent and Final Call    Subsequent and Final Calls  Do you have any ongoing symptoms?: No  Have your medications changed?: Yes  Patient Reports: increased the Cardizem to 120mg BID earlier this week as suggested by

## 2025-06-18 ENCOUNTER — CARE COORDINATION (OUTPATIENT)
Dept: CARE COORDINATION | Age: 75
End: 2025-06-18

## 2025-06-18 NOTE — CARE COORDINATION
Care Transitions Note    Final Call     Patient Current Location:  Home: [id number] [place] [address]    Care Transition Nurse contacted the patient by telephone. Verified name and  as identifiers.    Patient graduated from the Care Transitions program on 25.  Patient/family verbalizes confidence in the ability to self-manage at this time. has the ability to self-manage at this time..      Advance Care Planning:   Does patient have an Advance Directive: reviewed during previous call, see note. .    Handoff:   Patient was not referred to the ACM team due to no additional needs identified.       Care Summary Note: Spoke with Maricruz for final care transition call. She reports that she has been in normal sinus rhythm for about 6 days straight. She continues to take the Cardizem BID. She feels her fatigue is improving as well.   Maricruz denies any needs, questions, or concerns at this time.      Assessments:  Care Transitions Subsequent and Final Call    Subsequent and Final Calls  Do you have any ongoing symptoms?: No  Have your medications changed?: No  Do you have any questions related to your medications?: No  Do you currently have any active services?: No  Do you have any needs or concerns that I can assist you with?: No  Identified Barriers: None  Care Transitions Interventions  No Identified Needs  Other Interventions:              Upcoming Appointments:    Future Appointments         Provider Specialty Dept Phone    7/10/2025 12:00 PM Alyssia Brennan, APRN - CNP Cardiac Electrophysiology [phone]    2025 7:30 AM Radha Tomlin DO Family Medicine [phone]    2025 7:30 AM Grabiel Brown, DPM Podiatry [phone]            Patient has agreed to contact primary care provider and/or specialist for any further questions, concerns, or needs.    Amy Reno RN

## 2025-07-08 DIAGNOSIS — E55.9 VITAMIN D DEFICIENCY: ICD-10-CM

## 2025-07-08 DIAGNOSIS — E03.9 ACQUIRED HYPOTHYROIDISM: ICD-10-CM

## 2025-07-08 DIAGNOSIS — E78.1 PURE HYPERTRIGLYCERIDEMIA: ICD-10-CM

## 2025-07-08 DIAGNOSIS — R73.03 PREDIABETES: ICD-10-CM

## 2025-07-08 DIAGNOSIS — I48.11 LONGSTANDING PERSISTENT ATRIAL FIBRILLATION (HCC): ICD-10-CM

## 2025-07-08 LAB
ALBUMIN: 4.2 G/DL (ref 3.5–5.2)
ALP BLD-CCNC: 78 U/L (ref 35–104)
ALT SERPL-CCNC: 17 U/L (ref 0–35)
ANION GAP SERPL CALCULATED.3IONS-SCNC: 12 MMOL/L (ref 7–16)
AST SERPL-CCNC: 26 U/L (ref 0–35)
BASOPHILS ABSOLUTE: 0.02 K/UL (ref 0–0.2)
BASOPHILS RELATIVE PERCENT: 0 % (ref 0–2)
BILIRUB SERPL-MCNC: 0.4 MG/DL (ref 0–1.2)
BUN BLDV-MCNC: 18 MG/DL (ref 8–23)
CALCIUM SERPL-MCNC: 9.3 MG/DL (ref 8.8–10.2)
CHLORIDE BLD-SCNC: 103 MMOL/L (ref 98–107)
CHOLESTEROL, TOTAL: 144 MG/DL
CO2: 26 MMOL/L (ref 22–29)
CREAT SERPL-MCNC: 0.9 MG/DL (ref 0.5–1)
EOSINOPHILS ABSOLUTE: 0.36 K/UL (ref 0.05–0.5)
EOSINOPHILS RELATIVE PERCENT: 4 % (ref 0–6)
GFR, ESTIMATED: 66 ML/MIN/1.73M2
GLUCOSE BLD-MCNC: 92 MG/DL (ref 74–99)
HBA1C MFR BLD: 5.9 % (ref 4–5.6)
HCT VFR BLD CALC: 44.9 % (ref 34–48)
HDLC SERPL-MCNC: 46 MG/DL
HEMOGLOBIN: 14.1 G/DL (ref 11.5–15.5)
IMMATURE GRANULOCYTES %: 1 % (ref 0–5)
IMMATURE GRANULOCYTES ABSOLUTE: 0.04 K/UL (ref 0–0.58)
LDL CHOLESTEROL: 66 MG/DL
LYMPHOCYTES ABSOLUTE: 1.54 K/UL (ref 1.5–4)
LYMPHOCYTES RELATIVE PERCENT: 18 % (ref 20–42)
MCH RBC QN AUTO: 27.5 PG (ref 26–35)
MCHC RBC AUTO-ENTMCNC: 31.4 G/DL (ref 32–34.5)
MCV RBC AUTO: 87.7 FL (ref 80–99.9)
MONOCYTES ABSOLUTE: 0.67 K/UL (ref 0.1–0.95)
MONOCYTES RELATIVE PERCENT: 8 % (ref 2–12)
NEUTROPHILS ABSOLUTE: 5.85 K/UL (ref 1.8–7.3)
NEUTROPHILS RELATIVE PERCENT: 69 % (ref 43–80)
PDW BLD-RTO: 15.3 % (ref 11.5–15)
PLATELET # BLD: 220 K/UL (ref 130–450)
PMV BLD AUTO: 11.9 FL (ref 7–12)
POTASSIUM SERPL-SCNC: 4.6 MMOL/L (ref 3.5–5.1)
RBC # BLD: 5.12 M/UL (ref 3.5–5.5)
SODIUM BLD-SCNC: 141 MMOL/L (ref 136–145)
TOTAL PROTEIN: 8.4 G/DL (ref 6.4–8.3)
TRIGL SERPL-MCNC: 163 MG/DL
TSH SERPL DL<=0.05 MIU/L-ACNC: 0.41 UIU/ML (ref 0.27–4.2)
VITAMIN D 25-HYDROXY: 41.8 NG/ML (ref 30–100)
VLDLC SERPL CALC-MCNC: 33 MG/DL
WBC # BLD: 8.5 K/UL (ref 4.5–11.5)

## 2025-07-10 ENCOUNTER — OFFICE VISIT (OUTPATIENT)
Dept: NON INVASIVE DIAGNOSTICS | Age: 75
End: 2025-07-10
Payer: MEDICARE

## 2025-07-10 VITALS
SYSTOLIC BLOOD PRESSURE: 128 MMHG | BODY MASS INDEX: 50.35 KG/M2 | HEIGHT: 62 IN | RESPIRATION RATE: 18 BRPM | TEMPERATURE: 97.5 F | HEART RATE: 61 BPM | OXYGEN SATURATION: 97 % | DIASTOLIC BLOOD PRESSURE: 68 MMHG | WEIGHT: 273.6 LBS

## 2025-07-10 DIAGNOSIS — G47.33 OSA (OBSTRUCTIVE SLEEP APNEA): ICD-10-CM

## 2025-07-10 DIAGNOSIS — Z51.81 VISIT FOR MONITORING TIKOSYN THERAPY: ICD-10-CM

## 2025-07-10 DIAGNOSIS — Z79.899 VISIT FOR MONITORING TIKOSYN THERAPY: ICD-10-CM

## 2025-07-10 DIAGNOSIS — I48.91 ATRIAL FIBRILLATION, UNSPECIFIED TYPE (HCC): Primary | ICD-10-CM

## 2025-07-10 DIAGNOSIS — I10 ESSENTIAL HYPERTENSION: ICD-10-CM

## 2025-07-10 DIAGNOSIS — Z79.01 ANTICOAGULATED BY ANTICOAGULATION TREATMENT: ICD-10-CM

## 2025-07-10 PROCEDURE — 1123F ACP DISCUSS/DSCN MKR DOCD: CPT | Performed by: NURSE PRACTITIONER

## 2025-07-10 PROCEDURE — 93000 ELECTROCARDIOGRAM COMPLETE: CPT | Performed by: STUDENT IN AN ORGANIZED HEALTH CARE EDUCATION/TRAINING PROGRAM

## 2025-07-10 PROCEDURE — 1159F MED LIST DOCD IN RCRD: CPT | Performed by: NURSE PRACTITIONER

## 2025-07-10 PROCEDURE — 3078F DIAST BP <80 MM HG: CPT | Performed by: NURSE PRACTITIONER

## 2025-07-10 PROCEDURE — 99215 OFFICE O/P EST HI 40 MIN: CPT | Performed by: NURSE PRACTITIONER

## 2025-07-10 PROCEDURE — 3074F SYST BP LT 130 MM HG: CPT | Performed by: NURSE PRACTITIONER

## 2025-07-10 RX ORDER — DILTIAZEM HYDROCHLORIDE 120 MG/1
120 CAPSULE, COATED, EXTENDED RELEASE ORAL 2 TIMES DAILY
Qty: 180 CAPSULE | Refills: 3 | Status: SHIPPED | OUTPATIENT
Start: 2025-07-10

## 2025-07-11 SDOH — HEALTH STABILITY: PHYSICAL HEALTH: ON AVERAGE, HOW MANY DAYS PER WEEK DO YOU ENGAGE IN MODERATE TO STRENUOUS EXERCISE (LIKE A BRISK WALK)?: 1 DAY

## 2025-07-11 SDOH — HEALTH STABILITY: PHYSICAL HEALTH: ON AVERAGE, HOW MANY MINUTES DO YOU ENGAGE IN EXERCISE AT THIS LEVEL?: 20 MIN

## 2025-07-11 SDOH — ECONOMIC STABILITY: FOOD INSECURITY: WITHIN THE PAST 12 MONTHS, THE FOOD YOU BOUGHT JUST DIDN'T LAST AND YOU DIDN'T HAVE MONEY TO GET MORE.: NEVER TRUE

## 2025-07-11 SDOH — ECONOMIC STABILITY: FOOD INSECURITY: WITHIN THE PAST 12 MONTHS, YOU WORRIED THAT YOUR FOOD WOULD RUN OUT BEFORE YOU GOT MONEY TO BUY MORE.: NEVER TRUE

## 2025-07-11 SDOH — ECONOMIC STABILITY: TRANSPORTATION INSECURITY
IN THE PAST 12 MONTHS, HAS THE LACK OF TRANSPORTATION KEPT YOU FROM MEDICAL APPOINTMENTS OR FROM GETTING MEDICATIONS?: NO

## 2025-07-11 SDOH — ECONOMIC STABILITY: INCOME INSECURITY: IN THE LAST 12 MONTHS, WAS THERE A TIME WHEN YOU WERE NOT ABLE TO PAY THE MORTGAGE OR RENT ON TIME?: NO

## 2025-07-11 ASSESSMENT — LIFESTYLE VARIABLES
HOW MANY STANDARD DRINKS CONTAINING ALCOHOL DO YOU HAVE ON A TYPICAL DAY: PATIENT DOES NOT DRINK
HOW OFTEN DO YOU HAVE A DRINK CONTAINING ALCOHOL: 2
HOW OFTEN DO YOU HAVE SIX OR MORE DRINKS ON ONE OCCASION: 1
HOW MANY STANDARD DRINKS CONTAINING ALCOHOL DO YOU HAVE ON A TYPICAL DAY: 0
HOW OFTEN DO YOU HAVE A DRINK CONTAINING ALCOHOL: MONTHLY OR LESS

## 2025-07-11 ASSESSMENT — PATIENT HEALTH QUESTIONNAIRE - PHQ9
SUM OF ALL RESPONSES TO PHQ QUESTIONS 1-9: 0
SUM OF ALL RESPONSES TO PHQ QUESTIONS 1-9: 0
2. FEELING DOWN, DEPRESSED OR HOPELESS: NOT AT ALL
1. LITTLE INTEREST OR PLEASURE IN DOING THINGS: NOT AT ALL
SUM OF ALL RESPONSES TO PHQ QUESTIONS 1-9: 0
SUM OF ALL RESPONSES TO PHQ QUESTIONS 1-9: 0

## 2025-07-14 ENCOUNTER — OFFICE VISIT (OUTPATIENT)
Dept: FAMILY MEDICINE CLINIC | Age: 75
End: 2025-07-14
Payer: MEDICARE

## 2025-07-14 VITALS
WEIGHT: 273.4 LBS | DIASTOLIC BLOOD PRESSURE: 60 MMHG | HEIGHT: 63 IN | OXYGEN SATURATION: 98 % | RESPIRATION RATE: 20 BRPM | BODY MASS INDEX: 48.44 KG/M2 | HEART RATE: 65 BPM | SYSTOLIC BLOOD PRESSURE: 116 MMHG | TEMPERATURE: 97.9 F

## 2025-07-14 DIAGNOSIS — Z00.00 MEDICARE ANNUAL WELLNESS VISIT, SUBSEQUENT: Primary | ICD-10-CM

## 2025-07-14 DIAGNOSIS — I10 ESSENTIAL HYPERTENSION: ICD-10-CM

## 2025-07-14 DIAGNOSIS — E78.2 MIXED HYPERLIPIDEMIA: ICD-10-CM

## 2025-07-14 DIAGNOSIS — E66.01 MORBID OBESITY WITH BMI OF 45.0-49.9, ADULT (HCC): ICD-10-CM

## 2025-07-14 DIAGNOSIS — I48.21 PERMANENT ATRIAL FIBRILLATION (HCC): ICD-10-CM

## 2025-07-14 DIAGNOSIS — E03.9 ACQUIRED HYPOTHYROIDISM: ICD-10-CM

## 2025-07-14 DIAGNOSIS — R73.03 PREDIABETES: ICD-10-CM

## 2025-07-14 DIAGNOSIS — E55.9 VITAMIN D DEFICIENCY: ICD-10-CM

## 2025-07-14 DIAGNOSIS — H91.91 HEARING LOSS OF RIGHT EAR, UNSPECIFIED HEARING LOSS TYPE: ICD-10-CM

## 2025-07-14 PROBLEM — I48.91 ATRIAL FIBRILLATION (HCC): Status: RESOLVED | Noted: 2019-02-13 | Resolved: 2025-07-14

## 2025-07-14 PROCEDURE — 1159F MED LIST DOCD IN RCRD: CPT | Performed by: FAMILY MEDICINE

## 2025-07-14 PROCEDURE — 1123F ACP DISCUSS/DSCN MKR DOCD: CPT | Performed by: FAMILY MEDICINE

## 2025-07-14 PROCEDURE — 1160F RVW MEDS BY RX/DR IN RCRD: CPT | Performed by: FAMILY MEDICINE

## 2025-07-14 PROCEDURE — 3074F SYST BP LT 130 MM HG: CPT | Performed by: FAMILY MEDICINE

## 2025-07-14 PROCEDURE — 3078F DIAST BP <80 MM HG: CPT | Performed by: FAMILY MEDICINE

## 2025-07-14 PROCEDURE — G0439 PPPS, SUBSEQ VISIT: HCPCS | Performed by: FAMILY MEDICINE

## 2025-07-14 NOTE — PROGRESS NOTES
Medicare Annual Wellness Visit    Maricruz Booth is here for Medicare AWV    Assessment & Plan   Medicare annual wellness visit, subsequent    Essential hypertension  Well-controlled. Continue diltiazem 120 mg BID. Follow-up in 6 months with blood work.  -     CBC with Auto Differential; Future  -     Comprehensive Metabolic Panel; Future  -     Albumin/Creatinine Ratio, Urine; Future    Mixed hyperlipidemia  Stable. Continue rosuvastatin 5 mg HS. Follow-up in 6 months with blood work.         -     Lipid Panel; Future    Acquired hypothyroidism  Well-controlled. Continue Synthroid 125 mcg QD. Follow-up in 6 months with blood work.   -     TSH; Future    Permanent atrial fibrillation (HCC)  Stable. Continue Eliquis 5 mg BID, Tikosyn 250 mg BID, and diltiazem 120 mg BID. Continue follow-up with Electrophysiology every 6 months.  -     Magnesium; Future    Hearing loss of right ear, unspecified hearing loss type  -     Chiqui Gallegos AU.D Audiology, [facility]    Morbid obesity with BMI of 45.0-49.9, adult (HCC)  Patient provided information on healthy diet and starting a weight loss plan in her AVS.     Prediabetes  -     Hemoglobin A1C; Future    Vitamin D deficiency  -     Vitamin D 25 Hydroxy; Future       Return in 6 months for 6 month med check.  Return in 1 year (on 7/14/2026) for Medicare Annual Wellness Visit in 1 year.       Subjective     HTN   - On diltiazem 120 mg BID. Reports compliance with medication. Denies side effects of medication.   - Denies chest pain. Reports improved shortness of breath since she has been in NSR and not atrial fibrillation every day.      HLD   - On rosuvastatin 5 mg HS. Reports compliance with medication. Denies side effects of medication.   The 10-year ASCVD risk score (Shirley RASHID, et al., 2019) is: 12.3%    Values used to calculate the score:      Age: 74 years      Sex: Female      Is Non- : No      Diabetic: No      Tobacco smoker: No

## 2025-07-18 DIAGNOSIS — E78.2 MIXED HYPERLIPIDEMIA: ICD-10-CM

## 2025-07-18 RX ORDER — ROSUVASTATIN CALCIUM 5 MG/1
5 TABLET, COATED ORAL NIGHTLY
Qty: 90 TABLET | Refills: 1 | Status: SHIPPED | OUTPATIENT
Start: 2025-07-18

## 2025-07-18 NOTE — TELEPHONE ENCOUNTER
Name of Medication(s) Requested:  Requested Prescriptions     Pending Prescriptions Disp Refills    rosuvastatin (CRESTOR) 5 MG tablet [Pharmacy Med Name: ROSUVASTATIN TAB 5MG] 90 tablet 1     Sig: TAKE 1 TABLET NIGHTLY       Medication is on current medication list Yes    Dosage and directions were verified? Yes    Quantity verified: 90 day supply     Pharmacy Verified?  Yes    Last Appointment:  7/14/2025    Future appts:  Future Appointments   Date Time Provider Department Center   8/25/2025  7:30 AM Grabiel Brown DPM Col Podiatry [facility]   1/14/2026  7:30 AM [facility]   7/16/2026  7:30 AM [facility]        (If no appt send self scheduling link. .REFILLAPPT)  Scheduling request sent?     [] Yes  [x] No    Does patient need updated?  [] Yes  [x] No

## 2025-07-23 ENCOUNTER — TELEPHONE (OUTPATIENT)
Dept: AUDIOLOGY | Age: 75
End: 2025-07-23

## 2025-07-23 NOTE — TELEPHONE ENCOUNTER
Referral for Hearing Evaluation received.  Called patient and scheduled for 8/11/25  930 am .    Electronically signed by Carolina Krishna on 7/23/25 at 3:45 PM EDT

## 2025-08-11 ENCOUNTER — PROCEDURE VISIT (OUTPATIENT)
Dept: AUDIOLOGY | Age: 75
End: 2025-08-11
Payer: MEDICARE

## 2025-08-11 DIAGNOSIS — H93.13 TINNITUS OF BOTH EARS: ICD-10-CM

## 2025-08-11 DIAGNOSIS — H90.A21 SENSORINEURAL HEARING LOSS (SNHL) OF RIGHT EAR WITH RESTRICTED HEARING OF LEFT EAR: Primary | ICD-10-CM

## 2025-08-11 DIAGNOSIS — R42 VERTIGO: ICD-10-CM

## 2025-08-11 PROCEDURE — 92557 COMPREHENSIVE HEARING TEST: CPT | Performed by: AUDIOLOGIST

## 2025-08-11 PROCEDURE — 92567 TYMPANOMETRY: CPT | Performed by: AUDIOLOGIST

## 2025-08-21 DIAGNOSIS — E03.9 ACQUIRED HYPOTHYROIDISM: ICD-10-CM

## 2025-08-21 RX ORDER — LEVOTHYROXINE SODIUM 125 MCG
125 TABLET ORAL DAILY
Qty: 90 TABLET | Refills: 1 | Status: SHIPPED | OUTPATIENT
Start: 2025-08-21

## 2025-08-25 ENCOUNTER — PROCEDURE VISIT (OUTPATIENT)
Dept: PODIATRY | Age: 75
End: 2025-08-25
Payer: MEDICARE

## 2025-08-25 VITALS
BODY MASS INDEX: 49.14 KG/M2 | SYSTOLIC BLOOD PRESSURE: 129 MMHG | TEMPERATURE: 97.3 F | WEIGHT: 273 LBS | HEART RATE: 69 BPM | DIASTOLIC BLOOD PRESSURE: 81 MMHG

## 2025-08-25 DIAGNOSIS — R26.2 DIFFICULTY WALKING: ICD-10-CM

## 2025-08-25 DIAGNOSIS — I73.9 PERIPHERAL VASCULAR DISEASE, UNSPECIFIED: ICD-10-CM

## 2025-08-25 DIAGNOSIS — L60.0 OC (ONYCHOCRYPTOSIS): ICD-10-CM

## 2025-08-25 DIAGNOSIS — M79.674 PAIN IN RIGHT TOE(S): ICD-10-CM

## 2025-08-25 DIAGNOSIS — B35.1 TINEA UNGUIUM: Primary | ICD-10-CM

## 2025-08-25 DIAGNOSIS — M79.675 PAIN IN LEFT TOE(S): ICD-10-CM

## 2025-08-25 PROCEDURE — 11721 DEBRIDE NAIL 6 OR MORE: CPT | Performed by: PODIATRIST

## (undated) DEVICE — SPONGE GZ W4XL4IN RAYON POLY FILL CVR W/ NONWOVEN FAB

## (undated) DEVICE — GRADUATE TRIANG MEASURE 1000ML BLK PRNT

## (undated) DEVICE — FORCEPS BX L240CM JAW DIA2.4MM ORNG L CAP W/ NDL DISP RAD